# Patient Record
Sex: FEMALE | Race: WHITE | NOT HISPANIC OR LATINO | Employment: PART TIME | URBAN - METROPOLITAN AREA
[De-identification: names, ages, dates, MRNs, and addresses within clinical notes are randomized per-mention and may not be internally consistent; named-entity substitution may affect disease eponyms.]

---

## 2017-01-04 ENCOUNTER — TRANSCRIBE ORDERS (OUTPATIENT)
Dept: LAB | Facility: HOSPITAL | Age: 67
End: 2017-01-04

## 2017-01-04 DIAGNOSIS — I48.20 CHRONIC ATRIAL FIBRILLATION (HCC): Primary | ICD-10-CM

## 2017-04-15 ENCOUNTER — HOSPITAL ENCOUNTER (EMERGENCY)
Facility: HOSPITAL | Age: 67
Discharge: HOME | End: 2017-04-16
Attending: EMERGENCY MEDICINE | Admitting: EMERGENCY MEDICINE
Payer: COMMERCIAL

## 2017-04-15 VITALS
DIASTOLIC BLOOD PRESSURE: 80 MMHG | HEIGHT: 65 IN | TEMPERATURE: 96.5 F | BODY MASS INDEX: 23.32 KG/M2 | SYSTOLIC BLOOD PRESSURE: 130 MMHG | RESPIRATION RATE: 20 BRPM | HEART RATE: 67 BPM | WEIGHT: 140 LBS | OXYGEN SATURATION: 97 %

## 2017-04-15 DIAGNOSIS — T78.40XA ALLERGIC REACTION, INITIAL ENCOUNTER: Primary | ICD-10-CM

## 2017-04-15 DIAGNOSIS — B00.1 COLD SORE: ICD-10-CM

## 2017-04-15 PROCEDURE — 96375 TX/PRO/DX INJ NEW DRUG ADDON: CPT

## 2017-04-15 PROCEDURE — 96374 THER/PROPH/DIAG INJ IV PUSH: CPT

## 2017-04-15 PROCEDURE — 96361 HYDRATE IV INFUSION ADD-ON: CPT

## 2017-04-15 RX ORDER — METHYLPREDNISOLONE SODIUM SUCCINATE 125 MG/2ML
80 INJECTION, POWDER, LYOPHILIZED, FOR SOLUTION INTRAMUSCULAR; INTRAVENOUS ONCE
Status: COMPLETED | OUTPATIENT
Start: 2017-04-15 | End: 2017-04-15

## 2017-04-15 RX ORDER — DIPHENHYDRAMINE HYDROCHLORIDE 50 MG/ML
25 INJECTION INTRAMUSCULAR; INTRAVENOUS ONCE
Status: COMPLETED | OUTPATIENT
Start: 2017-04-15 | End: 2017-04-15

## 2017-04-15 RX ADMIN — SODIUM CHLORIDE 1000 ML: 0.9 INJECTION, SOLUTION INTRAVENOUS at 22:34

## 2017-04-15 RX ADMIN — DIPHENHYDRAMINE HYDROCHLORIDE 25 MG: 50 INJECTION, SOLUTION INTRAMUSCULAR; INTRAVENOUS at 22:54

## 2017-04-15 RX ADMIN — METHYLPREDNISOLONE SODIUM SUCCINATE 80 MG: 125 INJECTION, POWDER, FOR SOLUTION INTRAMUSCULAR; INTRAVENOUS at 22:42

## 2017-04-15 RX ADMIN — FAMOTIDINE 20 MG: 10 INJECTION, SOLUTION INTRAVENOUS at 22:47

## 2017-04-16 PROCEDURE — 99283 EMERGENCY DEPT VISIT LOW MDM: CPT

## 2017-04-16 RX ORDER — PREDNISONE 10 MG/1
20 TABLET ORAL DAILY
Qty: 15 TABLET | Refills: 0 | Status: SHIPPED | OUTPATIENT
Start: 2017-04-16 | End: 2017-04-21

## 2017-04-16 RX ORDER — VALACYCLOVIR HYDROCHLORIDE 1 G/1
1000 TABLET, FILM COATED ORAL 3 TIMES DAILY
Qty: 30 TABLET | Refills: 0 | Status: SHIPPED | OUTPATIENT
Start: 2017-04-16 | End: 2017-04-16

## 2017-04-16 RX ORDER — VALACYCLOVIR HYDROCHLORIDE 1 G/1
2000 TABLET, FILM COATED ORAL 2 TIMES DAILY
Qty: 4 TABLET | Refills: 0 | Status: SHIPPED | OUTPATIENT
Start: 2017-04-16 | End: 2017-07-13

## 2017-05-03 ENCOUNTER — ALLSCRIPTS OFFICE VISIT (OUTPATIENT)
Dept: OTHER | Facility: OTHER | Age: 67
End: 2017-05-03

## 2017-05-04 ENCOUNTER — GENERIC CONVERSION - ENCOUNTER (OUTPATIENT)
Dept: OTHER | Facility: OTHER | Age: 67
End: 2017-05-04

## 2017-05-10 ENCOUNTER — ALLSCRIPTS OFFICE VISIT (OUTPATIENT)
Dept: OTHER | Facility: OTHER | Age: 67
End: 2017-05-10

## 2017-07-13 ENCOUNTER — GENERIC CONVERSION - ENCOUNTER (OUTPATIENT)
Dept: OTHER | Facility: OTHER | Age: 67
End: 2017-07-13

## 2017-07-13 ENCOUNTER — HOSPITAL ENCOUNTER (EMERGENCY)
Facility: HOSPITAL | Age: 67
Discharge: HOME | End: 2017-07-13
Attending: EMERGENCY MEDICINE
Payer: COMMERCIAL

## 2017-07-13 ENCOUNTER — APPOINTMENT (EMERGENCY)
Dept: RADIOLOGY | Facility: HOSPITAL | Age: 67
End: 2017-07-13
Payer: COMMERCIAL

## 2017-07-13 ENCOUNTER — ALLSCRIPTS OFFICE VISIT (OUTPATIENT)
Dept: OTHER | Facility: OTHER | Age: 67
End: 2017-07-13

## 2017-07-13 VITALS
OXYGEN SATURATION: 99 % | HEART RATE: 70 BPM | RESPIRATION RATE: 18 BRPM | SYSTOLIC BLOOD PRESSURE: 148 MMHG | HEIGHT: 65 IN | BODY MASS INDEX: 22.49 KG/M2 | DIASTOLIC BLOOD PRESSURE: 74 MMHG | WEIGHT: 135 LBS | TEMPERATURE: 97.9 F

## 2017-07-13 DIAGNOSIS — Z86.79 HISTORY OF ATRIAL FIBRILLATION: Primary | ICD-10-CM

## 2017-07-13 DIAGNOSIS — Z86.79 HISTORY OF ATRIAL FLUTTER: ICD-10-CM

## 2017-07-13 DIAGNOSIS — R00.2 PALPITATIONS: ICD-10-CM

## 2017-07-13 LAB
ALBUMIN SERPL BCP-MCNC: 3.3 G/DL (ref 3.5–5)
ALP SERPL-CCNC: 74 U/L (ref 46–116)
ALT SERPL W P-5'-P-CCNC: 26 U/L (ref 12–78)
ANION GAP SERPL CALCULATED.3IONS-SCNC: 6 MMOL/L (ref 4–13)
APTT PPP: 26 SECONDS (ref 23–35)
AST SERPL W P-5'-P-CCNC: 16 U/L (ref 5–45)
ATRIAL RATE: 66 BPM
BASOPHILS # BLD AUTO: 0.03 THOUSANDS/ΜL (ref 0–0.1)
BASOPHILS NFR BLD AUTO: 0 % (ref 0–1)
BILIRUB SERPL-MCNC: 0.35 MG/DL (ref 0.2–1)
BUN SERPL-MCNC: 14 MG/DL (ref 5–25)
CALCIUM SERPL-MCNC: 8.7 MG/DL (ref 8.3–10.1)
CHLORIDE SERPL-SCNC: 108 MMOL/L (ref 100–108)
CO2 SERPL-SCNC: 23 MMOL/L (ref 21–32)
CREAT SERPL-MCNC: 0.73 MG/DL (ref 0.6–1.3)
EOSINOPHIL # BLD AUTO: 0.08 THOUSAND/ΜL (ref 0–0.61)
EOSINOPHIL NFR BLD AUTO: 1 % (ref 0–6)
ERYTHROCYTE [DISTWIDTH] IN BLOOD BY AUTOMATED COUNT: 14.9 % (ref 11.6–15.1)
GFR SERPL CREATININE-BSD FRML MDRD: >60 ML/MIN/1.73SQ M
GLUCOSE SERPL-MCNC: 92 MG/DL (ref 65–140)
HCT VFR BLD AUTO: 36.1 % (ref 34.8–46.1)
HGB BLD-MCNC: 12.3 G/DL (ref 11.5–15.4)
INR PPP: 0.97 (ref 0.86–1.16)
LYMPHOCYTES # BLD AUTO: 1.34 THOUSANDS/ΜL (ref 0.6–4.47)
LYMPHOCYTES NFR BLD AUTO: 17 % (ref 14–44)
MAGNESIUM SERPL-MCNC: 1.9 MG/DL (ref 1.6–2.6)
MCH RBC QN AUTO: 29.7 PG (ref 26.8–34.3)
MCHC RBC AUTO-ENTMCNC: 34.1 G/DL (ref 31.4–37.4)
MCV RBC AUTO: 87 FL (ref 82–98)
MONOCYTES # BLD AUTO: 1.2 THOUSAND/ΜL (ref 0.17–1.22)
MONOCYTES NFR BLD AUTO: 15 % (ref 4–12)
NEUTROPHILS # BLD AUTO: 5.41 THOUSANDS/ΜL (ref 1.85–7.62)
NEUTS SEG NFR BLD AUTO: 67 % (ref 43–75)
NRBC BLD AUTO-RTO: 0 /100 WBCS
P AXIS: 69 DEGREES
PHOSPHATE SERPL-MCNC: 2.5 MG/DL (ref 2.3–4.1)
PLATELET # BLD AUTO: 337 THOUSANDS/UL (ref 149–390)
PMV BLD AUTO: 9.3 FL (ref 8.9–12.7)
POTASSIUM SERPL-SCNC: 3.4 MMOL/L (ref 3.5–5.3)
PR INTERVAL: 282 MS
PROT SERPL-MCNC: 6.6 G/DL (ref 6.4–8.2)
PROTHROMBIN TIME: 12.9 SECONDS (ref 12.1–14.4)
QRS AXIS: 71 DEGREES
QRSD INTERVAL: 82 MS
QT INTERVAL: 408 MS
QTC INTERVAL: 427 MS
RBC # BLD AUTO: 4.14 MILLION/UL (ref 3.81–5.12)
SODIUM SERPL-SCNC: 137 MMOL/L (ref 136–145)
SPECIMEN SOURCE: NORMAL
T WAVE AXIS: 51 DEGREES
TROPONIN I BLD-MCNC: 0 NG/ML (ref 0–0.08)
TROPONIN I SERPL-MCNC: <0.02 NG/ML
VENTRICULAR RATE: 66 BPM
WBC # BLD AUTO: 8.07 THOUSAND/UL (ref 4.31–10.16)

## 2017-07-13 PROCEDURE — 99285 EMERGENCY DEPT VISIT HI MDM: CPT

## 2017-07-13 PROCEDURE — 84100 ASSAY OF PHOSPHORUS: CPT | Performed by: EMERGENCY MEDICINE

## 2017-07-13 PROCEDURE — 36415 COLL VENOUS BLD VENIPUNCTURE: CPT

## 2017-07-13 PROCEDURE — 93005 ELECTROCARDIOGRAM TRACING: CPT | Performed by: EMERGENCY MEDICINE

## 2017-07-13 PROCEDURE — 85730 THROMBOPLASTIN TIME PARTIAL: CPT | Performed by: EMERGENCY MEDICINE

## 2017-07-13 PROCEDURE — 85025 COMPLETE CBC W/AUTO DIFF WBC: CPT | Performed by: EMERGENCY MEDICINE

## 2017-07-13 PROCEDURE — 84484 ASSAY OF TROPONIN QUANT: CPT

## 2017-07-13 PROCEDURE — 85610 PROTHROMBIN TIME: CPT | Performed by: EMERGENCY MEDICINE

## 2017-07-13 PROCEDURE — 80053 COMPREHEN METABOLIC PANEL: CPT | Performed by: EMERGENCY MEDICINE

## 2017-07-13 PROCEDURE — 84484 ASSAY OF TROPONIN QUANT: CPT | Performed by: EMERGENCY MEDICINE

## 2017-07-13 PROCEDURE — 83735 ASSAY OF MAGNESIUM: CPT | Performed by: EMERGENCY MEDICINE

## 2017-07-13 PROCEDURE — 36415 COLL VENOUS BLD VENIPUNCTURE: CPT | Performed by: EMERGENCY MEDICINE

## 2017-07-13 PROCEDURE — 71020 HB CHEST X-RAY 2VW FRONTAL&LATL: CPT

## 2017-07-13 RX ORDER — POTASSIUM CHLORIDE 20 MEQ/1
40 TABLET, EXTENDED RELEASE ORAL ONCE
Status: COMPLETED | OUTPATIENT
Start: 2017-07-13 | End: 2017-07-13

## 2017-07-13 RX ORDER — FLUOXETINE HYDROCHLORIDE 40 MG/1
40 CAPSULE ORAL DAILY
COMMUNITY

## 2017-07-13 RX ORDER — POTASSIUM CHLORIDE 20 MEQ/1
20 TABLET, EXTENDED RELEASE ORAL ONCE
Status: DISCONTINUED | OUTPATIENT
Start: 2017-07-13 | End: 2017-07-13

## 2017-07-13 RX ORDER — POTASSIUM CHLORIDE 20 MEQ/1
20 TABLET, EXTENDED RELEASE ORAL DAILY
COMMUNITY
End: 2020-06-23 | Stop reason: SDUPTHER

## 2017-07-13 RX ORDER — PROPAFENONE HYDROCHLORIDE 150 MG/1
150 TABLET, FILM COATED ORAL DAILY PRN
COMMUNITY
End: 2018-07-12

## 2017-07-13 RX ADMIN — POTASSIUM CHLORIDE 40 MEQ: 1500 TABLET, EXTENDED RELEASE ORAL at 17:23

## 2017-08-21 ENCOUNTER — ALLSCRIPTS OFFICE VISIT (OUTPATIENT)
Dept: OTHER | Facility: OTHER | Age: 67
End: 2017-08-21

## 2017-08-21 DIAGNOSIS — I48.91 ATRIAL FIBRILLATION (HCC): ICD-10-CM

## 2017-08-21 DIAGNOSIS — D68.0 VON WILLEBRAND'S DISEASE (HCC): ICD-10-CM

## 2017-08-30 ENCOUNTER — ALLSCRIPTS OFFICE VISIT (OUTPATIENT)
Dept: OTHER | Facility: OTHER | Age: 67
End: 2017-08-30

## 2017-08-30 ENCOUNTER — APPOINTMENT (OUTPATIENT)
Dept: LAB | Facility: HOSPITAL | Age: 67
End: 2017-08-30
Attending: INTERNAL MEDICINE
Payer: COMMERCIAL

## 2017-08-30 ENCOUNTER — TRANSCRIBE ORDERS (OUTPATIENT)
Dept: LAB | Facility: HOSPITAL | Age: 67
End: 2017-08-30

## 2017-08-30 DIAGNOSIS — D68.0 VON WILLEBRAND'S DISEASE (HCC): ICD-10-CM

## 2017-08-30 LAB
APTT PPP: 27 SECONDS (ref 23–35)
BASOPHILS # BLD AUTO: 0.06 THOUSANDS/ΜL (ref 0–0.1)
BASOPHILS NFR BLD AUTO: 1 % (ref 0–1)
EOSINOPHIL # BLD AUTO: 0.21 THOUSAND/ΜL (ref 0–0.61)
EOSINOPHIL NFR BLD AUTO: 3 % (ref 0–6)
ERYTHROCYTE [DISTWIDTH] IN BLOOD BY AUTOMATED COUNT: 15.4 % (ref 11.6–15.1)
HCT VFR BLD AUTO: 33.9 % (ref 34.8–46.1)
HGB BLD-MCNC: 10.6 G/DL (ref 11.5–15.4)
INR PPP: 0.92 (ref 0.86–1.16)
LYMPHOCYTES # BLD AUTO: 1.45 THOUSANDS/ΜL (ref 0.6–4.47)
LYMPHOCYTES NFR BLD AUTO: 20 % (ref 14–44)
MCH RBC QN AUTO: 26 PG (ref 26.8–34.3)
MCHC RBC AUTO-ENTMCNC: 31.3 G/DL (ref 31.4–37.4)
MCV RBC AUTO: 83 FL (ref 82–98)
MONOCYTES # BLD AUTO: 0.8 THOUSAND/ΜL (ref 0.17–1.22)
MONOCYTES NFR BLD AUTO: 11 % (ref 4–12)
NEUTROPHILS # BLD AUTO: 4.74 THOUSANDS/ΜL (ref 1.85–7.62)
NEUTS SEG NFR BLD AUTO: 65 % (ref 43–75)
NRBC BLD AUTO-RTO: 0 /100 WBCS
PLATELET # BLD AUTO: 435 THOUSANDS/UL (ref 149–390)
PMV BLD AUTO: 9.4 FL (ref 8.9–12.7)
PROTHROMBIN TIME: 12.4 SECONDS (ref 12.1–14.4)
RBC # BLD AUTO: 4.07 MILLION/UL (ref 3.81–5.12)
WBC # BLD AUTO: 7.27 THOUSAND/UL (ref 4.31–10.16)

## 2017-08-30 PROCEDURE — 85025 COMPLETE CBC W/AUTO DIFF WBC: CPT

## 2017-08-30 PROCEDURE — 85730 THROMBOPLASTIN TIME PARTIAL: CPT

## 2017-08-30 PROCEDURE — 85245 CLOT FACTOR VIII VW RISTOCTN: CPT

## 2017-08-30 PROCEDURE — 85246 CLOT FACTOR VIII VW ANTIGEN: CPT

## 2017-08-30 PROCEDURE — 85247 CLOT FACTOR VIII MULTIMETRIC: CPT

## 2017-08-30 PROCEDURE — 85240 CLOT FACTOR VIII AHG 1 STAGE: CPT

## 2017-08-30 PROCEDURE — 85610 PROTHROMBIN TIME: CPT

## 2017-08-30 PROCEDURE — 36415 COLL VENOUS BLD VENIPUNCTURE: CPT

## 2017-09-01 LAB
FACT XIIIA PPP-ACNC: 184 % (ref 57–163)
VWF AG ACT/NOR PPP IA: 203 % (ref 50–200)
VWF:RCO ACT/NOR PPP PL AGG: 48 % (ref 50–200)

## 2017-09-07 LAB — VWF MULTIMERS PPP IB: NORMAL

## 2017-09-14 ENCOUNTER — HOSPITAL ENCOUNTER (OUTPATIENT)
Dept: INFUSION CENTER | Facility: CLINIC | Age: 67
Discharge: HOME | End: 2017-09-14
Payer: COMMERCIAL

## 2017-09-14 VITALS
RESPIRATION RATE: 18 BRPM | BODY MASS INDEX: 23.11 KG/M2 | HEART RATE: 64 BPM | SYSTOLIC BLOOD PRESSURE: 142 MMHG | DIASTOLIC BLOOD PRESSURE: 84 MMHG | WEIGHT: 138.89 LBS | TEMPERATURE: 97.2 F

## 2017-09-14 PROCEDURE — 85246 CLOT FACTOR VIII VW ANTIGEN: CPT | Performed by: INTERNAL MEDICINE

## 2017-09-14 PROCEDURE — 96365 THER/PROPH/DIAG IV INF INIT: CPT

## 2017-09-14 PROCEDURE — 85245 CLOT FACTOR VIII VW RISTOCTN: CPT | Performed by: INTERNAL MEDICINE

## 2017-09-14 PROCEDURE — 85240 CLOT FACTOR VIII AHG 1 STAGE: CPT | Performed by: INTERNAL MEDICINE

## 2017-09-14 RX ORDER — SODIUM CHLORIDE 9 MG/ML
20 INJECTION, SOLUTION INTRAVENOUS CONTINUOUS
Status: DISCONTINUED | OUTPATIENT
Start: 2017-09-14 | End: 2017-09-17 | Stop reason: HOSPADM

## 2017-09-14 RX ORDER — FLECAINIDE ACETATE 100 MG/1
100 TABLET ORAL 2 TIMES DAILY
COMMUNITY
End: 2018-03-01 | Stop reason: SDUPTHER

## 2017-09-14 RX ADMIN — DESMOPRESSIN ACETATE 20 MCG: 4 SOLUTION INTRAVENOUS at 09:50

## 2017-09-14 RX ADMIN — SODIUM CHLORIDE 20 ML/HR: 0.9 INJECTION, SOLUTION INTRAVENOUS at 09:40

## 2017-09-14 NOTE — PROGRESS NOTES
Pt arrived to infusion center for DDAVP, no complaints noted  Von Willebrand Risocetin cofactor and Von Willebrand activity drawn prior to start of DDAVP  Pt tolerated treatment without side effects  Repeat labs drawn post one hour after DDAVP as ordered  Pt discharged home from Infusion center with

## 2017-09-15 LAB
FACT XIIIA PPP-ACNC: 215 % (ref 57–163)
FACT XIIIA PPP-ACNC: 291 % (ref 57–163)
VWF:RCO ACT/NOR PPP PL AGG: 44 % (ref 50–200)

## 2017-09-16 LAB — VWF:RCO ACT/NOR PPP PL AGG: 46 % (ref 50–200)

## 2017-09-20 LAB — FACT VIII AG ACT/NOR PPP IA: 202 %

## 2017-09-23 LAB — FACT VIII AG ACT/NOR PPP IA: 418 %

## 2017-09-28 ENCOUNTER — GENERIC CONVERSION - ENCOUNTER (OUTPATIENT)
Dept: OTHER | Facility: OTHER | Age: 67
End: 2017-09-28

## 2017-10-25 NOTE — PROGRESS NOTES
Assessment  1  Von Willebrand disease (286 4) (D68 0)   2  Atrial fibrillation, new onset (427 31) (I48 91)    Plan  Von Willebrand disease    · (1) APTT; Status:Active; Requested for:72Zzb4986;    Perform:[facility]; Due:91Ugs6735; Ordered; For:Von Willebrand disease; Ordered By:[employer]);   · (1) CBC/PLT/DIFF; Status:Active; Requested for:52Cjj5973;    Perform:[facility]; Due:31Hiv2524; Ordered; For:Von Willebrand disease; Ordered By:[employer]);   · (1) PT WITH INR; Status:Active; Requested for:74Mgy6609;    Perform:[facility]; Due:94Hjr2150; Ordered; For:Von Willebrand disease; Ordered By:[employer]);   · (1) RISTO/VWF FACTOR; Status:Active; Requested for:57Fai7148;    Perform:[facility]; Due:85Ihn4913; Ordered; For:Von Willebrand disease; Ordered By:[employer]);   · (1) VONWILLEBRAND FACTOR MULTIMER PROFILE; Status:Active; Requested  for:89Fii3064;    Perform:[facility]; Due:20Dny6750; Ordered; For:Von Willebrand disease; Ordered By:[employer]); · Follow-up visit in 1 month Evaluation and Treatment  Follow-up  Status: Complete  Done:  67CPD5457 10:15AM   Ordered; For: Ettie Mourning disease; Ordered By: Elsie Soares) Performed:  Due: 14ZZL9366; Last Updated By: Ronnie Kelly; 8/30/2017 10:08:37 AM    Discussion/Summary  Discussion Summary:   Type II a von Willebrand disease, inherited, previous blood work showed Ettie Mourning activity in the range of 27%, Normal factor VIII level, Previous von Willebrand multimeric showed a normal pattern and distribution of the bands which is not consistent with von Willebrand disease type IIAfactor of 69% also not consistent with V WD type II apatient has intermittent atrial fibrillation and the need for anticoagulationI will repeat von Willebrand profile in our lab including ristocetin cofactor, von Willebrand activity, von Willebrand multimer's the patient might have 1 brand type I diseaseI will arrange for DDAVP 20 unit IV over 15 minutes and repeat ristocetin cofactor activity as well as 1 brand activity 30-60 minutes after finishing the infusion to assess the response to DDAVPUsually the patient has VOW type II a the response to DDAVP is not uniformNo aspirin or anticoagulation until I see the patient back in 1 month     Chief Complaint  Chief Complaint: Chief Complaint:   The patient presents to the office today with Atrial fibrillation, For possible anticoagulation or aspirin she is known to have von Willebrand disease type IIA  History of Present Illness  HPI: 74year old  female who was admitted to the hospital in August 2012 for small bowel obstruction, she was diagnosed previously with von Willebrand disease type CCLXXX after she reported heavy menstrual cycles and easy bruisability, evaluated at [facility] and diagnosed with type to a and she was told to receive Humate-P prior to surgical procedures, she had a history of hysterectomy, put it back to me, hemorrhoidectomy without any complication of bleeding  to have colonoscopy with Humate-P without any evidence of [id number] the patient was found to have intermittent atrial fibrillation followed up by cardiology, the patient is here for discussion of possible anticoagulation or aspirin in the picture of von Willebrand disease   Free Text HPI:          Review of SystemsComplete-Female:   Constitutional: no fever,-- not feeling poorly-- and-- not feeling tired  Eyes: No complaints of eye pain, no red eyes, no eyesight problems, no discharge, no dry eyes, no itching of eyes  ENT: no complaints of earache, no loss of hearing, no nose bleeds, no nasal discharge, no sore throat, no hoarseness  Cardiovascular: No complaints of slow heart rate, no fast heart rate, no chest pain, no palpitations, no leg claudication, no lower extremity edema     Respiratory: No complaints of shortness of breath, no wheezing, no cough, no SOB on exertion, no orthopnea, no PND  Gastrointestinal: No complaints of abdominal pain, no constipation, no nausea or vomiting, no diarrhea, no bloody stools  Genitourinary: No complaints of dysuria, no incontinence, no pelvic pain, no dysmenorrhea, no vaginal discharge or bleeding  Musculoskeletal: No complaints of arthralgias, no myalgias, no joint swelling or stiffness, no limb pain or swelling  Integumentary: No complaints of skin rash or lesions, no itching, no skin wounds, no breast pain or lump  Neurological: No complaints of headache, no confusion, no convulsions, no numbness, no dizziness or fainting, no tingling, no limb weakness, no difficulty walking  Psychiatric: Not suicidal, no sleep disturbance, no anxiety or depression, no change in personality, no emotional problems  Endocrine: No complaints of proptosis, no hot flashes, no muscle weakness, no deepening of the voice, no feelings of weakness  Hematologic/Lymphatic: a tendency for easy bruising, but-- no swollen glands-- and-- no swollen glands in the neck  Active Problems  1  Anxiety (300 00) (F41 9)   2  Asthma (493 90) (J45 909)   3  Atrial fibrillation, new onset (427 31) (I48 91)   4  Atrial flutter (427 32) (I48 92)   5  Bronchitis (490) (J40)   6  History of strep sore throat (V12 09) (Z87 09)   7  Von Willebrand disease (286 4) (D68 0)    Surgical History  1  History of Hemorrhoidectomy   2  History of Hysterectomy   3  History of Tonsillectomy    Family History  Family History    1  Family history of Arthritis (716 90) (M19 90)   2  Family history of Diabetes (250 00) (E11 9)   3  Family history of Heart attack (410 90) (I21 3)   4  Family history of Heart disease (429 9) (I51 9)   5  Family history of HTN (hypertension) (401 9) (I10)   6  Family history of Hyperlipidemia (272 4) (E78 5)   7   Family history of Thyroid cancer (193) (C73)    Social History   · Alcohol use (V49 89) (Z78 9)   · Former smoker (S19 45) (U17 011)   · Full-time employment   ·    · Never A Smoker   · No drug use    Current Meds   1  Ativan 0 5 MG Oral Tablet; TAKE 1 TABLET Bedtime; Therapy: (Recorded:03May2017) to Recorded   2  DilTIAZem HCl ER Coated Beads 120 MG Oral Capsule Extended Release 24 Hour;   take 1 capsule daily; Therapy: 90EZO0673 to (Evaluate:28Apr2018)  Requested for: 36LIV3382; Last   Rx:03May2017 Ordered   3  Estradiol 1 MG Oral Tablet; Take 1 tablet daily Recorded   4  Flecainide Acetate 100 MG Oral Tablet; Take 1/2 tablet twice a day for 4 days, then   increase to 1 tablet twice daily; Therapy: 21Aug2017 to (Evaluate:17Feb2018)  Requested for: 21Aug2017; Last   Rx:21Aug2017 Ordered   5  Potassium Chloride ER 20 MEQ Oral Tablet Extended Release; Take 1 tablet daily; Therapy: (Recorded:10May2017) to Recorded   6  Probiotic CAPS; Therapy: (Recorded:10May2017) to Recorded   7  Proventil  (90 Base) MCG/ACT Inhalation Aerosol Solution; Therapy: (Recorded:09May2013) to Recorded   8  PROzac 20 MG Oral Capsule; take 1 capsule daily; Therapy: (Recorded:03May2017) to Recorded   9  Qvar AERS; Therapy: (Recorded:03May2017) to Recorded   10  Vitamin D3 TABS; Therapy: (Recorded:21Oct2016) to Recorded    Allergies  1  Bactrim TABS   2  Wellbutrin TABS    Vitals  Vital Signs    Recorded: 30Aug2017 09:58AM   Temperature 97 7 F   Heart Rate 70   Respiration 16   Systolic 058   Diastolic 80   Height 5 ft 5 in   Weight 137 lb 4 0 oz   BMI Calculated 22 84   BSA Calculated 1 69   O2 Saturation 99     Physical Exam    Constitutional   General appearance: No acute distress, well appearing and well nourished  Eyes   Conjunctiva and lids: No swelling, erythema or discharge  Pupils and irises: Equal, round and reactive to light  Ears, Nose, Mouth, and Throat   External inspection of ears and nose: Normal     Oropharynx: Normal with no erythema, edema, exudate or lesions  Pulmonary   Auscultation of lungs: Clear to auscultation  Cardiovascular   Auscultation of heart: Normal rate and rhythm, normal S1 and S2, without murmurs  Examination of extremities for edema and/or varicosities: Normal     Carotid pulses: Normal     Abdomen   Abdomen: Non-tender, no masses  Liver and spleen: No hepatomegaly or splenomegaly  Lymphatic   Palpation of lymph nodes in neck: No lymphadenopathy  Musculoskeletal   Gait and station: Normal     Digits and nails: Normal without clubbing or cyanosis  Inspection/palpation of joints, bones, and muscles: Normal     Skin   Skin and subcutaneous tissue: Normal without rashes or lesions  Neurologic   Cranial nerves: Cranial nerves 2-12 intact  Reflexes: 2+ and symmetric  Sensation: No sensory loss  Psychiatric   Orientation to person, place, and time: Normal     Mood and affect: Normal         ECOG 0       Future Appointments    Date/Time Provider Specialty Site   09/28/2017 10:15 AM FRANCESCA Lovett  Hematology [facility]   11/02/2017 02:20 PM Ruby Boas, MD Cardiology [facility]     Signatures   Electronically signed by :  FRANCESCA Marinelli ; Aug 30 2017 11:50AM EST                       (Author)

## 2017-11-02 ENCOUNTER — ALLSCRIPTS OFFICE VISIT (OUTPATIENT)
Dept: OTHER | Facility: OTHER | Age: 67
End: 2017-11-02

## 2017-11-02 DIAGNOSIS — I48.91 ATRIAL FIBRILLATION (HCC): ICD-10-CM

## 2017-11-03 NOTE — PROGRESS NOTES
Assessment  Assessed    1  Atrial fibrillation, new onset (427 31) (I48 91)   2  Atrial flutter (427 32) (I48 92)    Plan  Atrial fibrillation, new onset    · (1) POTASSIUM; Status:Active; Requested TOE:04BVT1220;    Perform:[facility]; [phone]; Ordered; For:Atrial fibrillation, new onset; Ordered By:Aura Montoya;   · STRESS TEST ONLY, EXERCISE; Status:Hold For - Scheduling; Requested  BDY:84VKZ2216;    Perform:[facility]; PCE:02MBJ0578; Ordered; For:Atrial fibrillation, new onset; Ordered By:Aura Montoya;   · Follow-up visit in 6 months Evaluation and Treatment  Follow-up  Status: Hold For -  Scheduling  Requested for: 90RHT1249   Ordered; For: Atrial fibrillation, new onset; Ordered By: Trever Gonsalez Performed:  Due: 68IZD8133    Discussion/Summary  Cardiology Discussion Summary Free Text Note Form [facility]:   1  Paroxysmal atrial fibrillation/aflutter  30 day TTM 11/16 showed no recurrence of afib, but she had more issues with afib/flutter over summer 2017  She may be a candidate for Watchman device (ALBA closure device)in the future if Nicky Cox is a contraindication to long term anticoagulation  She did see Dr Morey Shone who did not recommend anticoagulation and aspirin only twice a week due to her bleeding from hemorrhoids and anemia with Hgb 10 6  CHADS VASC is 2 given age and female gender, CHADS score is 0  She did not tolerate pill in the pocket Propafenone due to side effects, started Flecainide daily in 8/17 to help prevent recurrences, which she is tolerating and which has been helping to reduce afib/flutter events  She is also on Diltiazem 120 mg daily  Will get stress test to ensure no significant inducible ischemia and recheck potassium  Chief Complaint  Chief Complaint Free Text Note Form: 3 mth f/u      History of Present Illness  Cardiology HPI Free Text Note Form [facility]: 74year old woman with a history of [address] disease who is here for follow up of paroxysmal atrial fibrillation  was admitted to [place] [address] with bronchitis in 10/16  At that time she was noted to have paroxysmal afib, which she was not symptomatically aware of  She was started on Diltiazem for rate control  Beta blocker was avoided due to her bronchospasm with bronchitis/asthma  She was discharged on ASA 80 after discussions with Hematology due to her increased bleeding risk with von Willebrand's, and the plan was to consider event monitor after she recovered from bronchitis to see if she continued to have episodes of afib, as Hematology felt she should be taken off of aspirin if able in the future  10/16 showed normal LV size and function, EF 60%, no RWMA, grade II diastolic dysfunction, normal RV size and function  Atrial septum bows from left to right consistent with increased left atrial pressure  Trace MR  Trace TR    day TTM in 11-12/16 showed no afib, only sinus rhythm and sinus tachycardia with HR   was having bruising with the aspirin, it was stopped in 12/16 January 2017, she was diagnosed with colitis in the sigmoid colon after she had colonoscopy with Dr Alexsandra Mullericks  was diagnosed with strep throat and scarlet fever 5/4/17, at that time she was noted to be in afib with RVR,  bpm  She reports she had sudden onset palpitations, she felt her pulse, and it was irregular (no chest pain or shortness of breath, she felt a little foggy), she went to Urgent Care, was given Metoprolol 2 5 mg IV, and HR improved to 77 bpm  Her potassium was low  She saw Dr Sheryle Poche the following day, and was started on KCL 20 meq bid  saw her urgently 7/13/17, when she woke up at 2:30 AM with indigestion and felt that her heartbeat was fast and irregular  She took her Cardizem at 2:30, and at 3:30 she took the Propafenone (4 pills), as well as a potassium and aspirin  She was still feeling she was in afib, but HR decreased to 60s   She went to Urgent Care, and had an EKG which showed aflutter, she was going to get IV metoprolol but wasn't given because her BP was too low at 108/72  EKG in office showed rate controlled aflutter, I sent her to ED, but by the time she was seen and had EKG she had converted back to SR  She believes the whole episode was about 12 hours long  7/17 - 8/17, she had three episodes of palpitations, she took Propafenone for two of the episodes, these two episodes lasted about an hour  She doesn't like taking the Propafenone, as it makes her have sided effects - causes dizziness, fatigue, and taste changes  switched her from Propafenone PRN to daily Flecainide in 8/17  She reports she only had one episode of palpitations between 8/17 - 11/17, feels that Flecainide has been helping  She did see Dr Dre Singh, who did not recommend oral anticoagulation, and aspirin only twice a week, on daily aspirin she was noting bleeding with hemorrhoids  Review of Systems  Cardiology Female ROS:     Cardiac: rhythm problems-- and-- palpitations present , but-- no chest pain,-- no fainting/blackouts,-- no heart murmur present,-- no signs of swelling,-- no syncope/fainting,-- no AM fatigue-- and-- no witnessed apnea episodes  Skin: No complaints of nonhealing sores or skin rash  Genitourinary: post menopausal, but-- no recurrent urinary tract infections,-- no frequent urination at night,-- no difficult urination,-- no blood in urine,-- no kidney stones,-- no loss of bladder control,-- no kidney problems,-- no birth control or hormone replacement therapy-- and-- not pregnant   Psychological: depression-- and-- anxiety, but-- no panic attacks,-- no difficulty concentrating-- and-- no palpitations present  General: trouble sleeping-- and-- lack of energy/fatigue, but-- no appetite changes,-- no changes in weight,-- no fever,-- no night sweats-- and-- no frequent infections  Respiratory: no shortness of breath,-- no cough/sputum,-- no wheezing,-- no phlegm-- and-- no hemoptysis     HEENT: no serious eye problems,-- no hearing problems,-- no nose problems,-- no throat problems-- and-- no snoring   Gastrointestinal: diarrhea-- and-- abdonimal pain, but-- no liver problems,-- no nausea,-- no vomiting,-- no heartburn,-- no bloody stools,-- no constipation-- and-- no rectal bleeding   Hematologic: excessive bruising, but-- no bleeding disorders,-- no anemia-- and-- no blood clots-- hemorrhoids bleed a little  Neurological: headaches, but-- no numbness,-- no tingling,-- no weakness,-- no seizures,-- no dizziness,-- no diplopia-- and-- no daytime sleepiness   Musculoskeletal: No complaints of arthritis, back pain, or painfull swelling  ROS Reviewed:   ROS reviewed  Active Problems  Problems    1  Anxiety (300 00) (F41 9)   2  Asthma (493 90) (J45 909)   3  Atrial fibrillation, new onset (427 31) (I48 91)   4  Atrial flutter (427 32) (I48 92)   5  Bronchitis (490) (J40)   6  History of strep sore throat (V12 09) (Z87 09)   7  Von Willebrand disease (286 4) (D68 0)    Past Medical History  Active Problems And Past Medical History Reviewed: The active problems and past medical history were reviewed and updated today  Surgical History  Problems    1  History of Hemorrhoidectomy   2  History of Hysterectomy   3  History of Tonsillectomy  Surgical History Reviewed: The surgical history was reviewed and updated today  Family History  Family History    1  Family history of Arthritis (716 90) (M19 90)   2  Family history of Diabetes (250 00) (E11 9)   3  Family history of Heart attack (410 90) (I21 9)   4  Family history of Heart disease (429 9) (I51 9)   5  Family history of HTN (hypertension) (401 9) (I10)   6  Family history of Hyperlipidemia (272 4) (E78 5)   7  Family history of Thyroid cancer (80) (C73)  Family History Reviewed: The family history was reviewed and updated today         Social History  Problems    · Alcohol use (V49 89) (Z78 9)   · Former smoker (J48 17) (L58 023)   · Full-time employment   ·    · Never A Smoker   · No drug use  Social History Reviewed: The social history was reviewed and updated today  Current Meds   1  Aspirin 81 MG TABS; Take 1 tablet 2 x wkly; Therapy: (Recorded:02Nov2017) to Recorded   2  Ativan 0 5 MG Oral Tablet; TAKE 1 TABLET Bedtime; Therapy: (Recorded:03May2017) to Recorded   3  DilTIAZem HCl ER Coated Beads 120 MG Oral Capsule Extended Release 24 Hour;   take 1 capsule daily; Therapy: 18TFC2867 to (Evaluate:28Apr2018)  Requested for: 75ZCS3880; Last   Rx:03May2017 Ordered   4  Estradiol 1 MG Oral Tablet; Take 1 tablet daily Recorded   5  Flecainide Acetate 100 MG Oral Tablet; Take 1 tablet twice daily; Therapy: 42Nhp3983 to  Requested for: 52HBD1920 Recorded   6  Potassium Chloride ER 20 MEQ Oral Tablet Extended Release; Take 1 tablet daily; Therapy: (Recorded:10May2017) to Recorded   7  Probiotic CAPS; Therapy: (Recorded:10May2017) to Recorded   8  Proventil  (90 Base) MCG/ACT Inhalation Aerosol Solution; Therapy: (Recorded:09May2013) to Recorded   9  PROzac 40 MG Oral Capsule; take 1 capsule daily; Therapy: (450 45 295) to Recorded   10  Qvar AERS; Therapy: (Recorded:03May2017) to Recorded   11  Vitamin D3 TABS; Therapy: (Recorded:21Oct2016) to Recorded  Medication List Reviewed: The medication list was reviewed and updated today  Allergies  Medication    1  Bactrim TABS   2  Wellbutrin TABS    Vitals  Vital Signs    Recorded: 26RWL7538 02:38PM   Heart Rate 68   Systolic 389, LUE, Sitting   Diastolic 70, LUE, Sitting   Height 5 ft 5 in   Weight 139 lb 8 oz   BMI Calculated 23 21   BSA Calculated 1 7     Physical Exam    Constitutional   General appearance: No acute distress, well appearing and well nourished  Eyes   Conjunctiva and Sclera examination: Conjunctiva pink, sclera anicteric      Ears, Nose, Mouth, and Throat - Oropharynx: Clear, nares are clear, mucous membranes are moist    Neck   Neck and thyroid: Normal, supple, trachea midline, no thyromegaly  Pulmonary   Respiratory effort: No increased work of breathing or signs of respiratory distress  Auscultation of lungs: Clear to auscultation, no rales, no rhonchi, no wheezing, good air movement  Cardiovascular   Palpation of heart: Normal PMI, no thrills  Auscultation of heart: Normal rate and rhythm, normal S1 and S2, no murmurs  Carotid pulses: Normal, 2+ bilaterally  Examination of extremities for edema and/or varicosities: Normal     Chest - Chest: Normal    Abdomen   Abdomen: Non-tender and no distention  Musculoskeletal Gait and station: Normal gait  Skin - Skin and subcutaneous tissue: Normal without rashes or lesions  Skin is warm and well perfused, normal turgor  Neurologic - Speech: Normal     Psychiatric - Orientation to person, place, and time: Normal -- Mood and affect: Normal       Signatures   Electronically signed by :  Stephanie Harris MD; Nov 2 2017  2:55PM EST                       (Author)

## 2017-11-08 ENCOUNTER — HOSPITAL ENCOUNTER (OUTPATIENT)
Dept: NON INVASIVE DIAGNOSTICS | Facility: CLINIC | Age: 67
Discharge: HOME | End: 2017-11-08
Payer: COMMERCIAL

## 2017-11-08 ENCOUNTER — LAB (OUTPATIENT)
Dept: LAB | Facility: CLINIC | Age: 67
End: 2017-11-08
Payer: COMMERCIAL

## 2017-11-08 ENCOUNTER — TRANSCRIBE ORDERS (OUTPATIENT)
Dept: LAB | Facility: CLINIC | Age: 67
End: 2017-11-08

## 2017-11-08 DIAGNOSIS — I48.91 ATRIAL FIBRILLATION (HCC): ICD-10-CM

## 2017-11-08 DIAGNOSIS — I48.91 ATRIAL FIBRILLATION, UNSPECIFIED TYPE (HCC): Primary | ICD-10-CM

## 2017-11-08 DIAGNOSIS — I48.91 ATRIAL FIBRILLATION, UNSPECIFIED TYPE (HCC): ICD-10-CM

## 2017-11-08 LAB — POTASSIUM SERPL-SCNC: 3.6 MMOL/L (ref 3.5–5.3)

## 2017-11-08 PROCEDURE — 36415 COLL VENOUS BLD VENIPUNCTURE: CPT

## 2017-11-08 PROCEDURE — 84132 ASSAY OF SERUM POTASSIUM: CPT

## 2017-11-08 PROCEDURE — 93017 CV STRESS TEST TRACING ONLY: CPT

## 2017-11-09 LAB
CHEST PAIN STATEMENT: NORMAL
MAX DIASTOLIC BP: 80 MMHG
MAX HEART RATE: 126 BPM
MAX PREDICTED HEART RATE: 155 BPM
MAX. SYSTOLIC BP: 178 MMHG
PROTOCOL NAME: NORMAL
TARGET HR FORMULA: NORMAL
TEST INDICATION: NORMAL
TIME IN EXERCISE PHASE: 369 S

## 2018-01-11 NOTE — PROGRESS NOTES
Assessment  Assessed    1  Atrial fibrillation, new onset (427 31) (I48 91)   2  Atrial flutter (427 32) (I48 92)    Plan  Atrial fibrillation, new onset    · Propafenone HCl - 150 MG Oral Tablet   Rx By: Galo Kramer; Dispense: 30 Days ; #:12 Tablet; Refill: 5; For: Atrial fibrillation, new onset; KERRI = N; [facility] [facility] [place] [place] [id number]   · (1) POTASSIUM; Status:Active; Requested for:[id number];    Perform:[facility]; CCO:[id number]; Ordered; For:Atrial fibrillation, new onset; Ordered By:Aura Montoya;   · STRESS TEST ONLY, EXERCISE; Status:Hold For - Scheduling; Requested  for:57Vmz5878;    Perform:[facility]; WMJ:[id number]; Ordered; For:Atrial fibrillation, new onset; Ordered By:Aura Montoya;   · Follow-up visit in 3 months Evaluation and Treatment  Follow-up  Status: Hold For -  Scheduling  Requested for: 02Iwd1326   Ordered; For: Atrial fibrillation, new onset; Ordered By: Galo Kramer Performed:  Due: 25FIU9062  Atrial fibrillation, new onset, Atrial flutter    · Flecainide Acetate 100 MG Oral Tablet; Take 1/2 tablet twice a day for 4 days, then  increase to 1 tablet twice daily   Rx By: Galo Kramer; Dispense: 30 Days ; #:60 Tablet; Refill: 5; For: Atrial fibrillation, new onset, Atrial flutter; KERRI = N; Sent To: [facility] [id number]   · EKG/ECG- POC; Status:Complete;   Done: 72Prz9511   Perform: In Office; Due:78Hbo5749; Last Updated By:Julius Salinas; 8/21/2017 11:55:50 AM;Ordered; For:Atrial fibrillation, new onset, Atrial flutter; Ordered By:Aura Montoya;  Unlinked    · Aspirin 81 MG TABS   Dispense: 0 Days ; #: Sufficient TABS; Refill: 0; KERRI = N; Record; Last Updated By: Galo Kramer; 7/13/2017 2:24:21 PM    Discussion/Summary  Cardiology Discussion Summary Free Text Note Form [facility]:   1  Paroxysmal atrial fibrillation/aflutter  30 day TTM 11/16 showed no recurrence of afib  She had stopped aspirin in 12/16 due to von Willebrand's and easy bruising   She may be a candidate for Watchman device (ALBA closure device)in the future if Sharee Mosquera is a contraindication to long term anticoagulation  She will be seeing Dr Martin Lackey next week for a formal opinion regarding this  CHADS VASC is 2 given age and female gender, CHADS score is 0  She is not tolerating pill in the pocket Propafenone due to side effects, will try Flecainide daily as antiarrhythmic to see if she tolerates this better and if it reduces her afib recurrences  Will get stress test to ensure no significant inducible ischemia  Chief Complaint  Chief Complaint Free Text Note Form: Juan A Lewis is here today for a followup  She states that she had palpitations this morning, so she took a cardizem  She also states that she seems to get palpitations either at night or in the morning  JW      History of Present Illness  Cardiology HPI Free Text Note Form  Michael: 74year old woman with a history of [place] List disease who is here for follow up of paroxysmal atrial fibrillation  She was admitted to [place] [address] with bronchitis in 10/16  At that time she was noted to have paroxysmal afib, which she was not symptomatically aware of  She was started on Diltiazem for rate control  Beta blocker was avoided due to her bronchospasm with bronchitis/asthma  She was discharged on ASA 80 after discussions with Hematology due to her increased bleeding risk with von Willebrand's, and the plan was to consider event monitor after she recovered from bronchitis to see if she continued to have episodes of afib, as Hematology felt she should be taken off of aspirin if able in the future  Echo 10/16 showed normal LV size and function, EF 60%, no RWMA, grade II diastolic dysfunction, normal RV size and function  Atrial septum bows from left to right consistent with increased left atrial pressure  Trace MR  Trace TR      30 day TTM in 11-12/16 showed no afib, only sinus rhythm and sinus tachycardia with HR        She was having bruising with the aspirin, it was stopped in 12/16  In January 2017, she was diagnosed with colitis in the sigmoid colon after she had colonoscopy with Dr Annabelle Lopez  She was diagnosed with strep throat and scarlet fever 5/4/17, at that time she was noted to be in afib with RVR,  bpm  She reports she had sudden onset palpitations, she felt her pulse, and it was irregular (no chest pain or shortness of breath, she felt a little foggy), she went to Urgent Care, was given Metoprolol 2 5 mg IV, and HR improved to 77 bpm  Her potassium was low  She saw Dr Micaela Tripathi the following day, and was started on KCL 20 meq bid  I saw her urgently 7/13/17, when she woke up at 2:30 AM with indigestion and felt that her heartbeat was fast and irregular  She took her Cardizem at 2:30, and at 3:30 she took the Propafenone (4 pills), as well as a potassium and aspirin  She was still feeling she was in afib, but HR decreased to 60s  She went to Urgent Care, and had an EKG which showed aflutter, she was going to get IV metoprolol but wasn't given because her BP was too low at 108/72  EKG in office showed rate controlled aflutter, I sent her to ED, but by the time she was seen and had EKG she had converted back to SR  She believes the whole episode was about 12 hours long  Since 7/13/17, she has had three episodes of palpitations, she took Propafenone for two of the episodes, these two episodes lasted about an hour  She doesn't like taking the Propafenone, as it makes her have sided effects - causes dizziness, fatigue, and taste changes  She had another episode of palpitations this morning, she took her Diltiazem and potassium without propafenone, and episode still lasted about an hour  Her potassium has been low, she believes it is due to diarrhea from colitis, she was started on Imodium which has helped somewhat        Review of Systems  Cardiology Female ROS:     Cardiac: rhythm problems and palpitations present , but no chest pain, no fainting/blackouts, no heart murmur present, no signs of swelling, no syncope/fainting, no AM fatigue and no witnessed apnea episodes  Skin: No complaints of nonhealing sores or skin rash  Genitourinary: post menopausal, but no recurrent urinary tract infections, no frequent urination at night, no difficult urination, no blood in urine, no kidney stones, no loss of bladder control, no kidney problems, no birth control or hormone replacement therapy and not pregnant   Psychological: depression and anxiety, but no panic attacks, no difficulty concentrating and no palpitations present  General: trouble sleeping and lack of energy/fatigue, but no appetite changes, no changes in weight, no fever, no night sweats and no frequent infections  Respiratory: no shortness of breath, no cough/sputum, no wheezing, no phlegm and no hemoptysis  HEENT: no serious eye problems, no hearing problems, no nose problems, no throat problems and no snoring   Gastrointestinal: diarrhea and abdonimal pain, but no liver problems, no nausea, no vomiting, no heartburn, no bloody stools, no constipation and no rectal bleeding   Hematologic: excessive bruising, but no bleeding disorders, no anemia and no blood clots hemorrhoids bleed a little  Neurological: headaches, but no numbness, no tingling, no weakness, no seizures, no dizziness, no diplopia and no daytime sleepiness   Musculoskeletal: No complaints of arthritis, back pain, or painfull swelling  ROS Reviewed:   ROS reviewed  Active Problems  Problems    1  Anxiety (300 00) (F41 9)   2  Asthma (493 90) (J45 909)   3  Atrial fibrillation, new onset (427 31) (I48 91)   4  Atrial flutter (427 32) (I48 92)   5  Bronchitis (490) (J40)   6  History of strep sore throat (V12 09) (Z87 09)   7  Von Willebrand disease (286 4) (D68 0)    Past Medical History  Active Problems And Past Medical History Reviewed:    The active problems and past medical history were reviewed and updated today  Surgical History  Problems    1  History of Hemorrhoidectomy   2  History of Hysterectomy   3  History of Tonsillectomy  Surgical History Reviewed: The surgical history was reviewed and updated today  Family History  Family History    1  Family history of Arthritis (716 90) (M19 90)   2  Family history of Diabetes (250 00) (E11 9)   3  Family history of Heart attack (410 90) (I21 3)   4  Family history of Heart disease (429 9) (I51 9)   5  Family history of HTN (hypertension) (401 9) (I10)   6  Family history of Hyperlipidemia (272 4) (E78 5)   7  Family history of Thyroid cancer (80) (C73)  Family History Reviewed: The family history was reviewed and updated today  Social History  Problems    · Alcohol use (V49 89) (Z78 9)   · Former smoker (N79 47) (K05 629)   · Full-time employment   ·    · Never A Smoker   · No drug use  Social History Reviewed: The social history was reviewed and updated today  Current Meds   1  Aspirin 81 MG TABS; Therapy: (Recorded:67Vxk6841) to Recorded   2  Ativan 0 5 MG Oral Tablet; TAKE 1 TABLET Bedtime; Therapy: (Recorded:03May2017) to Recorded   3  DilTIAZem HCl ER Coated Beads 120 MG Oral Capsule Extended Release 24 Hour;   take 1 capsule daily; Therapy: 93YXO5594 to (Evaluate:28Apr2018)  Requested for: 44YLT5504; Last   Rx:03May2017 Ordered   4  Estradiol 1 MG Oral Tablet; Take 1 tablet daily Recorded   5  Potassium Chloride ER 20 MEQ Oral Tablet Extended Release; Take 1 tablet daily; Therapy: (Recorded:10May2017) to Recorded   6  Probiotic CAPS; Therapy: (Recorded:10May2017) to Recorded   7  Propafenone HCl - 150 MG Oral Tablet; TAKE 4 TABLET Once PRN Atrial fibrillation; Therapy: 11LFU2224 to (Evaluate:06Nov2017)  Requested for: 51TEJ5847; Last   Rx:10May2017 Ordered   8  Proventil  (90 Base) MCG/ACT Inhalation Aerosol Solution; Therapy: (Recorded:09May2013) to Recorded   9   PROzac 20 MG Oral Capsule; take 1 capsule daily; Therapy: (Recorded:03May2017) to Recorded   10  Qvar AERS; Therapy: (Recorded:03May2017) to Recorded   11  Vitamin D3 TABS; Therapy: (Recorded:21Oct2016) to Recorded  Medication List Reviewed: The medication list was reviewed and updated today  Allergies  Medication    1  Bactrim TABS   2  Wellbutrin TABS    Vitals  Vital Signs    Recorded: 21Aug2017 11:53AM   Heart Rate 70, Apical   Systolic 883, LUE, Sitting   Diastolic 62, LUE, Sitting   Height 5 ft 5 in   Weight 135 lb    BMI Calculated 22 47   BSA Calculated 1 67   O2 Saturation 98, RA     Physical Exam    Constitutional   General appearance: No acute distress, well appearing and well nourished  Eyes   Conjunctiva and Sclera examination: Conjunctiva pink, sclera anicteric  Ears, Nose, Mouth, and Throat - Oropharynx: Clear, nares are clear, mucous membranes are moist    Neck   Neck and thyroid: Normal, supple, trachea midline, no thyromegaly  Pulmonary   Respiratory effort: No increased work of breathing or signs of respiratory distress  Auscultation of lungs: Clear to auscultation, no rales, no rhonchi, no wheezing, good air movement  Cardiovascular   Palpation of heart: Normal PMI, no thrills  Auscultation of heart: Normal rate and rhythm, normal S1 and S2, no murmurs  Carotid pulses: Normal, 2+ bilaterally  Examination of extremities for edema and/or varicosities: Normal     Chest - Chest: Normal    Abdomen   Abdomen: Non-tender and no distention  Musculoskeletal Gait and station: Normal gait  Skin - Skin and subcutaneous tissue: Normal without rashes or lesions  Skin is warm and well perfused, normal turgor  Neurologic - Speech: Normal     Psychiatric - Orientation to person, place, and time: Normal  Mood and affect: Normal       Results/Data  ECG Report:   Rhythm and rate:  normal sinus rhythm  MA Interval: first degree heart block     Axis: the QRS axis is normal       Future Appointments    Date/Time Provider Specialty Site   08/30/2017 09:15 AM FRANCESCA Martinez  Hematology Oncology [facility] MEDICAL ONCOLOGY     Signatures   Electronically signed by :  Jillian Lewis MD; Aug 21 2017 12:21PM EST                       (Author)

## 2018-01-12 VITALS
HEIGHT: 65 IN | HEART RATE: 76 BPM | DIASTOLIC BLOOD PRESSURE: 70 MMHG | BODY MASS INDEX: 22.39 KG/M2 | WEIGHT: 134.38 LBS | SYSTOLIC BLOOD PRESSURE: 138 MMHG

## 2018-01-12 VITALS
WEIGHT: 136.38 LBS | DIASTOLIC BLOOD PRESSURE: 48 MMHG | HEART RATE: 63 BPM | BODY MASS INDEX: 22.72 KG/M2 | SYSTOLIC BLOOD PRESSURE: 122 MMHG | HEIGHT: 65 IN

## 2018-01-13 VITALS
WEIGHT: 135 LBS | HEART RATE: 70 BPM | DIASTOLIC BLOOD PRESSURE: 62 MMHG | OXYGEN SATURATION: 98 % | BODY MASS INDEX: 22.49 KG/M2 | HEIGHT: 65 IN | SYSTOLIC BLOOD PRESSURE: 102 MMHG

## 2018-01-13 VITALS
DIASTOLIC BLOOD PRESSURE: 70 MMHG | SYSTOLIC BLOOD PRESSURE: 130 MMHG | BODY MASS INDEX: 22.56 KG/M2 | HEIGHT: 65 IN | HEART RATE: 78 BPM | WEIGHT: 135.38 LBS

## 2018-01-14 VITALS
OXYGEN SATURATION: 99 % | DIASTOLIC BLOOD PRESSURE: 80 MMHG | BODY MASS INDEX: 22.87 KG/M2 | HEART RATE: 70 BPM | SYSTOLIC BLOOD PRESSURE: 122 MMHG | WEIGHT: 137.25 LBS | RESPIRATION RATE: 16 BRPM | HEIGHT: 65 IN | TEMPERATURE: 97.7 F

## 2018-01-14 VITALS
DIASTOLIC BLOOD PRESSURE: 70 MMHG | WEIGHT: 139.5 LBS | HEART RATE: 68 BPM | BODY MASS INDEX: 23.24 KG/M2 | HEIGHT: 65 IN | SYSTOLIC BLOOD PRESSURE: 122 MMHG

## 2018-01-22 VITALS
RESPIRATION RATE: 16 BRPM | DIASTOLIC BLOOD PRESSURE: 80 MMHG | HEIGHT: 65 IN | OXYGEN SATURATION: 98 % | TEMPERATURE: 97.2 F | WEIGHT: 141 LBS | BODY MASS INDEX: 23.49 KG/M2 | HEART RATE: 72 BPM | SYSTOLIC BLOOD PRESSURE: 140 MMHG

## 2018-02-02 ENCOUNTER — TRANSCRIBE ORDERS (OUTPATIENT)
Dept: LAB | Facility: HOSPITAL | Age: 68
End: 2018-02-02

## 2018-02-02 ENCOUNTER — APPOINTMENT (OUTPATIENT)
Dept: LAB | Facility: HOSPITAL | Age: 68
End: 2018-02-02
Payer: COMMERCIAL

## 2018-02-02 DIAGNOSIS — D64.9 ANEMIA, UNSPECIFIED TYPE: Primary | ICD-10-CM

## 2018-02-02 DIAGNOSIS — D64.9 ANEMIA, UNSPECIFIED TYPE: ICD-10-CM

## 2018-02-02 LAB
ERYTHROCYTE [DISTWIDTH] IN BLOOD BY AUTOMATED COUNT: 22.9 % (ref 11.6–15.1)
FERRITIN SERPL-MCNC: 9 NG/ML (ref 8–388)
HCT VFR BLD AUTO: 34.7 % (ref 34.8–46.1)
HGB BLD-MCNC: 11 G/DL (ref 11.5–15.4)
IRON SERPL-MCNC: 26 UG/DL (ref 50–170)
MCH RBC QN AUTO: 25.8 PG (ref 26.8–34.3)
MCHC RBC AUTO-ENTMCNC: 31.7 G/DL (ref 31.4–37.4)
MCV RBC AUTO: 81 FL (ref 82–98)
PLATELET # BLD AUTO: 443 THOUSANDS/UL (ref 149–390)
PMV BLD AUTO: 9.4 FL (ref 8.9–12.7)
RBC # BLD AUTO: 4.27 MILLION/UL (ref 3.81–5.12)
VIT B12 SERPL-MCNC: 748 PG/ML (ref 100–900)
WBC # BLD AUTO: 7.69 THOUSAND/UL (ref 4.31–10.16)

## 2018-02-02 PROCEDURE — 85027 COMPLETE CBC AUTOMATED: CPT

## 2018-02-02 PROCEDURE — 82728 ASSAY OF FERRITIN: CPT

## 2018-02-02 PROCEDURE — 36415 COLL VENOUS BLD VENIPUNCTURE: CPT

## 2018-02-02 PROCEDURE — 83540 ASSAY OF IRON: CPT

## 2018-02-02 PROCEDURE — 82607 VITAMIN B-12: CPT

## 2018-02-02 PROCEDURE — 83918 ORGANIC ACIDS TOTAL QUANT: CPT

## 2018-02-07 LAB — METHYLMALONATE SERPL-SCNC: 107 NMOL/L (ref 0–378)

## 2018-03-01 ENCOUNTER — TELEPHONE (OUTPATIENT)
Dept: CARDIOLOGY CLINIC | Facility: MEDICAL CENTER | Age: 68
End: 2018-03-01

## 2018-03-01 DIAGNOSIS — I48.0 PAROXYSMAL ATRIAL FIBRILLATION (HCC): Primary | ICD-10-CM

## 2018-03-01 RX ORDER — FLECAINIDE ACETATE 100 MG/1
100 TABLET ORAL 2 TIMES DAILY
Qty: 30 TABLET | Refills: 3 | Status: SHIPPED | OUTPATIENT
Start: 2018-03-01 | End: 2018-05-08 | Stop reason: SDUPTHER

## 2018-03-12 ENCOUNTER — TRANSCRIBE ORDERS (OUTPATIENT)
Dept: ADMINISTRATIVE | Facility: HOSPITAL | Age: 68
End: 2018-03-12

## 2018-03-12 DIAGNOSIS — D50.9 IRON DEFICIENCY ANEMIA, UNSPECIFIED IRON DEFICIENCY ANEMIA TYPE: ICD-10-CM

## 2018-03-12 DIAGNOSIS — K81.9 CHOLECYSTITIS: Primary | ICD-10-CM

## 2018-03-12 DIAGNOSIS — K81.9 CHOLECYSTITIS WITHOUT CALCULUS: ICD-10-CM

## 2018-03-12 DIAGNOSIS — I48.20 CHRONIC ATRIAL FIBRILLATION (HCC): ICD-10-CM

## 2018-03-16 ENCOUNTER — HOSPITAL ENCOUNTER (OUTPATIENT)
Dept: RADIOLOGY | Facility: HOSPITAL | Age: 68
Discharge: HOME | End: 2018-03-16
Payer: COMMERCIAL

## 2018-03-16 ENCOUNTER — APPOINTMENT (OUTPATIENT)
Dept: LAB | Facility: HOSPITAL | Age: 68
End: 2018-03-16
Payer: COMMERCIAL

## 2018-03-16 DIAGNOSIS — I48.20 CHRONIC ATRIAL FIBRILLATION (HCC): ICD-10-CM

## 2018-03-16 DIAGNOSIS — K81.9 CHOLECYSTITIS: ICD-10-CM

## 2018-03-16 DIAGNOSIS — K81.9 CHOLECYSTITIS WITHOUT CALCULUS: ICD-10-CM

## 2018-03-16 DIAGNOSIS — D50.9 IRON DEFICIENCY ANEMIA, UNSPECIFIED IRON DEFICIENCY ANEMIA TYPE: ICD-10-CM

## 2018-03-16 LAB
ALBUMIN SERPL BCP-MCNC: 3.2 G/DL (ref 3.5–5)
ALP SERPL-CCNC: 103 U/L (ref 46–116)
ALT SERPL W P-5'-P-CCNC: 30 U/L (ref 12–78)
ANION GAP SERPL CALCULATED.3IONS-SCNC: 6 MMOL/L (ref 4–13)
AST SERPL W P-5'-P-CCNC: 18 U/L (ref 5–45)
BILIRUB SERPL-MCNC: 0.26 MG/DL (ref 0.2–1)
BUN SERPL-MCNC: 13 MG/DL (ref 5–25)
CALCIUM SERPL-MCNC: 8.8 MG/DL (ref 8.3–10.1)
CHLORIDE SERPL-SCNC: 106 MMOL/L (ref 100–108)
CO2 SERPL-SCNC: 26 MMOL/L (ref 21–32)
CREAT SERPL-MCNC: 0.68 MG/DL (ref 0.6–1.3)
ERYTHROCYTE [DISTWIDTH] IN BLOOD BY AUTOMATED COUNT: 20.2 % (ref 11.6–15.1)
FERRITIN SERPL-MCNC: 65 NG/ML (ref 8–388)
GFR SERPL CREATININE-BSD FRML MDRD: 92 ML/MIN/1.73SQ M
GLUCOSE P FAST SERPL-MCNC: 87 MG/DL (ref 65–99)
HCT VFR BLD AUTO: 39.1 % (ref 34.8–46.1)
HGB BLD-MCNC: 12.8 G/DL (ref 11.5–15.4)
IRON SERPL-MCNC: 34 UG/DL (ref 50–170)
LIPASE SERPL-CCNC: 98 U/L (ref 73–393)
MCH RBC QN AUTO: 28.4 PG (ref 26.8–34.3)
MCHC RBC AUTO-ENTMCNC: 32.7 G/DL (ref 31.4–37.4)
MCV RBC AUTO: 87 FL (ref 82–98)
PLATELET # BLD AUTO: 412 THOUSANDS/UL (ref 149–390)
PMV BLD AUTO: 9.6 FL (ref 8.9–12.7)
POTASSIUM SERPL-SCNC: 4.3 MMOL/L (ref 3.5–5.3)
PROT SERPL-MCNC: 7.1 G/DL (ref 6.4–8.2)
RBC # BLD AUTO: 4.51 MILLION/UL (ref 3.81–5.12)
SODIUM SERPL-SCNC: 138 MMOL/L (ref 136–145)
WBC # BLD AUTO: 9.32 THOUSAND/UL (ref 4.31–10.16)

## 2018-03-16 PROCEDURE — 80053 COMPREHEN METABOLIC PANEL: CPT

## 2018-03-16 PROCEDURE — 85027 COMPLETE CBC AUTOMATED: CPT

## 2018-03-16 PROCEDURE — 76705 ECHO EXAM OF ABDOMEN: CPT

## 2018-03-16 PROCEDURE — 82728 ASSAY OF FERRITIN: CPT

## 2018-03-16 PROCEDURE — 36415 COLL VENOUS BLD VENIPUNCTURE: CPT

## 2018-03-16 PROCEDURE — 83540 ASSAY OF IRON: CPT

## 2018-03-16 PROCEDURE — 83690 ASSAY OF LIPASE: CPT

## 2018-04-23 DIAGNOSIS — I48.0 PAROXYSMAL ATRIAL FIBRILLATION (HCC): Primary | ICD-10-CM

## 2018-04-23 RX ORDER — DILTIAZEM HYDROCHLORIDE 120 MG/1
CAPSULE, EXTENDED RELEASE ORAL
Qty: 90 CAPSULE | Refills: 3 | Status: SHIPPED | OUTPATIENT
Start: 2018-04-23 | End: 2019-02-21 | Stop reason: HOSPADM

## 2018-05-08 DIAGNOSIS — I48.0 PAROXYSMAL ATRIAL FIBRILLATION (HCC): ICD-10-CM

## 2018-05-08 RX ORDER — FLECAINIDE ACETATE 100 MG/1
TABLET ORAL
Qty: 60 TABLET | Refills: 5 | Status: SHIPPED | OUTPATIENT
Start: 2018-05-08 | End: 2019-01-10 | Stop reason: SDUPTHER

## 2018-05-21 ENCOUNTER — TRANSCRIBE ORDERS (OUTPATIENT)
Dept: ADMINISTRATIVE | Facility: HOSPITAL | Age: 68
End: 2018-05-21

## 2018-05-21 DIAGNOSIS — D50.0 IRON DEFICIENCY ANEMIA DUE TO CHRONIC BLOOD LOSS: Primary | ICD-10-CM

## 2018-05-30 ENCOUNTER — HOSPITAL ENCOUNTER (OUTPATIENT)
Dept: RADIOLOGY | Facility: HOSPITAL | Age: 68
Discharge: HOME | End: 2018-05-30
Payer: COMMERCIAL

## 2018-05-30 DIAGNOSIS — D50.0 IRON DEFICIENCY ANEMIA DUE TO CHRONIC BLOOD LOSS: ICD-10-CM

## 2018-05-30 PROCEDURE — 74240 X-RAY XM UPR GI TRC 1CNTRST: CPT

## 2018-07-12 ENCOUNTER — HOSPITAL ENCOUNTER (OUTPATIENT)
Facility: HOSPITAL | Age: 68
Setting detail: OBSERVATION
Discharge: HOME | End: 2018-07-13
Attending: EMERGENCY MEDICINE | Admitting: INTERNAL MEDICINE
Payer: COMMERCIAL

## 2018-07-12 ENCOUNTER — APPOINTMENT (EMERGENCY)
Dept: RADIOLOGY | Facility: HOSPITAL | Age: 68
End: 2018-07-12
Payer: COMMERCIAL

## 2018-07-12 ENCOUNTER — OFFICE VISIT (OUTPATIENT)
Dept: URGENT CARE | Age: 68
End: 2018-07-12
Payer: COMMERCIAL

## 2018-07-12 VITALS
RESPIRATION RATE: 16 BRPM | TEMPERATURE: 97 F | SYSTOLIC BLOOD PRESSURE: 136 MMHG | HEIGHT: 65 IN | BODY MASS INDEX: 21.99 KG/M2 | WEIGHT: 132 LBS | OXYGEN SATURATION: 98 % | DIASTOLIC BLOOD PRESSURE: 80 MMHG | HEART RATE: 76 BPM

## 2018-07-12 DIAGNOSIS — R41.0 CONFUSION: ICD-10-CM

## 2018-07-12 DIAGNOSIS — R21 RASH: ICD-10-CM

## 2018-07-12 DIAGNOSIS — I48.4 ATYPICAL ATRIAL FLUTTER (HCC): ICD-10-CM

## 2018-07-12 DIAGNOSIS — R07.9 ACUTE CHEST PAIN: Primary | ICD-10-CM

## 2018-07-12 DIAGNOSIS — R10.13 EPIGASTRIC PAIN: ICD-10-CM

## 2018-07-12 DIAGNOSIS — R07.9 CHEST PAIN, UNSPECIFIED TYPE: Primary | ICD-10-CM

## 2018-07-12 PROBLEM — K44.9 HIATAL HERNIA: Status: ACTIVE | Noted: 2018-07-12

## 2018-07-12 LAB
ANION GAP SERPL CALCULATED.3IONS-SCNC: 10 MMOL/L (ref 4–13)
APTT PPP: 30 SECONDS (ref 24–36)
ATRIAL RATE: 113 BPM
ATRIAL RATE: 65 BPM
BASOPHILS # BLD AUTO: 0.05 THOUSANDS/ΜL (ref 0–0.1)
BASOPHILS NFR BLD AUTO: 1 % (ref 0–1)
BUN SERPL-MCNC: 9 MG/DL (ref 5–25)
CALCIUM SERPL-MCNC: 9.6 MG/DL (ref 8.3–10.1)
CHLORIDE SERPL-SCNC: 102 MMOL/L (ref 100–108)
CO2 SERPL-SCNC: 24 MMOL/L (ref 21–32)
CREAT SERPL-MCNC: 0.76 MG/DL (ref 0.6–1.3)
EOSINOPHIL # BLD AUTO: 0.1 THOUSAND/ΜL (ref 0–0.61)
EOSINOPHIL NFR BLD AUTO: 1 % (ref 0–6)
ERYTHROCYTE [DISTWIDTH] IN BLOOD BY AUTOMATED COUNT: 13.6 % (ref 11.6–15.1)
GFR SERPL CREATININE-BSD FRML MDRD: 82 ML/MIN/1.73SQ M
GLUCOSE SERPL-MCNC: 93 MG/DL (ref 65–140)
HCT VFR BLD AUTO: 42.1 % (ref 34.8–46.1)
HGB BLD-MCNC: 14 G/DL (ref 11.5–15.4)
INR PPP: 0.92 (ref 0.86–1.17)
LYMPHOCYTES # BLD AUTO: 1 THOUSANDS/ΜL (ref 0.6–4.47)
LYMPHOCYTES NFR BLD AUTO: 10 % (ref 14–44)
MCH RBC QN AUTO: 32.3 PG (ref 26.8–34.3)
MCHC RBC AUTO-ENTMCNC: 33.3 G/DL (ref 31.4–37.4)
MCV RBC AUTO: 97 FL (ref 82–98)
MONOCYTES # BLD AUTO: 1 THOUSAND/ΜL (ref 0.17–1.22)
MONOCYTES NFR BLD AUTO: 10 % (ref 4–12)
NEUTROPHILS # BLD AUTO: 8.35 THOUSANDS/ΜL (ref 1.85–7.62)
NEUTS SEG NFR BLD AUTO: 80 % (ref 43–75)
P AXIS: 60 DEGREES
P AXIS: 79 DEGREES
PLATELET # BLD AUTO: 256 THOUSANDS/UL (ref 149–390)
PMV BLD AUTO: 10.2 FL (ref 8.9–12.7)
POTASSIUM SERPL-SCNC: 4.1 MMOL/L (ref 3.5–5.3)
PR INTERVAL: 228 MS
PROTHROMBIN TIME: 12.1 SECONDS (ref 11.8–14.2)
QRS AXIS: 64 DEGREES
QRS AXIS: 67 DEGREES
QRSD INTERVAL: 86 MS
QRSD INTERVAL: 94 MS
QT INTERVAL: 370 MS
QT INTERVAL: 450 MS
QTC INTERVAL: 440 MS
QTC INTERVAL: 468 MS
RBC # BLD AUTO: 4.34 MILLION/UL (ref 3.81–5.12)
SODIUM SERPL-SCNC: 136 MMOL/L (ref 136–145)
T WAVE AXIS: 46 DEGREES
T WAVE AXIS: 56 DEGREES
TROPONIN I SERPL-MCNC: <0.02 NG/ML
VENTRICULAR RATE: 65 BPM
VENTRICULAR RATE: 85 BPM
WBC # BLD AUTO: 10.5 THOUSAND/UL (ref 4.31–10.16)

## 2018-07-12 PROCEDURE — 85025 COMPLETE CBC W/AUTO DIFF WBC: CPT | Performed by: EMERGENCY MEDICINE

## 2018-07-12 PROCEDURE — 93005 ELECTROCARDIOGRAM TRACING: CPT | Performed by: PHYSICIAN ASSISTANT

## 2018-07-12 PROCEDURE — 85610 PROTHROMBIN TIME: CPT | Performed by: EMERGENCY MEDICINE

## 2018-07-12 PROCEDURE — 84484 ASSAY OF TROPONIN QUANT: CPT | Performed by: EMERGENCY MEDICINE

## 2018-07-12 PROCEDURE — 99285 EMERGENCY DEPT VISIT HI MDM: CPT

## 2018-07-12 PROCEDURE — 84484 ASSAY OF TROPONIN QUANT: CPT | Performed by: INTERNAL MEDICINE

## 2018-07-12 PROCEDURE — 85730 THROMBOPLASTIN TIME PARTIAL: CPT | Performed by: EMERGENCY MEDICINE

## 2018-07-12 PROCEDURE — 93005 ELECTROCARDIOGRAM TRACING: CPT

## 2018-07-12 PROCEDURE — 71046 X-RAY EXAM CHEST 2 VIEWS: CPT

## 2018-07-12 PROCEDURE — 36415 COLL VENOUS BLD VENIPUNCTURE: CPT | Performed by: EMERGENCY MEDICINE

## 2018-07-12 PROCEDURE — G0463 HOSPITAL OUTPT CLINIC VISIT: HCPCS | Performed by: FAMILY MEDICINE

## 2018-07-12 PROCEDURE — 99214 OFFICE O/P EST MOD 30 MIN: CPT | Performed by: FAMILY MEDICINE

## 2018-07-12 PROCEDURE — 93010 ELECTROCARDIOGRAM REPORT: CPT | Performed by: INTERNAL MEDICINE

## 2018-07-12 PROCEDURE — 80048 BASIC METABOLIC PNL TOTAL CA: CPT | Performed by: EMERGENCY MEDICINE

## 2018-07-12 PROCEDURE — 99219 PR INITIAL OBSERVATION CARE/DAY 50 MINUTES: CPT | Performed by: INTERNAL MEDICINE

## 2018-07-12 RX ORDER — 0.9 % SODIUM CHLORIDE 0.9 %
3 VIAL (ML) INJECTION AS NEEDED
Status: DISCONTINUED | OUTPATIENT
Start: 2018-07-12 | End: 2018-07-13 | Stop reason: HOSPADM

## 2018-07-12 RX ORDER — MELATONIN
1000 DAILY
Status: DISCONTINUED | OUTPATIENT
Start: 2018-07-13 | End: 2018-07-12

## 2018-07-12 RX ORDER — DILTIAZEM HYDROCHLORIDE 120 MG/1
120 CAPSULE, COATED, EXTENDED RELEASE ORAL DAILY
Status: DISCONTINUED | OUTPATIENT
Start: 2018-07-12 | End: 2018-07-13 | Stop reason: HOSPADM

## 2018-07-12 RX ORDER — LORAZEPAM 0.5 MG/1
0.5 TABLET ORAL EVERY 6 HOURS PRN
Status: DISCONTINUED | OUTPATIENT
Start: 2018-07-12 | End: 2018-07-13 | Stop reason: HOSPADM

## 2018-07-12 RX ORDER — FLUOXETINE HYDROCHLORIDE 20 MG/1
40 CAPSULE ORAL DAILY
Status: DISCONTINUED | OUTPATIENT
Start: 2018-07-12 | End: 2018-07-13 | Stop reason: HOSPADM

## 2018-07-12 RX ORDER — DILTIAZEM HYDROCHLORIDE 120 MG/1
120 CAPSULE, COATED, EXTENDED RELEASE ORAL DAILY
Status: DISCONTINUED | OUTPATIENT
Start: 2018-07-13 | End: 2018-07-12

## 2018-07-12 RX ORDER — FLECAINIDE ACETATE 50 MG/1
100 TABLET ORAL 2 TIMES DAILY
Status: DISCONTINUED | OUTPATIENT
Start: 2018-07-12 | End: 2018-07-13 | Stop reason: HOSPADM

## 2018-07-12 RX ORDER — FLUOXETINE HYDROCHLORIDE 20 MG/1
40 CAPSULE ORAL DAILY
Status: DISCONTINUED | OUTPATIENT
Start: 2018-07-13 | End: 2018-07-12

## 2018-07-12 RX ORDER — ALBUTEROL SULFATE 90 UG/1
2 AEROSOL, METERED RESPIRATORY (INHALATION) EVERY 6 HOURS PRN
Status: DISCONTINUED | OUTPATIENT
Start: 2018-07-12 | End: 2018-07-13 | Stop reason: HOSPADM

## 2018-07-12 RX ORDER — ASPIRIN 81 MG/1
324 TABLET, CHEWABLE ORAL ONCE
Status: COMPLETED | OUTPATIENT
Start: 2018-07-12 | End: 2018-07-12

## 2018-07-12 RX ORDER — MELATONIN
1000 DAILY
Status: DISCONTINUED | OUTPATIENT
Start: 2018-07-12 | End: 2018-07-13 | Stop reason: HOSPADM

## 2018-07-12 RX ORDER — POTASSIUM CHLORIDE 20 MEQ/1
20 TABLET, EXTENDED RELEASE ORAL DAILY
Status: DISCONTINUED | OUTPATIENT
Start: 2018-07-13 | End: 2018-07-13 | Stop reason: HOSPADM

## 2018-07-12 RX ADMIN — FLECAINIDE ACETATE 100 MG: 50 TABLET ORAL at 18:23

## 2018-07-12 RX ADMIN — ASPIRIN 81 MG 324 MG: 81 TABLET ORAL at 15:56

## 2018-07-12 RX ADMIN — DILTIAZEM HYDROCHLORIDE 120 MG: 120 CAPSULE, COATED, EXTENDED RELEASE ORAL at 18:36

## 2018-07-12 RX ADMIN — LORAZEPAM 0.5 MG: 0.5 TABLET ORAL at 21:47

## 2018-07-12 RX ADMIN — VITAMIN D, TAB 1000IU (100/BT) 1000 UNITS: 25 TAB at 18:36

## 2018-07-12 RX ADMIN — FLUOXETINE 40 MG: 20 CAPSULE ORAL at 18:36

## 2018-07-12 RX ADMIN — ALBUTEROL SULFATE 2 PUFF: 90 AEROSOL, METERED RESPIRATORY (INHALATION) at 18:35

## 2018-07-12 NOTE — ED PROVIDER NOTES
History  Chief Complaint   Patient presents with    Chest Pain     Pt states that she thinks she might be having a problem with her hiatal hernia  Pt states that she woke up this morning with chest pain  Pt also has a rash  History provided by:  Patient  Chest Pain   Pain location:  L chest  Pain quality: aching and burning    Pain radiates to:  Does not radiate  Pain severity:  Moderate  Onset quality:  Gradual  Duration:  2 days (Two days ago had severe pain, mild pain yesterday worsening and today but not as bad Tuesday)  Timing:  Intermittent  Progression:  Waxing and waning  Chronicity:  New  Context: at rest    Context: no movement, not raising an arm, no stress and no trauma    Relieved by:  None tried  Worsened by:  Nothing tried  Ineffective treatments:  None tried  Associated symptoms: palpitations    Associated symptoms: no abdominal pain, no cough, no diaphoresis, no dizziness, no fever, no headache, no nausea, no numbness, no shortness of breath and not vomiting    Risk factors comment:  History of atrial fibrillation atrial flutter      Prior to Admission Medications   Prescriptions Last Dose Informant Patient Reported? Taking?    CARTIA  MG 24 hr capsule  Self No Yes   Sig: TAKE 1 CAPSULE BY MOUTH DAILY   Cholecalciferol (VITAMIN D3) 1000 UNITS CAPS  Self Yes Yes   Sig: Take by mouth daily   FLUoxetine (PROzac) 20 MG tablet  Self Yes Yes   Sig: Take 40 mg by mouth daily     LORazepam (ATIVAN) 0 5 mg tablet  Self Yes Yes   Sig: Take 0 5 mg by mouth every 6 (six) hours as needed for anxiety   albuterol (PROVENTIL HFA,VENTOLIN HFA) 90 mcg/act inhaler  Self Yes Yes   Sig: Inhale 2 puffs every 6 (six) hours as needed for wheezing   beclomethasone (QVAR) 40 MCG/ACT inhaler  Self Yes Yes   Sig: Inhale 2 puffs 2 (two) times a day   estradiol (ESTRACE) 1 mg tablet  Self Yes Yes   Sig: Take 1 mg by mouth daily   flecainide (TAMBOCOR) 100 mg tablet  Self No Yes   Sig: TAKE 1 TABLET TWICE DAILY potassium chloride (K-DUR,KLOR-CON) 20 mEq tablet  Self Yes Yes   Sig: Take 20 mEq by mouth daily        Facility-Administered Medications: None       Past Medical History:   Diagnosis Date    Anxiety     Asthma     Atrial fibrillation (HCC)     Bowel obstruction (HCC)     Von Willebrand disease ([facility] [address] )        Past Surgical History:   Procedure Laterality Date    BUNIONECTOMY      HEMORROIDECTOMY      HYSTERECTOMY      POLYPECTOMY         Family History   Problem Relation Age of Onset    Heart attack Father     Heart attack Sister      I have reviewed and agree with the history as documented  Social History   Substance Use Topics    Smoking status: Former Smoker     Types: Cigarettes, Pipe, Cigars     Quit date: 2011    Smokeless tobacco: Never Used      Comment: 1 pack/day for 5 years, and about 1/2 pack for 30 years    Alcohol use 8 4 oz/week     14 Glasses of wine per week        Review of Systems   Constitutional: Negative for activity change, chills, diaphoresis and fever  HENT: Negative for congestion, sinus pressure and sore throat  Eyes: Negative for pain and visual disturbance  Respiratory: Negative for cough, chest tightness, shortness of breath, wheezing and stridor  Cardiovascular: Positive for chest pain and palpitations  Gastrointestinal: Negative for abdominal distention, abdominal pain, constipation, diarrhea, nausea and vomiting  Genitourinary: Negative for dysuria and frequency  Musculoskeletal: Negative for neck pain and neck stiffness  Skin: Negative for rash  Neurological: Negative for dizziness, speech difficulty, light-headedness, numbness and headaches  Physical Exam  Physical Exam   Constitutional: She is oriented to person, place, and time  She appears well-developed  No distress  HENT:   Head: Normocephalic and atraumatic  Eyes: Pupils are equal, round, and reactive to light  Neck: Normal range of motion  Neck supple   No tracheal deviation present  Cardiovascular: Normal heart sounds and intact distal pulses  An irregularly irregular rhythm present  Tachycardia present  No murmur heard  Pulmonary/Chest: Effort normal and breath sounds normal  No stridor  No respiratory distress  Abdominal: Soft  She exhibits no distension  There is no tenderness  There is no rebound and no guarding  Musculoskeletal: Normal range of motion  Neurological: She is alert and oriented to person, place, and time  Skin: Skin is warm and dry  She is not diaphoretic  No erythema  No pallor  Psychiatric: She has a normal mood and affect  Vitals reviewed        Vital Signs  ED Triage Vitals   Temperature Pulse Respirations Blood Pressure SpO2   07/12/18 1316 07/12/18 1315 07/12/18 1315 07/12/18 1315 07/12/18 1315   98 1 °F (36 7 °C) (!) 45 16 166/76 100 %      Temp Source Heart Rate Source Patient Position - Orthostatic VS BP Location FiO2 (%)   07/12/18 1316 07/12/18 1315 07/12/18 1315 07/12/18 1315 --   Oral Monitor Sitting Left arm       Pain Score       07/12/18 1621       2           Vitals:    07/12/18 1345 07/12/18 1400 07/12/18 1519 07/12/18 1657   BP: 140/91 156/76  146/67   Pulse: 94 101 (!) 111 78   Patient Position - Orthostatic VS:  Sitting  Sitting       Visual Acuity      ED Medications  Medications   sodium chloride (PF) 0 9 % injection 3 mL (not administered)   aspirin chewable tablet 324 mg (324 mg Oral Given 7/12/18 1556)       Diagnostic Studies  Results Reviewed     Procedure Component Value Units Date/Time    Troponin I [[id number]]  (Normal) Collected:  07/12/18 1431    Lab Status:  Final result Specimen:  Blood from Arm, Right Updated:  07/12/18 1455     Troponin I <0 02 ng/mL     Basic metabolic panel [[id number]] Collected:  07/12/18 1431    Lab Status:  Final result Specimen:  Blood from Arm, Right Updated:  07/12/18 1446     Sodium 136 mmol/L      Potassium 4 1 mmol/L      Chloride 102 mmol/L      CO2 24 mmol/L      Anion Gap 10 mmol/L BUN 9 mg/dL      Creatinine 0 76 mg/dL      Glucose 93 mg/dL      Calcium 9 6 mg/dL      eGFR 82 ml/min/1 73sq m     Narrative:         National Kidney Disease Education Program recommendations are as follows:  GFR calculation is accurate only with a steady state creatinine  Chronic Kidney disease less than 60 ml/min/1 73 sq  meters  Kidney failure less than 15 ml/min/1 73 sq  meters      Protime-INR [[id number]]  (Normal) Collected:  07/12/18 1403    Lab Status:  Final result Specimen:  Blood from Arm, Right Updated:  07/12/18 1431     Protime 12 1 seconds      INR 0 92    APTT [[id number]]  (Normal) Collected:  07/12/18 1403    Lab Status:  Final result Specimen:  Blood from Arm, Right Updated:  07/12/18 1431     PTT 30 seconds     CBC and differential [[id number]]  (Abnormal) Collected:  07/12/18 1403    Lab Status:  Final result Specimen:  Blood from Arm, Right Updated:  07/12/18 1410     WBC 10 50 (H) Thousand/uL      RBC 4 34 Million/uL      Hemoglobin 14 0 g/dL      Hematocrit 42 1 %      MCV 97 fL      MCH 32 3 pg      MCHC 33 3 g/dL      RDW 13 6 %      MPV 10 2 fL      Platelets 112 Thousands/uL      Neutrophils Relative 80 (H) %      Lymphocytes Relative 10 (L) %      Monocytes Relative 10 %      Eosinophils Relative 1 %      Basophils Relative 1 %      Neutrophils Absolute 8 35 (H) Thousands/µL      Lymphocytes Absolute 1 00 Thousands/µL      Monocytes Absolute 1 00 Thousand/µL      Eosinophils Absolute 0 10 Thousand/µL      Basophils Absolute 0 05 Thousands/µL                  X-ray chest 2 views   ED Interpretation by Kellen Healy DO (07/12 9179)   No acute pathology      Final Result by Angeles Way MD (07/12 8506)      No acute consolidation or congestion seen            Workstation performed: [id number]                    Procedures  ECG 12 Lead Documentation  Date/Time: 7/12/2018 2:09 PM  Performed by: Jeniffer Ibanez by: Timothy Byrne     ECG reviewed by me, the ED Provider: yes    Patient location:  ED  Interpretation:     Interpretation: abnormal    Rate:     ECG rate:  87    ECG rate assessment: normal    Rhythm:     Rhythm: atrial flutter      Rhythm comment:  Variable block atrial flutter  Ectopy:     Ectopy: none    QRS:     QRS axis:  Normal    QRS intervals:  Normal  Conduction:     Conduction: normal    ST segments:     ST segments:  Non-specific  T waves:     T waves: non-specific             Phone Contacts  ED Phone Contact    ED Course  ED Course as of Jul 12 1708   Thu Jul 12, 2018   1505  Blood work unremarkable elevated heart score will admit the hospital for further evaluation          HEART Risk Score      Most Recent Value   History  1 Filed at: 07/12/2018 1506   ECG  1 Filed at: 07/12/2018 1506   Age  2 Filed at: 07/12/2018 1506   Risk Factors  1 Filed at: 07/12/2018 1506   Troponin  0 Filed at: 07/12/2018 1506   Heart Score Risk Calculator   History  1 Filed at: 07/12/2018 1506   ECG  1 Filed at: 07/12/2018 1506   Age  2 Filed at: 07/12/2018 1506   Risk Factors  1 Filed at: 07/12/2018 1506   Troponin  0 Filed at: 07/12/2018 1506   HEART Score  5 Filed at: 07/12/2018 1506   HEART Score  5 Filed at: 07/12/2018 1506                            MDM  Number of Diagnoses or Management Options  Acute chest pain: new and requires workup  Atypical atrial flutter ([address] ): new and requires workup     Amount and/or Complexity of Data Reviewed  Clinical lab tests: ordered and reviewed  Tests in the radiology section of CPT®: ordered and reviewed  Decide to obtain previous medical records or to obtain history from someone other than the patient: yes  Obtain history from someone other than the patient: yes  Review and summarize past medical records: yes  Discuss the patient with other providers: yes  Independent visualization of images, tracings, or specimens: yes      CritCare Time    Disposition  Final diagnoses:   Acute chest pain   Atypical atrial flutter ([address] )     Time reflects when diagnosis was documented in both MDM as applicable and the Disposition within this note     Time User Action Codes Description Comment    7/12/2018  3:24 PM Maryland Schwab Add [R07 9] Acute chest pain     7/12/2018  3:24 PM Maryland Schwab Add [I48 4] Atypical atrial flutter [facility])       ED Disposition     ED Disposition Condition Comment    Admit  Case was discussed with Dr Carlos Hernandez and the patient's admission status was agreed to be Admission Status: observation status to the service of Dr Carlos Hernandez  Follow-up Information    None         Current Discharge Medication List      CONTINUE these medications which have NOT CHANGED    Details   albuterol (PROVENTIL HFA,VENTOLIN HFA) 90 mcg/act inhaler Inhale 2 puffs every 6 (six) hours as needed for wheezing      beclomethasone (QVAR) 40 MCG/ACT inhaler Inhale 2 puffs 2 (two) times a day      CARTIA  MG 24 hr capsule TAKE 1 CAPSULE BY MOUTH DAILY  Qty: 90 capsule, Refills: 3    Associated Diagnoses: Paroxysmal atrial fibrillation (HCC)      Cholecalciferol (VITAMIN D3) 1000 UNITS CAPS Take by mouth daily      estradiol (ESTRACE) 1 mg tablet Take 1 mg by mouth daily      flecainide (TAMBOCOR) 100 mg tablet TAKE 1 TABLET TWICE DAILY  Qty: 60 tablet, Refills: 5    Associated Diagnoses: Paroxysmal atrial fibrillation (HCC)      FLUoxetine (PROzac) 20 MG tablet Take 40 mg by mouth daily        LORazepam (ATIVAN) 0 5 mg tablet Take 0 5 mg by mouth every 6 (six) hours as needed for anxiety      potassium chloride (K-DUR,KLOR-CON) 20 mEq tablet Take 20 mEq by mouth daily             No discharge procedures on file      ED Provider  Electronically Signed by           Anupam Downing DO  07/12/18 7599

## 2018-07-12 NOTE — PLAN OF CARE
Problem: PAIN - ADULT  Goal: Verbalizes/displays adequate comfort level or baseline comfort level  Interventions:  - Encourage patient to monitor pain and request assistance  - Assess pain using appropriate pain scale  - Administer analgesics based on type and severity of pain and evaluate response  - Implement non-pharmacological measures as appropriate and evaluate response  - Consider cultural and social influences on pain and pain management  - Notify physician/advanced practitioner if interventions unsuccessful or patient reports new pain  Outcome: Progressing      Problem: SAFETY ADULT  Goal: Patient will remain free of falls  INTERVENTIONS:  - Assess patient frequently for physical needs  -  Identify cognitive and physical deficits and behaviors that affect risk of falls    -  [place] fall precautions as indicated by assessment   - Educate patient/family on patient safety including physical limitations  - Instruct patient to call for assistance with activity based on assessment  - Modify environment to reduce risk of injury  - Consider OT/PT consult to assist with strengthening/mobility  Outcome: Progressing

## 2018-07-12 NOTE — Clinical Note
Case was discussed with Dr Shantal Curtis and the patient's admission status was agreed to be Admission Status: observation status to the service of Dr Shantal Curtis

## 2018-07-12 NOTE — ED NOTES
Patient transported to [facility] , [address] [facility]  07/12/18 [id number]

## 2018-07-12 NOTE — ASSESSMENT & PLAN NOTE
AFib with RVR now converted to sinus  Patient wants to go home  Will monitor on tele overnight    Encourage fluid intake  Check cardiac enzymes  If remains stable, may DC tomorrow  Known to Dr Herber Espinoza  Not on [facility] due to von Willebrand's disease

## 2018-07-12 NOTE — ED NOTES
Pt stating "I want to go home, I think I'm out of a-flutter and I feel good now " Pt is sitting in stretcher with no signs of distress at the moment  RN and ER provider made aware       Radha York  07/12/18 1558

## 2018-07-12 NOTE — H&P
H&P- Valerie Rodriguez 1950, 79 y o  female MRN: [id number]    Unit/Bed#: -01 Encounter: [id number]    Primary Care Provider: Francis Diggs MD   Date and time admitted to hospital: 7/12/2018  1:28 PM        * Paroxysmal atrial fibrillation [facility])   Assessment & Plan    AFib with RVR now converted to sinus  Patient wants to go home  Will monitor on tele overnight  Encourage fluid intake  Check cardiac enzymes  If remains stable, may DC tomorrow  Known to Dr Chely Parsons  Not on [facility] due to von Willebrand's disease        Hiatal hernia   Assessment & Plan    PPI          Von Willebrand disease ([address] )   Assessment & Plan    Known to Dr Rosie Green            VTE Prophylaxis:  Encourage ambulation  Code Status:  Full code  POLST: POLST form is not discussed and not completed at this time  Discussion with family:    Anticipated Length of Stay:  Patient will be admitted on an Observation basis with an anticipated length of stay of  < 2 midnights  Justification for Hospital Stay: Above    Total Time for Visit, including Counseling / Coordination of Care: 20 minutes  Greater than 50% of this total time spent on direct patient counseling and coordination of care  Chief Complaint:   Chest pain    History of Present Illness:    Valerie Rodriguez is a 79 y o  female with AFib, hiatal hernia and von Willebrand's disease who presents with intermittent chest pain and palpitation  Her chest pain started several months ago  She had extensive outpatient workup including a negative stress test and upper GI series indicates possible small hiatal hernia  She had a similar pain this morning associated with lightheadedness  "  My head was foggy"  She went to urgent care and was found in rapid AFib  She has already been converted to sinus at this point spontaneously  Denies chest pain current  Review of Systems:    Review of Systems   Constitutional: Negative for chills and diaphoresis     Respiratory: Negative for chest tightness, shortness of breath and wheezing  Cardiovascular: Positive for chest pain  All other systems reviewed and are negative  Past Medical and Surgical History:     Past Medical History:   Diagnosis Date    Anxiety     Asthma     [facility]     [facility]     Von Willebrand disease ([facility] )        Past Surgical History:   Procedure Laterality Date    BUNIONECTOMY      HEMORROIDECTOMY      HYSTERECTOMY      POLYPECTOMY         Meds/Allergies:    Prior to Admission medications    Medication Sig Start Date End Date Taking?  Authorizing Provider   albuterol (PROVENTIL HFA,VENTOLIN HFA) 90 mcg/act inhaler Inhale 2 puffs every 6 (six) hours as needed for wheezing   Yes Historical Provider, MD   beclomethasone (QVAR) 40 MCG/ACT inhaler Inhale 2 puffs 2 (two) times a day   Yes Historical Provider, MD   CARTIA  MG 24 hr capsule TAKE 1 CAPSULE BY MOUTH DAILY 4/23/18  Yes Thuy Iraheta MD   Cholecalciferol (VITAMIN D3) 1000 UNITS CAPS Take by mouth daily   Yes Historical Provider, MD   estradiol (ESTRACE) 1 mg tablet Take 1 mg by mouth daily   Yes Historical Provider, MD   flecainide (TAMBOCOR) 100 mg tablet TAKE 1 TABLET TWICE DAILY 5/8/18  Yes Thuy Irahtea MD   FLUoxetine (PROzac) 20 MG tablet Take 40 mg by mouth daily     Yes Historical Provider, MD   LORazepam (ATIVAN) 0 5 mg tablet Take 0 5 mg by mouth every 6 (six) hours as needed for anxiety   Yes Historical Provider, MD   potassium chloride (K-DUR,KLOR-CON) 20 mEq tablet Take 20 mEq by mouth daily     Yes Historical Provider, MD   aspirin 81 MG tablet Take by mouth daily  7/12/18  Historical Provider, MD   famotidine (PEPCID AC) 10 MG chewable tablet Chew 10 mg daily    7/12/18  Historical Provider, MD   Probiotic Product (PROBIOTIC PO) Take by mouth daily  7/12/18  Historical Provider, MD   propafenone (RYTHMOL) 150 mg tablet Take 150 mg by mouth daily as needed Take 4 tablets by mouth 1 time as needed for a fib 7/12/18  Historical Provider, MD         Allergies: Allergies   Allergen Reactions    Bactrim [Sulfamethoxazole-Trimethoprim]     Wellbutrin [Bupropion]        Social History:     Marital Status: /  Occupation:Nurse  Patient Pre-hospital Living Situation: Home    History   Alcohol Use    8 4 oz/week    14 Glasses of wine per week     History   Smoking Status    Former Smoker    Types: Cigarettes, Pipe, Cigars    Quit date: 2011   Smokeless Tobacco    Never Used     Comment: 1 pack/day for 5 years, and about 1/2 pack for 30 years     History   Drug Use No       Family History:    Family History   Problem Relation Age of Onset    Heart attack Father     Heart attack Sister        Physical Exam:     Vitals:   Blood Pressure: 146/67 (07/12/18 1657)  Pulse: 78 (07/12/18 1657)  Temperature: 99 1 °F (37 3 °C) (07/12/18 1657)  Temp Source: Oral (07/12/18 1657)  Respirations: 18 (07/12/18 1657)  Height: 5' 5" (165 1 cm) (07/12/18 1657)  Weight - Scale: 59 9 kg (132 lb) (07/12/18 1657)  SpO2: 99 % (07/12/18 1657)    Physical Exam   Constitutional: She is oriented to person, place, and time  No distress  Eyes: Conjunctivae and EOM are normal  Pupils are equal, round, and reactive to light  Neck: Normal range of motion  Neck supple  Cardiovascular: Normal rate and regular rhythm  Pulmonary/Chest: Effort normal and breath sounds normal  No respiratory distress  Abdominal: Soft  Bowel sounds are normal  She exhibits no distension  Musculoskeletal: She exhibits no edema  Neurological: She is alert and oriented to person, place, and time  Skin: Skin is warm and dry  She is not diaphoretic  Psychiatric: She has a normal mood and affect  Additional Data:     Lab Results: I have personally reviewed pertinent reports          Results from last 7 days  Lab Units 07/12/18  1403   WBC Thousand/uL 10 50*   HEMOGLOBIN g/dL 14 0   HEMATOCRIT % 42 1   PLATELETS Thousands/uL 256   NEUTROS PCT % 80*   LYMPHS PCT % 10*   MONOS PCT % 10   EOS PCT % 1       Results from last 7 days  Lab Units 07/12/18  1431   SODIUM mmol/L 136   POTASSIUM mmol/L 4 1   CHLORIDE mmol/L 102   CO2 mmol/L 24   BUN mg/dL 9   CREATININE mg/dL 0 76   CALCIUM mg/dL 9 6   GLUCOSE RANDOM mg/dL 93       Results from last 7 days  Lab Units 07/12/18  1403   INR  0 92               Imaging:     X-ray chest 2 views   ED Interpretation by Bassem Ramsay DO (07/12 1452)   No acute pathology      Final Result by Dann Guevara MD (07/12 1528)      No acute consolidation or congestion seen            Workstation performed: [id number]               ** Please Note: This note has been constructed using a voice recognition system   **

## 2018-07-12 NOTE — PROGRESS NOTES
[address] [facility]        NAME: Caren Maya is a 79 y o  female  : 1950    MRN: [id number]  DATE: 2018  TIME: 12:45 PM    Assessment and Plan   Chest pain, unspecified type [R07 9]  1  Chest pain, unspecified type  POCT ECG    Transfer to other facility         Patient Instructions   I had a long discussion with the patient regarding her symptoms and by inability to rule out a cardiac diagnoses  EKG performed in office  The patient's  is with her and will drive her to [facility] refused transportation via ambulance  Chief Complaint   No chief complaint on file  History of Present Illness   The patient is a 80-year-old female who presents with multiple complaints including chest pain for several days  She states that she has had an extensive workup for her chronic abdominal pain and epigastric pain  She was diagnosed with a small hiatal hernia in which she was prescribed Nexium  Several days ago she developed increased epigastric pain radiating to her right jaw and arm  This pain is precipitated primarily by eating and lying flat, however, she does admit to the pain waking her from her sleep the other night  Negative shortness of breath  Negative increased chest pain with exertion  Positive nausea without vomiting  Positive headache  She also states that 2 days ago she felt increased confusion and numbness and tingling of her face  Positive generalized abdominal pain  Positive diarrhea as well as constipation as she has a history of IBS  Positive rash on her torso  She states that she does occasionally have rashes that come and go  She denies new drug or food ingestion  Negative new detergents or soaps  The rash is itchy  The patient also has a history anxiety in which she takes Ativan for  She states that she has had increased anxiety recently  She also has a past medical history of anemia, von Willebrand's disease   She denies melena  HPI    Review of Systems   Review of Systems   Constitutional: Positive for activity change, appetite change and fatigue  Negative for chills, diaphoresis, fever and unexpected weight change  HENT: Negative for congestion, tinnitus, trouble swallowing and voice change  Eyes: Positive for redness  Respiratory: Positive for chest tightness  Negative for cough and shortness of breath  Gastrointestinal: Positive for abdominal pain, anal bleeding (Hemorrhoids), constipation, diarrhea and nausea  Negative for abdominal distention, blood in stool, rectal pain and vomiting  Genitourinary: Negative for difficulty urinating, flank pain and hematuria  Musculoskeletal: Positive for arthralgias, back pain, myalgias and neck pain  Skin: Positive for rash  Neurological: Positive for dizziness, weakness (Generalized), numbness and headaches  Negative for tremors and syncope  Psychiatric/Behavioral: Positive for agitation, confusion and sleep disturbance  The patient is nervous/anxious and is hyperactive  All other systems reviewed and are negative          Current Medications       Current Outpatient Prescriptions:     FLUoxetine (PROzac) 20 MG tablet, Take 40 mg by mouth daily  , Disp: , Rfl:     albuterol (PROVENTIL HFA,VENTOLIN HFA) 90 mcg/act inhaler, Inhale 2 puffs every 6 (six) hours as needed for wheezing, Disp: , Rfl:     beclomethasone (QVAR) 40 MCG/ACT inhaler, Inhale 2 puffs 2 (two) times a day, Disp: , Rfl:     CARTIA  MG 24 hr capsule, TAKE 1 CAPSULE BY MOUTH DAILY, Disp: 90 capsule, Rfl: 3    Cholecalciferol (VITAMIN D3) 1000 UNITS CAPS, Take by mouth daily, Disp: , Rfl:     estradiol (ESTRACE) 1 mg tablet, Take 1 mg by mouth daily, Disp: , Rfl:     flecainide (TAMBOCOR) 100 mg tablet, TAKE 1 TABLET TWICE DAILY, Disp: 60 tablet, Rfl: 5    LORazepam (ATIVAN) 0 5 mg tablet, Take 0 5 mg by mouth every 6 (six) hours as needed for anxiety, Disp: , Rfl:     potassium chloride (K-DUR,KLOR-CON) 20 mEq tablet, Take 20 mEq by mouth 2 (two) times a day, Disp: , Rfl:     Current Allergies     Allergies as of 07/12/2018 - Reviewed 07/12/2018   Allergen Reaction Noted    Bactrim [sulfamethoxazole-trimethoprim]  10/01/2016    Wellbutrin [bupropion]  10/01/2016            The following portions of the patient's history were reviewed and updated as appropriate: allergies, current medications, past family history, past medical history, past social history, past surgical history and problem list      Past Medical History:   Diagnosis Date    Anxiety     Asthma     [facility]     [facility]     [facility]brand [facility] ([facility] )        Past Surgical History:   Procedure Laterality Date    BUNIONECTOMY      HEMORROIDECTOMY      HYSTERECTOMY      POLYPECTOMY         Family History   Problem Relation Age of Onset    Heart attack Father     Heart attack Sister          Medications have been verified  Objective   /80   Pulse 76   Temp (!) 97 °F (36 1 °C)   Resp 16   Ht 5' 5" (1 651 m)   Wt 59 9 kg (132 lb)   SpO2 98%   BMI 21 97 kg/m²        Physical Exam     Physical Exam   Constitutional: She is oriented to person, place, and time  She appears well-developed and well-nourished  She appears distressed (Appears anxious)  HENT:   Head: Normocephalic and atraumatic  Eyes: Conjunctivae and EOM are normal  Pupils are equal, round, and reactive to light  Right eye exhibits no discharge  Left eye exhibits no discharge  No scleral icterus  Neck: Normal range of motion  Neck supple  No JVD present  No tracheal deviation present  Cardiovascular: Normal rate, regular rhythm, normal heart sounds and intact distal pulses  Exam reveals no gallop and no friction rub  No murmur heard  Pulmonary/Chest: Effort normal and breath sounds normal  No stridor  No respiratory distress  She has no wheezes  She has no rales  She exhibits no tenderness     Abdominal: Soft  Bowel sounds are normal  She exhibits no distension and no mass  There is no tenderness  There is no rebound and no guarding  Musculoskeletal: Normal range of motion  She exhibits no edema, tenderness or deformity  Neurological: She is alert and oriented to person, place, and time  She has normal strength  Coordination and gait normal    Skin: Skin is warm and dry  Rash noted  No lesion noted  Rash is urticarial  She is not diaphoretic  Psychiatric: Her mood appears anxious  Her speech is rapid and/or pressured  She is agitated and hyperactive  Nursing note and vitals reviewed

## 2018-07-12 NOTE — PATIENT INSTRUCTIONS
I had a long discussion with the patient regarding her symptoms and by inability to rule out a cardiac diagnoses  EKG performed in office  The patient's  is with her and will drive her to [facility] refused transportation via ambulance

## 2018-07-13 VITALS
HEIGHT: 65 IN | OXYGEN SATURATION: 98 % | RESPIRATION RATE: 18 BRPM | SYSTOLIC BLOOD PRESSURE: 128 MMHG | WEIGHT: 132 LBS | DIASTOLIC BLOOD PRESSURE: 60 MMHG | TEMPERATURE: 97.8 F | BODY MASS INDEX: 21.99 KG/M2 | HEART RATE: 71 BPM

## 2018-07-13 LAB
MAGNESIUM SERPL-MCNC: 1.9 MG/DL (ref 1.6–2.6)
TSH SERPL DL<=0.05 MIU/L-ACNC: 2.25 UIU/ML (ref 0.36–3.74)

## 2018-07-13 PROCEDURE — 83735 ASSAY OF MAGNESIUM: CPT | Performed by: INTERNAL MEDICINE

## 2018-07-13 PROCEDURE — 93010 ELECTROCARDIOGRAM REPORT: CPT | Performed by: INTERNAL MEDICINE

## 2018-07-13 PROCEDURE — 84443 ASSAY THYROID STIM HORMONE: CPT | Performed by: INTERNAL MEDICINE

## 2018-07-13 PROCEDURE — 99217 PR OBSERVATION CARE DISCHARGE MANAGEMENT: CPT | Performed by: INTERNAL MEDICINE

## 2018-07-13 RX ADMIN — FLECAINIDE ACETATE 100 MG: 50 TABLET ORAL at 08:10

## 2018-07-13 RX ADMIN — DILTIAZEM HYDROCHLORIDE 120 MG: 120 CAPSULE, COATED, EXTENDED RELEASE ORAL at 08:10

## 2018-07-13 RX ADMIN — POTASSIUM CHLORIDE 20 MEQ: 1500 TABLET, EXTENDED RELEASE ORAL at 08:10

## 2018-07-13 RX ADMIN — FLUOXETINE 40 MG: 20 CAPSULE ORAL at 08:10

## 2018-07-13 RX ADMIN — VITAMIN D, TAB 1000IU (100/BT) 1000 UNITS: 25 TAB at 08:10

## 2018-07-13 NOTE — PLAN OF CARE
CARDIOVASCULAR - ADULT     Maintains optimal cardiac output and hemodynamic stability Adequate for Discharge     Absence of cardiac dysrhythmias or at baseline rhythm Adequate for Discharge        DISCHARGE PLANNING - CARE MANAGEMENT     Discharge to post-acute care or home with appropriate resources Adequate for Discharge        PAIN - ADULT     Verbalizes/displays adequate comfort level or baseline comfort level Adequate for Discharge        Potential for Falls     Patient will remain free of falls Adequate for Discharge        SAFETY ADULT     Patient will remain free of falls Adequate for Discharge

## 2018-07-13 NOTE — PLAN OF CARE
CARDIOVASCULAR - ADULT     Maintains optimal cardiac output and hemodynamic stability Progressing     Absence of cardiac dysrhythmias or at baseline rhythm Progressing        PAIN - ADULT     Verbalizes/displays adequate comfort level or baseline comfort level Progressing        Potential for Falls     Patient will remain free of falls Progressing        SAFETY ADULT     Patient will remain free of falls Progressing

## 2018-07-13 NOTE — DISCHARGE SUMMARY
Discharge Summary - [facility]    Patient Information: Deon Keene 79 y o  female MRN: [id number]  Unit/Bed#: -01 Encounter: [id number]    Discharging Physician / Practitioner: Niki Hill MD  PCP: Gene Lakhani MD  Admission Date: 7/12/2018  Discharge Date: 07/13/18    Disposition:     Home    Reason for Admission:  Chest pain    Discharge Diagnoses:     Principal Problem:    Paroxysmal atrial fibrillation [facility])  Active Problems:    Von Willebrand disease ([address] )    Hiatal hernia  Resolved Problems:    * No resolved hospital problems  *      CHospital Course:     Deon Keene is a 79 y o  female patient with past medical history of AFib, hiatal hernia and von Willebrand's disease who originally presented to the hospital on 7/12/2018 due to intermittent chest pain and palpitation  Her chest pain started several months ago  She had extensive outpatient workup including a negative stress test and upper GI series indicating possible small hiatal hernia  Patient had a similar pain again with some lightheadedness with her head feeling foggy  She went to urgent care and was found in rapid AFib  Patient has already been converted to sinus rhythm spontaneously when seen  Her cardiac enzymes were negative  Patient has already been maintained on Cardizem and Flucainide  She does reports possible dehydration and inadequate fluid intake  She is recommended to make appointment see Dr Igor Pedroza her cardiologist for follow-up  Discharge Day Visit / Exam:     Subjective:  Cp has improved   No palpitation this morning  Vitals: Blood Pressure: 128/60 (07/13/18 0715)  Pulse: 71 (07/13/18 0715)  Temperature: 97 8 °F (36 6 °C) (07/13/18 0715)  Temp Source: Oral (07/13/18 0715)  Respirations: 18 (07/13/18 0715)  Height: 5' 5" (165 1 cm) (07/12/18 1657)  Weight - Scale: 59 9 kg (132 lb) (07/12/18 1657)  SpO2: 98 % (07/13/18 0715)  Exam:   Physical Exam   Constitutional: She is oriented to person, place, and time  No distress  Eyes: Pupils are equal, round, and reactive to light  Neck: Normal range of motion  No JVD present  Cardiovascular: Normal rate and regular rhythm  Pulmonary/Chest: Effort normal  No respiratory distress  Abdominal: Soft  Bowel sounds are normal    Neurological: She is alert and oriented to person, place, and time  Skin: Skin is warm  She is not diaphoretic  Psychiatric: She has a normal mood and affect  Discharge instructions/Information to patient and family:   See after visit summary for information provided to patient and family  Provisions for Follow-Up Care:  See after visit summary for information related to follow-up care and any pertinent home health orders  Planned Readmission: No     Discharge Statement:  I spent 20 minutes discharging the patient  This time was spent on the day of discharge  I had direct contact with the patient on the day of discharge  Greater than 50% of the total time was spent examining patient, answering all patient questions, arranging and discussing plan of care with patient as well as directly providing post-discharge instructions  Additional time then spent on discharge activities  Discharge Medications:  See after visit summary for reconciled discharge medications provided to patient and family        ** Please Note: This note has been constructed using a voice recognition system **

## 2018-07-13 NOTE — CASE MANAGEMENT
Initial Clinical Review    Admission: Date/Time/Statement:  07/12/2018 @ 15:25   Observation    Orders Placed This Encounter   Procedures    Place in Observation (expected length of stay for this patient is less than two midnights)     Standing Status:   Standing     Number of Occurrences:   1     Order Specific Question:   Admitting Physician     Answer:   Raul Lynch     Order Specific Question:   Level of Care     Answer:   Med Surg [16]    Place in Observation (expected length of stay for this patient is less than two midnights)     Standing Status:   Standing     Number of Occurrences:   1     Order Specific Question:   Admitting Physician     Answer:   Johana Real [[id number]]     Order Specific Question:   Level of Care     Answer:   Med Surg [16]         ED: Date/Time/Mode of Arrival:   ED Arrival Information     Expected Arrival Acuity Means of Arrival Escorted By Service Admission Type    7/12/2018 12:52 7/12/2018 13:04 Urgent Walk-In Self General Medicine Urgent    Arrival Complaint    chest pain          Chief Complaint:   Chief Complaint   Patient presents with    Chest Pain     Pt states that she thinks she might be having a problem with her hiatal hernia  Pt states that she woke up this morning with chest pain  Pt also has a rash  History of Illness:  79 y o  female with AFib, hiatal hernia and von Willebrand's disease who presents with intermittent chest pain and palpitation  Her chest pain started several months ago  She had extensive outpatient workup including a negative stress test and upper GI series indicates possible small hiatal hernia  She had a similar pain this morning associated with lightheadedness  "  My head was foggy"  She went to urgent care and was found in rapid AFib  She has already been converted to sinus at this point spontaneously       ED Vital Signs:   ED Triage Vitals   Temperature Pulse Respirations Blood Pressure SpO2   07/12/18 1316 07/12/18 1315 07/12/18 1315 07/12/18 1315 07/12/18 1315   98 1 °F (36 7 °C) (!) 45 16 166/76 100 %      Temp Source Heart Rate Source Patient Position - Orthostatic VS BP Location FiO2 (%)   07/12/18 1316 07/12/18 1315 07/12/18 1315 07/12/18 1315 --   Oral Monitor Sitting Left arm       Pain Score       07/12/18 1621       2        Wt Readings from Last 1 Encounters:   07/12/18 59 9 kg (132 lb)       Vital Signs (abnormal):   Date and Time Temp Pulse SpO2 Resp BP   07/12/18 1519 --  111 99 % -- --   07/12/18 1400 -- 101 98 % 16 156/76   07/12/18 1345 -- 94 99 % 16 140/91   07/12/18 1315 --  45 100 % 16 166/76         Abnormal Labs/Diagnostic Test Results: WBC 10 50, Neutro PCT 80    EKG: Sinus rhythm with frequent Premature atrial complexes     ED Treatment:   Medication Administration - No Administrations Displayed (No Start Event Found)     None          Past Medical/Surgical History: Active Ambulatory Problems     Diagnosis Date Noted    Paroxysmal atrial fibrillation ([facility] ) 10/01/2016    Asthma attack 10/01/2016    Bronchitis 10/01/2016    Von Willebrand disease ([facility] ) 10/02/2016     Resolved Ambulatory Problems     Diagnosis Date Noted    Lactic acid acidosis 10/02/2016    [facility]ic [facility] response syndrome[facility] 10/02/2016     Past Medical History:   Diagnosis Date    Anxiety     Asthma     Atrial fibrillation (HCC)     Bowel obstruction (HCC)     [facility]brand [facility]        Admitting Diagnosis: Chest pain [R07 9]  Acute chest pain [R07 9]  Atypical atrial flutter ([facility] ) [I48 4]    Age/Sex: 79 y o  female    Assessment/Plan:            * Paroxysmal atrial fibrillation ([facility] )   Assessment & Plan     AFib with RVR now converted to sinus  Patient wants to go home  Will monitor on tele overnight    Encourage fluid intake  Check cardiac enzymes  If remains stable, may DC tomorrow  Known to Dr Brayden Romano  Not on [facility] due to von Willebrand's disease          Hiatal hernia   Assessment & Plan     PPI           Von Willebrand disease ([facility] )   Assessment & Plan     Known to Dr Mary Alice Ayala                VTE Prophylaxis:  Encourage ambulation  Code Status:  Full code  POLST: POLST form is not discussed and not completed at this time  Discussion with family:     Anticipated Length of Stay:  Patient will be admitted on an Observation basis with an anticipated length of stay of  < 2 midnights     Justification for Hospital Stay: Above      Admission Orders:  Observation/Tele  Continuous Cardiac Monitoring  Serial Cardiac Enzymes q3h x 3  Bilateral Sequential Compression Device    Scheduled Meds:   Current Facility-Administered Medications:  cholecalciferol 1,000 Units Oral Daily   diltiazem 120 mg Oral Daily   flecainide 100 mg Oral BID   FLUoxetine 40 mg Oral Daily   potassium chloride 20 mEq Oral Daily     Ativan 0 5 mg po x 1 thus far

## 2018-07-13 NOTE — PROGRESS NOTES
Pt called nurse in because she was feeling heart palpitations for a brief moment  RN assessed pt and checked heart monitor  It showed pt going in and out between atrial flutter and sinus rhythm  Pt was no longer symptomatic  Will continue to monitor

## 2018-07-13 NOTE — NURSING NOTE
Discharge instructions reviewed with pt and pt stated understanding   Pt reports she already made follow up appt with her cardiologist

## 2018-07-14 LAB
ATRIAL RATE: 340 BPM
ATRIAL RATE: 375 BPM
P AXIS: 246 DEGREES
QRS AXIS: 64 DEGREES
QRS AXIS: 75 DEGREES
QRSD INTERVAL: 76 MS
QRSD INTERVAL: 86 MS
QT INTERVAL: 316 MS
QT INTERVAL: 316 MS
QTC INTERVAL: 380 MS
QTC INTERVAL: 382 MS
T WAVE AXIS: 5 DEGREES
T WAVE AXIS: 63 DEGREES
VENTRICULAR RATE: 87 BPM
VENTRICULAR RATE: 88 BPM

## 2018-07-14 PROCEDURE — 93010 ELECTROCARDIOGRAM REPORT: CPT | Performed by: INTERNAL MEDICINE

## 2018-08-05 ENCOUNTER — OFFICE VISIT (OUTPATIENT)
Dept: URGENT CARE | Facility: MEDICAL CENTER | Age: 68
End: 2018-08-05
Payer: COMMERCIAL

## 2018-08-05 VITALS
HEART RATE: 68 BPM | RESPIRATION RATE: 18 BRPM | SYSTOLIC BLOOD PRESSURE: 112 MMHG | WEIGHT: 133.6 LBS | HEIGHT: 65 IN | DIASTOLIC BLOOD PRESSURE: 62 MMHG | TEMPERATURE: 98.3 F | OXYGEN SATURATION: 97 % | BODY MASS INDEX: 22.26 KG/M2

## 2018-08-05 DIAGNOSIS — S05.01XA ABRASION OF RIGHT CORNEA, INITIAL ENCOUNTER: Primary | ICD-10-CM

## 2018-08-05 PROCEDURE — 99213 OFFICE O/P EST LOW 20 MIN: CPT | Performed by: PHYSICIAN ASSISTANT

## 2018-08-05 PROCEDURE — 90715 TDAP VACCINE 7 YRS/> IM: CPT

## 2018-08-05 PROCEDURE — G0463 HOSPITAL OUTPT CLINIC VISIT: HCPCS | Performed by: PHYSICIAN ASSISTANT

## 2018-08-05 RX ORDER — ERYTHROMYCIN 5 MG/G
0.5 OINTMENT OPHTHALMIC EVERY 6 HOURS SCHEDULED
Qty: 3.5 G | Refills: 0 | Status: SHIPPED | OUTPATIENT
Start: 2018-08-05 | End: 2018-08-12

## 2018-08-05 RX ORDER — PROPARACAINE HYDROCHLORIDE 5 MG/ML
1 SOLUTION/ DROPS OPHTHALMIC ONCE
Status: COMPLETED | OUTPATIENT
Start: 2018-08-05 | End: 2018-08-05

## 2018-08-05 RX ADMIN — PROPARACAINE HYDROCHLORIDE 1 DROP: 5 SOLUTION/ DROPS OPHTHALMIC at 11:16

## 2018-08-05 NOTE — PROGRESS NOTES
[facility]        NAME: Reyes Miser is a 79 y o  female  : 1950    MRN: [id number]  DATE: 2018  TIME: 11:12 AM    Assessment and Plan   Abrasion of right cornea, initial encounter [S05 01XA]  1  Abrasion of right cornea, initial encounter  TDAP Vaccine greater than or equal to 6yo    fluorescein sodium sterile (FLUOR-I-STRIPS A T ) ophthalmic strip    proparacaine (ALCAINE) 0 5 % ophthalmic solution 1 drop    erythromycin (ILOTYCIN) ophthalmic ointment         Patient Instructions     Use antibiotic ointment 4 times a day x 7 days  Follow up with opthalmology  TDAP updated  Follow up with PCP in 3-5 days  Proceed to  ER if symptoms worsen  Chief Complaint     Chief Complaint   Patient presents with    Eye Pain     she believes she scratched her right eye last night when she bumped her face on the back of her car  History of Present Illness       Eye Pain    The right eye is affected  This is a new problem  The current episode started yesterday  The problem occurs constantly  The problem has been unchanged  The injury mechanism was a direct trauma (Pt hit face on plastic trunk piece from epps back)  The pain is at a severity of 6/10  The pain is moderate  There is no known exposure to pink eye  She does not wear contacts  Associated symptoms include blurred vision, an eye discharge (clear tearing) and photophobia  Pertinent negatives include no double vision, fever, foreign body sensation, nausea, recent URI or vomiting  She has tried nothing for the symptoms  Review of Systems   Review of Systems   Constitutional: Negative for chills, fatigue and fever  HENT: Negative for congestion, ear pain, hearing loss, postnasal drip, sinus pain, sinus pressure and sore throat  Eyes: Positive for blurred vision, photophobia, pain and discharge (clear tearing)  Negative for double vision  Respiratory: Negative for chest tightness and shortness of breath  Cardiovascular: Negative for chest pain  Gastrointestinal: Negative for abdominal pain, constipation, nausea and vomiting  Genitourinary: Negative for difficulty urinating  Musculoskeletal: Negative for arthralgias and myalgias  Skin: Negative for rash  Neurological: Negative for dizziness and headaches  Psychiatric/Behavioral: Negative for behavioral problems           Current Medications       Current Outpatient Prescriptions:     albuterol (PROVENTIL HFA,VENTOLIN HFA) 90 mcg/act inhaler, Inhale 2 puffs every 6 (six) hours as needed for wheezing, Disp: , Rfl:     beclomethasone (QVAR) 40 MCG/ACT inhaler, Inhale 2 puffs 2 (two) times a day, Disp: , Rfl:     CARTIA  MG 24 hr capsule, TAKE 1 CAPSULE BY MOUTH DAILY, Disp: 90 capsule, Rfl: 3    Cholecalciferol (VITAMIN D3) 1000 UNITS CAPS, Take by mouth daily, Disp: , Rfl:     estradiol (ESTRACE) 1 mg tablet, Take 1 mg by mouth daily, Disp: , Rfl:     flecainide (TAMBOCOR) 100 mg tablet, TAKE 1 TABLET TWICE DAILY, Disp: 60 tablet, Rfl: 5    FLUoxetine (PROzac) 20 MG tablet, Take 40 mg by mouth daily  , Disp: , Rfl:     LORazepam (ATIVAN) 0 5 mg tablet, Take 0 5 mg by mouth every 6 (six) hours as needed for anxiety, Disp: , Rfl:     potassium chloride (K-DUR,KLOR-CON) 20 mEq tablet, Take 20 mEq by mouth daily  , Disp: , Rfl:     erythromycin (ILOTYCIN) ophthalmic ointment, Administer 0 5 inches to the right eye every 6 (six) hours for 7 days, Disp: 3 5 g, Rfl: 0    fluorescein sodium sterile (FLUOR-I-STRIPS A T ) ophthalmic strip, Administer 1 strip to the right eye once for 1 dose, Disp: 100 each, Rfl: 0    Current Facility-Administered Medications:     proparacaine (ALCAINE) 0 5 % ophthalmic solution 1 drop, 1 drop, Left Eye, Once, Jaxson Vang PA-C    Current Allergies     Allergies as of 08/05/2018 - Reviewed 08/05/2018   Allergen Reaction Noted    Bactrim [sulfamethoxazole-trimethoprim]  10/01/2016    Wellbutrin [bupropion] 10/01/2016            The following portions of the patient's history were reviewed and updated as appropriate: allergies, current medications, past family history, past medical history, past social history, past surgical history and problem list      Past Medical History:   Diagnosis Date    Anxiety     Asthma     [facility]     Bowel obstruction [facility]     Von Willebrand disease ([facility] )        Past Surgical History:   Procedure Laterality Date    BUNIONECTOMY      HEMORROIDECTOMY      HYSTERECTOMY      POLYPECTOMY         Family History   Problem Relation Age of Onset    Heart attack Father     Heart attack Sister          Medications have been verified  Objective   /62   Pulse 68   Temp 98 3 °F (36 8 °C) (Temporal)   Resp 18   Ht 5' 5" (1 651 m)   Wt 60 6 kg (133 lb 9 6 oz)   SpO2 97%   BMI 22 23 kg/m²        Physical Exam     Physical Exam   Constitutional: She is oriented to person, place, and time  She appears well-developed and well-nourished  HENT:   Right Ear: Tympanic membrane and external ear normal    Left Ear: Tympanic membrane and external ear normal    Eyes: EOM are normal  Pupils are equal, round, and reactive to light  Lids are everted and swept, no foreign bodies found  Right eye exhibits chemosis and discharge  Right eye exhibits no exudate and no hordeolum  No foreign body present in the right eye  Right conjunctiva is injected  Fluorescein stain showed corneal abrasion centrally 12 o'clock position  Neck: Normal range of motion  No edema present  Cardiovascular: Normal rate, regular rhythm, S1 normal, S2 normal and normal heart sounds  No murmur heard  Pulmonary/Chest: Effort normal and breath sounds normal  No respiratory distress  She has no wheezes  She has no rales  She exhibits no tenderness  Lymphadenopathy:     She has no cervical adenopathy  Neurological: She is alert and oriented to person, place, and time     Skin: Skin is warm, dry and intact  No rash noted  Psychiatric: She has a normal mood and affect  Her speech is normal and behavior is normal    Nursing note and vitals reviewed

## 2018-08-05 NOTE — PATIENT INSTRUCTIONS
Corneal Abrasion   WHAT YOU NEED TO KNOW:   A corneal abrasion is a scratch on the cornea of your eye  The cornea is the clear layer that covers the front of your eye  A small scratch may heal in 1 to 2 days  Deeper or larger scratches may take longer to heal         DISCHARGE INSTRUCTIONS:   Contact your healthcare provider if:   · Your eye pain or vision gets worse  · You have yellow or green drainage from your eye  · You have questions or concerns about your condition or care  Medicines:   · Medicines  may be given in the form of eyedrops or ointment to help prevent an eye infection  You may also be given eye drops to decrease pain  Ask how to take this medicine safely  · Take your medicine as directed  Contact your healthcare provider if you think your medicine is not helping or if you have side effects  Tell him or her if you are allergic to any medicine  Keep a list of the medicines, vitamins, and herbs you take  Include the amounts, and when and why you take them  Bring the list or the pill bottles to follow-up visits  Carry your medicine list with you in case of an emergency  Follow up with your healthcare provider as directed:  Write down your questions so you remember to ask them during your visits  Self-care:   · Do not touch or rub your eye  · Ask your healthcare provider when you can start your normal activities  · Ask your healthcare provider when you can wear your contact lenses  · Wear sunglasses in bright light until your eyes feel better  Help prevent corneal abrasions:   · Remove your contact lenses if your eyes feel dry or irritated  · Wash your hands if you need to touch your eyes or your face  · Trim your child's fingernails so he cannot scratch his eye  · Wear protective eyewear when you work with chemicals, wood, dust, or metal      · Wear protective eyewear when you play sports  · Do not wear your contacts for longer than you should       · Do not wear colored lenses or lenses with shapes on them  These lenses may cause eye damage and vision loss  · Do not wear glitter makeup  Glitter can easily get into your eyes and under contact lenses  · Do not sleep with your contacts in your eyes  © 2017 [address] [employer] Information is for End User's use only and may not be sold, redistributed or otherwise used for commercial purposes  All illustrations and images included in CareNotes® are the copyrighted property of [employer]  or [employer]  The above information is an  only  It is not intended as medical advice for individual conditions or treatments  Talk to your doctor, nurse or pharmacist before following any medical regimen to see if it is safe and effective for you

## 2018-09-13 NOTE — PROGRESS NOTES
Cardiology Outpatient Follow up Note    Soumya Galloway 79 y o  female MRN: [id number]    09/14/18          Assessment/Plan:    1  Paroxysmal atrial fibrillation/aflutter  30 day TTM 11/16 showed no recurrence of afib, but she had more issues with afib/flutter over summer 2017  She may be a candidate for Watchman device (ALBA closure device) due to her Lindalee Guarneri, which hematologist feels is a contraindication to long term anticoagulation, but I do not believe she will consent to be on oral anticoagulation even for 45 days  She did see Dr Kesha Gordon who did not recommend anticoagulation and aspirin only twice a week due to her bleeding from hemorrhoids and history of anemia  CHADS VASC is 2 given age and female gender  She did not tolerate pill in the pocket Propafenone due to side effects, started Flecainide daily in 8/17 to help prevent recurrences, which she is tolerating and which has been helping to reduce afib/flutter events, although she had episode of aflutter 7/18 which converted spontaneously to SR  She is also on Diltiazem 120 mg daily  Stress EKG negative for inducible ischemia 11/17 at slightly submaximal HR (81% MPHR)  I offered to send her to EP for evaluation for possible afib/flutter ablation, she would like to defer this for now  We had extensive discussion today regarding the fact that long term she is highly likely to continue to have recurrences  1  Paroxysmal atrial fibrillation (HCC)  POCT ECG   2  Von Willebrand disease ([address] )     3  Typical atrial flutter (HCC)         HPI: 79 y o  woman with a history of Von Willebrand disease who is here for follow up of paroxysmal atrial fibrillation  She was admitted to [place] [address] with bronchitis in 10/16  At that time she was noted to have paroxysmal afib, which she was not symptomatically aware of  She was started on Diltiazem for rate control  Beta blocker was avoided due to her bronchospasm with bronchitis/asthma   She was discharged on ASA 81 after discussions with Hematology due to her increased bleeding risk with von Willebrand's, and the plan was to consider event monitor after she recovered from bronchitis to see if she continued to have episodes of afib, as Hematology felt she should be taken off of aspirin if able in the future  Echo 10/16 showed normal LV size and function, EF 60%, no RWMA, grade II diastolic dysfunction, normal RV size and function  Atrial septum bows from left to right consistent with increased left atrial pressure  Trace MR  Trace TR      30 day TTM in 11-12/16 showed no afib, only sinus rhythm and sinus tachycardia with HR   She was having bruising with the aspirin, it was stopped in 12/16  In January 2017, she was diagnosed with colitis in the sigmoid colon after she had colonoscopy with Dr Amrik Guillen  She was diagnosed with strep throat and scarlet fever 5/4/17, at that time she was noted to be in afib with RVR,  bpm  She reports she had sudden onset palpitations, she felt her pulse, and it was irregular (no chest pain or shortness of breath, she felt a little foggy), she went to Urgent Care, was given Metoprolol 2 5 mg IV, and HR improved to 77 bpm  Her potassium was low  She saw Dr Carlos Dahl the following day, and was started on KCL 20 meq bid  I saw her urgently 7/13/17, when she woke up at 2:30 AM with indigestion and felt that her heartbeat was fast and irregular  She took her Cardizem at 2:30, and at 3:30 she took the Propafenone (4 pills), as well as a potassium and aspirin  She was still feeling she was in afib, but HR decreased to 60s  She went to Urgent Care, and had an EKG which showed aflutter, she was going to get IV metoprolol but wasn't given because her BP was too low at 108/72  EKG in office showed rate controlled aflutter, I sent her to ED, but by the time she was seen and had EKG she had converted back to SR  She believes the whole episode was about 12 hours long       In 7/17 - 8/17, she had three episodes of palpitations, she took Propafenone for two of the episodes, these two episodes lasted about an hour  She didn't like taking the Propafenone, as it made her have side effects - dizziness, fatigue, and taste changes  I switched her from Propafenone PRN to daily Flecainide in 8/17  Stress EKG 11/17 was negative for inducible ischemia after she exercised for 6 minutes and 9 seconds of the Mauricio protocol, although she achieved only 81% of MPHR  She reports she had colds/bronchitis in Winter 2017-18 without any recurrences, so she felt she did pretty well  In May 2018 she had some musculoskeletal pain/soreness of right shoulder/chest, worse when she lay down  She had GI testing which indicated small hiatal hernia  She has not had EGD  She was admitted in 7/18 for another episode of chest discomfort and palpitations, at urgent care she was in afib, but converted spontaneously back to SR prior to evaluation in [place] [address] ED  Troponins were negative  TSH was normal      She did see Dr Radha Yang, who did not recommend oral anticoagulation, and aspirin only twice a week, on daily aspirin she was noting bleeding with hemorrhoids  She reports she stopped taking aspirin in Fall 2018          Patient Active Problem List   Diagnosis    Paroxysmal atrial fibrillation (HCC)    Asthma attack    Bronchitis    Von Willebrand disease ([address] )    Hiatal hernia    Typical atrial flutter (HCC)       Allergies   Allergen Reactions    Bactrim [Sulfamethoxazole-Trimethoprim]     Wellbutrin [Bupropion]          Current Outpatient Prescriptions:     albuterol (PROVENTIL HFA,VENTOLIN HFA) 90 mcg/act inhaler, Inhale 2 puffs every 6 (six) hours as needed for wheezing, Disp: , Rfl:     beclomethasone (QVAR) 40 MCG/ACT inhaler, Inhale 2 puffs 2 (two) times a day, Disp: , Rfl:     CARTIA  MG 24 hr capsule, TAKE 1 CAPSULE BY MOUTH DAILY, Disp: 90 capsule, Rfl: 3    Cholecalciferol (VITAMIN D3) 1000 UNITS CAPS, Take by mouth daily, Disp: , Rfl:     estradiol (ESTRACE) 1 mg tablet, Take 1 mg by mouth daily, Disp: , Rfl:     flecainide (TAMBOCOR) 100 mg tablet, TAKE 1 TABLET TWICE DAILY, Disp: 60 tablet, Rfl: 5    FLUoxetine (PROzac) 20 MG tablet, Take 40 mg by mouth daily  , Disp: , Rfl:     LORazepam (ATIVAN) 0 5 mg tablet, Take 0 5 mg by mouth every 6 (six) hours as needed for anxiety, Disp: , Rfl:     potassium chloride (K-DUR,KLOR-CON) 20 mEq tablet, Take 20 mEq by mouth daily  , Disp: , Rfl:     Past Medical History:   Diagnosis Date    Anxiety     Asthma     Atrial fibrillation (HCC)     Bowel obstruction (HCC)     Von Willebrand disease ([facility] )        Family History   Problem Relation Age of Onset    Heart attack Father     Heart attack Sister        Past Surgical History:   Procedure Laterality Date    BUNIONECTOMY      HEMORROIDECTOMY      HYSTERECTOMY      POLYPECTOMY         Social History     Social History    Marital status: /Civil Union     Spouse name: N/A    Number of children: N/A    Years of education: N/A     Occupational History    Not on file  Social History Main Topics    Smoking status: Former Smoker     Types: Cigarettes, Pipe, Cigars     Quit date: 2011    Smokeless tobacco: Never Used      Comment: 1 pack/day for 5 years, and about 1/2 pack for 30 years    Alcohol use 8 4 oz/week     14 Glasses of wine per week    Drug use: No    Sexual activity: Not on file     Other Topics Concern    Not on file     Social History Narrative    No narrative on file       Review of Systems   Constitution: Negative for chills, decreased appetite, diaphoresis, fever, weakness, malaise/fatigue, night sweats, weight gain and weight loss  HENT: Negative for ear pain, hearing loss, hoarse voice, nosebleeds, sore throat and tinnitus  Eyes: Negative for blurred vision and pain  Cardiovascular: Negative    Negative for chest pain, claudication, cyanosis, dyspnea on exertion, irregular heartbeat, leg swelling, near-syncope, orthopnea, palpitations, paroxysmal nocturnal dyspnea and syncope  Respiratory: Negative for cough, hemoptysis, shortness of breath, sleep disturbances due to breathing, snoring, sputum production and wheezing  Hematologic/Lymphatic: Negative for adenopathy and bleeding problem  Does not bruise/bleed easily  Skin: Positive for rash  Negative for color change, dry skin, flushing, itching and poor wound healing  Musculoskeletal: Negative for arthritis, back pain, falls, joint pain, muscle cramps, muscle weakness, myalgias and neck pain  Gastrointestinal: Positive for diarrhea and heartburn  Negative for abdominal pain, constipation, dysphagia, hematemesis, hematochezia, melena, nausea and vomiting  Genitourinary: Negative for dysuria, frequency, hematuria, hesitancy, non-menstrual bleeding and urgency  Neurological: Negative for excessive daytime sleepiness, dizziness, focal weakness, headaches, light-headedness, loss of balance, numbness, paresthesias, tremors and vertigo  Psychiatric/Behavioral: Negative for altered mental status, depression and memory loss  The patient does not have insomnia and is not nervous/anxious  Allergic/Immunologic: Negative for environmental allergies and persistent infections  Vitals: /66 (BP Location: Right arm, Patient Position: Sitting, Cuff Size: Standard)   Pulse 61   Ht 5' 5" (1 651 m)   Wt 60 3 kg (133 lb)   BMI 22 13 kg/m²       Physical Exam:     GEN: Alert and oriented x 3, in no acute distress  Well appearing and well nourished  HEENT: Sclera anicteric, conjunctivae pink, mucous membranes moist  Oropharynx clear  NECK: Supple, no carotid bruits, no significant JVD  Trachea midline, no thyromegaly  HEART: Regular rhythm, normal S1 and S2, no murmurs, clicks, gallops or rubs  PMI nondisplaced, no thrills     LUNGS: Clear to auscultation bilaterally; no wheezes, rales, or rhonchi  No increased work of breathing or signs of respiratory distress  ABDOMEN: Soft, nontender, nondistended, normoactive bowel sounds  EXTREMITIES: Skin warm and well perfused, no clubbing, cyanosis, or edema  NEURO: No focal findings  Normal gait  Normal speech  Mood and affect normal    SKIN: Normal without suspicious lesions on exposed skin        Lab Results:       No results found for: HGBA1C  No results found for: CHOL  No results found for: HDL  No results found for: LDLCALC  No results found for: TRIG  No components found for: CHOLHDL

## 2018-09-14 ENCOUNTER — OFFICE VISIT (OUTPATIENT)
Dept: CARDIOLOGY CLINIC | Facility: CLINIC | Age: 68
End: 2018-09-14
Payer: COMMERCIAL

## 2018-09-14 VITALS
HEIGHT: 65 IN | SYSTOLIC BLOOD PRESSURE: 118 MMHG | BODY MASS INDEX: 22.16 KG/M2 | WEIGHT: 133 LBS | HEART RATE: 61 BPM | DIASTOLIC BLOOD PRESSURE: 66 MMHG

## 2018-09-14 DIAGNOSIS — I48.3 TYPICAL ATRIAL FLUTTER (HCC): ICD-10-CM

## 2018-09-14 DIAGNOSIS — D68.0 VON WILLEBRAND DISEASE (HCC): ICD-10-CM

## 2018-09-14 DIAGNOSIS — I48.0 PAROXYSMAL ATRIAL FIBRILLATION (HCC): Primary | ICD-10-CM

## 2018-09-14 PROCEDURE — 99215 OFFICE O/P EST HI 40 MIN: CPT | Performed by: INTERNAL MEDICINE

## 2018-09-14 PROCEDURE — 93000 ELECTROCARDIOGRAM COMPLETE: CPT | Performed by: INTERNAL MEDICINE

## 2018-10-19 RX ORDER — BECLOMETHASONE DIPROPIONATE HFA 80 UG/1
AEROSOL, METERED RESPIRATORY (INHALATION)
Refills: 3 | COMMUNITY
Start: 2018-09-14 | End: 2019-03-22

## 2018-10-24 ENCOUNTER — OFFICE VISIT (OUTPATIENT)
Dept: CARDIOLOGY CLINIC | Facility: CLINIC | Age: 68
End: 2018-10-24
Payer: COMMERCIAL

## 2018-10-24 VITALS
DIASTOLIC BLOOD PRESSURE: 80 MMHG | BODY MASS INDEX: 22.49 KG/M2 | HEART RATE: 70 BPM | HEIGHT: 65 IN | SYSTOLIC BLOOD PRESSURE: 144 MMHG | WEIGHT: 135 LBS

## 2018-10-24 DIAGNOSIS — D68.0 VON WILLEBRAND DISEASE (HCC): Primary | ICD-10-CM

## 2018-10-24 DIAGNOSIS — I48.0 PAROXYSMAL ATRIAL FIBRILLATION (HCC): ICD-10-CM

## 2018-10-24 DIAGNOSIS — I48.3 TYPICAL ATRIAL FLUTTER (HCC): ICD-10-CM

## 2018-10-24 PROCEDURE — 99215 OFFICE O/P EST HI 40 MIN: CPT | Performed by: INTERNAL MEDICINE

## 2018-10-24 PROCEDURE — 93000 ELECTROCARDIOGRAM COMPLETE: CPT | Performed by: INTERNAL MEDICINE

## 2018-10-24 RX ORDER — LOPERAMIDE HYDROCHLORIDE 2 MG/1
2 TABLET ORAL AS NEEDED
COMMUNITY

## 2018-10-24 RX ORDER — FERROUS SULFATE 325(65) MG
325 TABLET ORAL AS NEEDED
COMMUNITY

## 2018-10-24 NOTE — LETTER
October 24, 2018     Elie Funes MD  [address] [place] [address]    Patient: Arias Garrett   YOB: 1950   Date of Visit: 10/24/2018       Dear Dr Torrie Mcgraw: Thank you for referring Gisela Meyer to me for evaluation  Below are my notes for this consultation  If you have questions, please do not hesitate to call me  I look forward to following your patient along with you  Sincerely,        Cecil Vega MD        CC: MD Cecil Evans MD  10/24/2018 10:56 AM  Sign at close encounter  [address] [facility]    Outpatient New Consult    Today's Date: 10/24/18        Patient name: Arias Garrett  YOB: 1950  Sex: female         Chief Complaint: Referral from Dr Amy Winston for [facility]  [facility]      ASSESSMENT:  Problem List Items Addressed This Visit     Paroxysmal atrial fibrillation [facility])    Relevant Orders    POCT ECG    Typical atrial flutter [facility])    Relevant Orders    POCT ECG        78 yo female  1) Paroxysmal aflutter- Oct 2016 and July 2018 episodes lead to hospitalizations both were TYpical fltuter w RVR  She converts on her own  No afib recorded on EKG's I can see  She has had less frequent palpitations and symptoms on Flecainide 100mg bid and Cartia 120mg  Jailene Boggs Last episode triggered by pain from hiatal hernia  2) [id number] =2 (female, age) and on nothing because of Von Willebrands disease, easy brusing on even aspirin  3) Von Willebrands: SHe had had hysterectomy in past with appropriate factors transfused preop and no issue  H/o heavy menses in past and easy bruising on aspirin  PLAN:  1  She is not a Candidate for Watchman because [id number] must be 3 or greater and also cannot tolerate 45 days of anticoagualtion required as minimum   Plus since low burden of aflutter I think she is fairly low risk for stroke and perhaps procedure is greater risk than the flutter itself    2  Recurrent symptomatic flutter is likely to continue to recur  Recommend aflutter ablation without any anticoagulation since right sided  Low risk procedure just using venous access should be well tolerated w Marco Shafer  She can stay on flecainide long term which works better to prevent afib than flutter  THis should give her best chance to avoid stroke risks and blood thinners  3  Recommend she see Dr Declan Mariee from Hematology to discuss the ablation and if we can get recommendations for any prophylaxis treatment        Orders Placed This Encounter   Procedures    POCT ECG     There are no discontinued medications    HPI/Subjective:   78 yo female  1) Paroxysmal aflutter- Oct 2016 and July 2018 episodes lead to hospitalizations both were TYpical fltuter w RVR  She converts on her own  No afib recorded on EKG's I can see  She has had less frequent palpitations and symptoms on Flecainide 100mg bid and Cartia 120mg  Darron Mustafa Last episode triggered by pain from hiatal hernia  2) [id number] =2 (female, age) and on nothing because of Von Willebrands disease, easy brusing on even aspirin  3) Von Willebrands: SHe had had hysterectomy in past with appropriate factors transfused preop and no issue  H/o heavy menses in past and easy bruising on aspirin  She feels good  Occassional palpitations improved on flecainide  Last know flutter in July  Normal EF w/o valve disease on TTE>       Please note HPI is listed by problem with with update following it, it is copied again in the assessment above and reflects medical decision making as well  Complete 12 point ROS reviewed and otherwise non pertinent or negative except as per HPI pertinent positives in Cardiovascular and Respiratory emphasized   Please see paper chart for outpatient clinic patients where the patient completed the 12 point ROS survey  Past Medical History:   Diagnosis Date    Anxiety     Asthma     Atrial fibrillation (HCC)     Bowel obstruction (HCC)     Von Willebrand disease ([address] )        Allergies   Allergen Reactions    Bactrim [Sulfamethoxazole-Trimethoprim]     Wellbutrin [Bupropion]      I reviewed the Home Medication list and Allergies in the chart  Scheduled Meds:  Current Outpatient Prescriptions   Medication Sig Dispense Refill    albuterol (PROVENTIL HFA,VENTOLIN HFA) 90 mcg/act inhaler Inhale 2 puffs every 6 (six) hours as needed for wheezing      beclomethasone (QVAR) 40 MCG/ACT inhaler Inhale 2 puffs 2 (two) times a day      CARTIA  MG 24 hr capsule TAKE 1 CAPSULE BY MOUTH DAILY 90 capsule 3    Cholecalciferol (VITAMIN D3) 1000 UNITS CAPS Take by mouth daily      estradiol (ESTRACE) 1 mg tablet Take 1 mg by mouth daily      ferrous sulfate 325 (65 Fe) mg tablet Take 325 mg by mouth daily with breakfast      flecainide (TAMBOCOR) 100 mg tablet TAKE 1 TABLET TWICE DAILY 60 tablet 5    FLUoxetine (PROzac) 20 MG tablet Take 40 mg by mouth daily        loperamide (IMODIUM A-D) 2 MG tablet Take 2 mg by mouth as needed for diarrhea      LORazepam (ATIVAN) 0 5 mg tablet Take 0 5 mg by mouth every 6 (six) hours as needed for anxiety      potassium chloride (K-DUR,KLOR-CON) 20 mEq tablet Take 20 mEq by mouth daily        QVAR REDIHALER 80 MCG/ACT inhaler TK  2 INHALES PO BID  3     No current facility-administered medications for this visit  PRN Meds:         Family History   Problem Relation Age of Onset    Heart attack Father     Heart attack Sister        Social History     Social History    Marital status: /Civil Union     Spouse name: N/A    Number of children: N/A    Years of education: N/A     Occupational History    Not on file       Social History Main Topics    Smoking status: Former Smoker     Types: Cigarettes, Pipe, Cigars     Quit date: 2011    Smokeless tobacco: Never Used      Comment: 1 pack/day for 5 years, and about 1/2 pack for 30 years    Alcohol use 8 4 oz/week     14 Glasses of wine per week    Drug use: No    Sexual activity: Not on file     Other Topics Concern    Not on file     Social History Narrative    No narrative on file         OBJECTIVE:    /80 (BP Location: Right arm, Patient Position: Sitting, Cuff Size: Standard)   Pulse 70   Ht 5' 5" (1 651 m)   Wt 61 2 kg (135 lb)   BMI 22 47 kg/m²    Vitals:    10/24/18 0914   Weight: 61 2 kg (135 lb)     GEN: No acute distress, Alert and oriented, well appearing  HEENT:Head, neck, ears, oral pharynx: Mucus membranes moist, oral pharynx clear, nares clear  External ears normal  EYES: Pupils equal, sclera anicteric, midline, normal conjuctiva  NECK: No JVD, supple, no obvious masses or thryomegaly or goiter  CARDIOVASCULAR:  RRR, No murmur, rub, gallops S1,S2  LUNGS: Clear To auscultation bilaterally, normal effort, no rales, rhonchi, crackles  ABDOMEN:  nondistended,  without obvious organomegaly or ascites  EXTREMITIES/VASCULAR:  No edema  Radial pulses intact, pedal pulses difficult to palpate, warm an well perfused  PSYCH: Normal Affect, no overt suicidal ideation, linear speech pattern without evidence of psychosis  NEURO: Grossly intact, moving all extremiteis equal, face symmetric, alert and responsive, no obvious focal defecits  GAIT:  Ambulates normally without difficulty  HEME: No bleeding, bruising, petechia, purpura  SKIN: No significant rashes, warm, no diaphoresis or pallor       Lab Results:       LABS:      Chemistry        Component Value Date/Time     07/12/2018 1431    K 4 1 07/12/2018 1431     07/12/2018 1431    CO2 24 07/12/2018 1431    BUN 9 07/12/2018 1431    CREATININE 0 76 07/12/2018 1431        Component Value Date/Time    CALCIUM 9 6 07/12/2018 1431    ALKPHOS 103 03/16/2018 0934    AST 18 03/16/2018 0934    ALT 30 03/16/2018 0934 No results found for: CHOL  No results found for: HDL  No results found for: LDLCALC  No results found for: TRIG  No components found for: CHOLHDL    IMAGING: No results found  Cardiac testing:   Results for orders placed during the hospital encounter of 10/01/16   Echo complete with contrast if indicated    Narrative [address]  [facility], [address] [facility]  [phone]    Transthoracic Echocardiogram  2D, M-mode, Doppler, and Color Doppler    Study date:  02-Oct-2016    Patient: Maynor Ohara  MR number: [id number]  Account number: [de-identified]  : 1950  Age: 61 years  Gender: Female  Status: Inpatient  Location: Bedside  Height: 65 in  Weight: 134 lb  BP: 126/ 61 mmHg    Indications: Cardiomyopathy    Diagnoses: I42 9 - Cardiomyopathy, unspecified    Sonographer:  HENNA Brito  Primary Physician:  Tiffany Mallory MD  Referring Physician:  Tiffany Mallory MD  Group:  [id number]  Interpreting Physician:  Laura Amin MD    SUMMARY    LEFT VENTRICLE:  Systolic function was normal  Ejection fraction was estimated to be 60 %  There were no regional wall motion abnormalities  Features were consistent with a pseudonormal left ventricular filling pattern,  with concomitant abnormal relaxation and increased filling pressure (grade 2  diastolic dysfunction)  RIGHT VENTRICLE:  The size was normal   Systolic function was normal     ATRIAL SEPTUM:  The septum bows from left to right, consistent with increased left atrial  pressure  MITRAL VALVE:  There was trace regurgitation  TRICUSPID VALVE:  There was trace regurgitation  HISTORY: PRIOR HISTORY: Afib, Asthma    PROCEDURE: The procedure was performed at the bedside  This was a routine  study  The transthoracic approach was used  The study included complete 2D  imaging, M-mode, complete spectral Doppler, and color Doppler   The heart rate  was 80 bpm, at the start of the study  Images were obtained from the  parasternal, apical, subcostal, and suprasternal notch acoustic windows  Image  quality was adequate  LEFT VENTRICLE: Size was normal  Systolic function was normal  Ejection  fraction was estimated to be 60 %  There were no regional wall motion  abnormalities  Wall thickness was normal  DOPPLER: Features were consistent  with a pseudonormal left ventricular filling pattern, with concomitant abnormal  relaxation and increased filling pressure (grade 2 diastolic dysfunction)  RIGHT VENTRICLE: The size was normal  Systolic function was normal  Wall  thickness was normal     LEFT ATRIUM: Size was normal     ATRIAL SEPTUM: The septum bows from left to right, consistent with increased  left atrial pressure  RIGHT ATRIUM: Size was normal     MITRAL VALVE: Valve structure was normal  There was normal leaflet separation  DOPPLER: The transmitral velocity was within the normal range  There was no  evidence for stenosis  There was trace regurgitation  AORTIC VALVE: The valve was trileaflet  Leaflets exhibited normal thickness and  normal cuspal separation  DOPPLER: Transaortic velocity was within the normal  range  There was no evidence for stenosis  There was no significant  regurgitation  TRICUSPID VALVE: The valve structure was normal  There was normal leaflet  separation  DOPPLER: The transtricuspid velocity was within the normal range  There was no evidence for stenosis  There was trace regurgitation  PULMONIC VALVE: Leaflets exhibited normal thickness, no calcification, and  normal cuspal separation  DOPPLER: The transpulmonic velocity was within the  normal range  There was no significant regurgitation  PERICARDIUM: There was no pericardial effusion  The pericardium was normal in  appearance  AORTA: The root exhibited normal size  SYSTEMIC VEINS: IVC: The inferior vena cava was normal in size   Respirophasic  changes were normal     MEASUREMENT TABLES    OTHER ECHO MEASUREMENTS  (Reference normals)  Estimated CVP   5 mmHg   (--)    SYSTEM MEASUREMENT TABLES    2D  %FS: 30 3 %  Ao Diam: 2 47 cm  EDV(Teich): 101 1 ml  EF(Teich): 57 66 %  ESV(Teich): 42 8 ml  IVSd: 0 86 cm  LA Area: 15 54 cm2  LA Diam: 3 7 cm  LVEDV MOD A4C: 56 71 ml  LVEF MOD A4C: 68 55 %  LVESV MOD A4C: 17 84 ml  LVIDd: 4 68 cm  LVIDs: 3 26 cm  LVLd A4C: 7 15 cm  LVLs A4C: 5 89 cm  LVPWd: 0 82 cm  RA Area: 11 56 cm2  RVIDd: 3 02 cm  SV MOD A4C: 38 87 ml  SV(Teich): 58 3 ml    MM  TAPSE: 2 25 cm    PW  E': 0 05 m/s  E/E': 19 09  MV A Emeka: 0 84 m/s  MV Dec Traverse: 4 75 m/s2  MV DecT: 207 97 ms  MV E Emeka: 0 99 m/s  MV E/A Ratio: 1 18  MV PHT: 60 31 ms  MVA By PHT: 3 65 cm2    [employer] Accredited Echocardiography Laboratory    Prepared and electronically signed by    Mirna Pelletier MD  Signed 02-Oct-2016 15:47:16       No results found for this or any previous visit  No results found for this or any previous visit  No results found for this or any previous visit          I reviewed and interpreted the following LABS/EKG/TELE/IMAGING and below is summary of my interpretation (if data available):        Current EKG and Rhythm Strip: NSR first degree av block    Past EKGs and RHYTHM strip: July 2018 and Oct 2016 typical flutter w rvr no afib

## 2018-10-24 NOTE — PROGRESS NOTES
HEART AND VASCULAR  CARDIAC Ul  [address] [facility]    Outpatient New Consult    Today's Date: 10/24/18        Patient name: Alcon Young  YOB: 1950  Sex: female         Chief Complaint: Referral from Dr Tania Bustos for [address]  Marcella Valentine Disease      ASSESSMENT:  Problem List Items Addressed This Visit     Paroxysmal atrial fibrillation [facility])    Relevant Orders    POCT ECG    Typical atrial flutter [facility])    Relevant Orders    POCT ECG        80 yo female  1) Paroxysmal aflutter- Oct 2016 and July 2018 episodes lead to hospitalizations both were TYpical fltuter w RVR  She converts on her own  No afib recorded on EKG's I can see  She has had less frequent palpitations and symptoms on Flecainide 100mg bid and Cartia 120mg  Pastora Soulier Last episode triggered by pain from hiatal hernia  2) [id number] =2 (female, age) and on nothing because of Von Willebrands disease, easy brusing on even aspirin  3) Von Willebrands: SHe had had hysterectomy in past with appropriate factors transfused preop and no issue  H/o heavy menses in past and easy bruising on aspirin  PLAN:  1  She is not a Candidate for Watchman because [id number] must be 3 or greater and also cannot tolerate 45 days of anticoagualtion required as minimum  Plus since low burden of aflutter I think she is fairly low risk for stroke and perhaps procedure is greater risk than the flutter itself    2  Recurrent symptomatic flutter is likely to continue to recur  Recommend aflutter ablation without any anticoagulation since right sided  Low risk procedure just using venous access should be well tolerated w Marcella Valentine  She can stay on flecainide long term which works better to prevent afib than flutter  THis should give her best chance to avoid stroke risks and blood thinners    3  Recommend she see Dr Juli Tobias from Hematology to discuss the ablation and if we can get recommendations for any prophylaxis treatment        Orders Placed This Encounter   Procedures    POCT ECG     There are no discontinued medications    HPI/Subjective:   78 yo female  1) Paroxysmal aflutter- Oct 2016 and July 2018 episodes lead to hospitalizations both were TYpical fltuter w RVR  She converts on her own  No afib recorded on EKG's I can see  She has had less frequent palpitations and symptoms on Flecainide 100mg bid and Cartia 120mg  Marsha Lucy Last episode triggered by pain from hiatal hernia  2) [id number] =2 (female, age) and on nothing because of Von Willebrands disease, easy brusing on even aspirin  3) Von Willebrands: SHe had had hysterectomy in past with appropriate factors transfused preop and no issue  H/o heavy menses in past and easy bruising on aspirin  She feels good  Occassional palpitations improved on flecainide  Last know flutter in July  Normal EF w/o valve disease on TTE>       Please note HPI is listed by problem with with update following it, it is copied again in the assessment above and reflects medical decision making as well  Complete 12 point ROS reviewed and otherwise non pertinent or negative except as per HPI pertinent positives in Cardiovascular and Respiratory emphasized  Please see paper chart for outpatient clinic patients where the patient completed the 12 point ROS survey  Past Medical History:   Diagnosis Date    Anxiety     Asthma     Atrial fibrillation (HCC)     Bowel obstruction (HCC)     Von Willebrand disease ([facility] [address] )        Allergies   Allergen Reactions    Bactrim [Sulfamethoxazole-Trimethoprim]     Wellbutrin [Bupropion]      I reviewed the Home Medication list and Allergies in the chart     Scheduled Meds:  Current Outpatient Prescriptions   Medication Sig Dispense Refill    albuterol (PROVENTIL HFA,VENTOLIN HFA) 90 mcg/act inhaler Inhale 2 puffs every 6 (six) hours as needed for wheezing      beclomethasone (QVAR) 40 MCG/ACT inhaler Inhale 2 puffs 2 (two) times a day      CARTIA  MG 24 hr capsule TAKE 1 CAPSULE BY MOUTH DAILY 90 capsule 3    Cholecalciferol (VITAMIN D3) 1000 UNITS CAPS Take by mouth daily      estradiol (ESTRACE) 1 mg tablet Take 1 mg by mouth daily      ferrous sulfate 325 (65 Fe) mg tablet Take 325 mg by mouth daily with breakfast      flecainide (TAMBOCOR) 100 mg tablet TAKE 1 TABLET TWICE DAILY 60 tablet 5    FLUoxetine (PROzac) 20 MG tablet Take 40 mg by mouth daily        loperamide (IMODIUM A-D) 2 MG tablet Take 2 mg by mouth as needed for diarrhea      LORazepam (ATIVAN) 0 5 mg tablet Take 0 5 mg by mouth every 6 (six) hours as needed for anxiety      potassium chloride (K-DUR,KLOR-CON) 20 mEq tablet Take 20 mEq by mouth daily        QVAR REDIHALER 80 MCG/ACT inhaler TK  2 INHALES PO BID  3     No current facility-administered medications for this visit  PRN Meds:         Family History   Problem Relation Age of Onset    Heart attack Father     Heart attack Sister        Social History     Social History    Marital status: /Civil Union     Spouse name: N/A    Number of children: N/A    Years of education: N/A     Occupational History    Not on file       Social History Main Topics    Smoking status: Former Smoker     Types: Cigarettes, Pipe, Cigars     Quit date: 2011    Smokeless tobacco: Never Used      Comment: 1 pack/day for 5 years, and about 1/2 pack for 30 years    Alcohol use 8 4 oz/week     14 Glasses of wine per week    Drug use: No    Sexual activity: Not on file     Other Topics Concern    Not on file     Social History Narrative    No narrative on file         OBJECTIVE:    /80 (BP Location: Right arm, Patient Position: Sitting, Cuff Size: Standard)   Pulse 70   Ht 5' 5" (1 651 m)   Wt 61 2 kg (135 lb)   BMI 22 47 kg/m²   Vitals:    10/24/18 0914   Weight: 61 2 kg (135 lb)     GEN: No acute distress, Alert and oriented, well appearing  HEENT:Head, neck, ears, oral pharynx: Mucus membranes moist, oral pharynx clear, nares clear  External ears normal  EYES: Pupils equal, sclera anicteric, midline, normal conjuctiva  NECK: No JVD, supple, no obvious masses or thryomegaly or goiter  CARDIOVASCULAR:  RRR, No murmur, rub, gallops S1,S2  LUNGS: Clear To auscultation bilaterally, normal effort, no rales, rhonchi, crackles  ABDOMEN:  nondistended,  without obvious organomegaly or ascites  EXTREMITIES/VASCULAR:  No edema  Radial pulses intact, pedal pulses difficult to palpate, warm an well perfused  PSYCH: Normal Affect, no overt suicidal ideation, linear speech pattern without evidence of psychosis  NEURO: Grossly intact, moving all extremiteis equal, face symmetric, alert and responsive, no obvious focal defecits  GAIT:  Ambulates normally without difficulty  HEME: No bleeding, bruising, petechia, purpura  SKIN: No significant rashes, warm, no diaphoresis or pallor  Lab Results:       LABS:      Chemistry        Component Value Date/Time     07/12/2018 1431    K 4 1 07/12/2018 1431     07/12/2018 1431    CO2 24 07/12/2018 1431    BUN 9 07/12/2018 1431    CREATININE 0 76 07/12/2018 1431        Component Value Date/Time    CALCIUM 9 6 07/12/2018 1431    ALKPHOS 103 03/16/2018 0934    AST 18 03/16/2018 0934    ALT 30 03/16/2018 0934            No results found for: CHOL  No results found for: HDL  No results found for: LDLCALC  No results found for: TRIG  No components found for: CHOLHDL    IMAGING: No results found       Cardiac testing:   Results for orders placed during the hospital encounter of 10/01/16   Echo complete with contrast if indicated    Narrative [facility] [address]  [address]  [phone]    Transthoracic Echocardiogram  2D, M-mode, Doppler, and Color Doppler    Study date:  02-Oct-2016    Patient: Maynor Ohara  MR number: [id number]  Account number: [de-identified]  : 1950  Age: 61 years  Gender: Female  Status: Inpatient  Location: Bedside  Height: 65 in  Weight: 134 lb  BP: 126/ 61 mmHg    Indications: Cardiomyopathy    Diagnoses: I42 9 - Cardiomyopathy, unspecified    Sonographer:  HENNA Ferris  Primary Physician:  Reba Bullock MD  Referring Physician:  Reba Bullock MD  Group:  [facility]  Interpreting Physician:  Rose Smith MD    SUMMARY    LEFT VENTRICLE:  Systolic function was normal  Ejection fraction was estimated to be 60 %  There were no regional wall motion abnormalities  Features were consistent with a pseudonormal left ventricular filling pattern,  with concomitant abnormal relaxation and increased filling pressure (grade 2  diastolic dysfunction)  RIGHT VENTRICLE:  The size was normal   Systolic function was normal     ATRIAL SEPTUM:  The septum bows from left to right, consistent with increased left atrial  pressure  MITRAL VALVE:  There was trace regurgitation  TRICUSPID VALVE:  There was trace regurgitation  HISTORY: PRIOR HISTORY: Afib, Asthma    PROCEDURE: The procedure was performed at the bedside  This was a routine  study  The transthoracic approach was used  The study included complete 2D  imaging, M-mode, complete spectral Doppler, and color Doppler  The heart rate  was 80 bpm, at the start of the study  Images were obtained from the  parasternal, apical, subcostal, and suprasternal notch acoustic windows  Image  quality was adequate  LEFT VENTRICLE: Size was normal  Systolic function was normal  Ejection  fraction was estimated to be 60 %  There were no regional wall motion  abnormalities  Wall thickness was normal  DOPPLER: Features were consistent  with a pseudonormal left ventricular filling pattern, with concomitant abnormal  relaxation and increased filling pressure (grade 2 diastolic dysfunction)      RIGHT VENTRICLE: The size was normal  Systolic function was normal  Wall  thickness was normal     LEFT ATRIUM: Size was normal     ATRIAL SEPTUM: The septum bows from left to right, consistent with increased  left atrial pressure  RIGHT ATRIUM: Size was normal     MITRAL VALVE: Valve structure was normal  There was normal leaflet separation  DOPPLER: The transmitral velocity was within the normal range  There was no  evidence for stenosis  There was trace regurgitation  AORTIC VALVE: The valve was trileaflet  Leaflets exhibited normal thickness and  normal cuspal separation  DOPPLER: Transaortic velocity was within the normal  range  There was no evidence for stenosis  There was no significant  regurgitation  TRICUSPID VALVE: The valve structure was normal  There was normal leaflet  separation  DOPPLER: The transtricuspid velocity was within the normal range  There was no evidence for stenosis  There was trace regurgitation  PULMONIC VALVE: Leaflets exhibited normal thickness, no calcification, and  normal cuspal separation  DOPPLER: The transpulmonic velocity was within the  normal range  There was no significant regurgitation  PERICARDIUM: There was no pericardial effusion  The pericardium was normal in  appearance  AORTA: The root exhibited normal size  SYSTEMIC VEINS: IVC: The inferior vena cava was normal in size   Respirophasic  changes were normal     MEASUREMENT TABLES    OTHER ECHO MEASUREMENTS  (Reference normals)  Estimated CVP   5 mmHg   (--)    SYSTEM MEASUREMENT TABLES    2D  %FS: 30 3 %  Ao Diam: 2 47 cm  EDV(Teich): 101 1 ml  EF(Teich): 57 66 %  ESV(Teich): 42 8 ml  IVSd: 0 86 cm  LA Area: 15 54 cm2  LA Diam: 3 7 cm  LVEDV MOD A4C: 56 71 ml  LVEF MOD A4C: 68 55 %  LVESV MOD A4C: 17 84 ml  LVIDd: 4 68 cm  LVIDs: 3 26 cm  LVLd A4C: 7 15 cm  LVLs A4C: 5 89 cm  LVPWd: 0 82 cm  RA Area: 11 56 cm2  RVIDd: 3 02 cm  SV MOD A4C: 38 87 ml  SV(Teich): 58 3 ml    MM  TAPSE: 2 25 cm    PW  E': 0 05 m/s  E/E': 19 09  MV A Emeka: 0 84 m/s  MV Dec Baxter: 4 75 m/s2  MV DecT: 207 97 ms  MV E Emeka: 0 99 m/s  MV E/A Ratio: 1 18  MV PHT: 60 31 ms  MVA By PHT: 3 65 cm2    [address] Accredited Echocardiography [facility]    Prepared and electronically signed by    Pooja Funk MD  Signed 02-Oct-2016 15:47:16       No results found for this or any previous visit  No results found for this or any previous visit  No results found for this or any previous visit          I reviewed and interpreted the following LABS/EKG/TELE/IMAGING and below is summary of my interpretation (if data available):        Current EKG and Rhythm Strip: NSR first degree av block    Past EKGs and RHYTHM strip: July 2018 and Oct 2016 typical flutter w rvr no afib

## 2018-11-09 ENCOUNTER — TELEPHONE (OUTPATIENT)
Dept: HEMATOLOGY ONCOLOGY | Facility: CLINIC | Age: 68
End: 2018-11-09

## 2018-12-05 ENCOUNTER — OFFICE VISIT (OUTPATIENT)
Dept: HEMATOLOGY ONCOLOGY | Facility: CLINIC | Age: 68
End: 2018-12-05
Payer: COMMERCIAL

## 2018-12-05 VITALS
DIASTOLIC BLOOD PRESSURE: 68 MMHG | HEIGHT: 65 IN | BODY MASS INDEX: 22.69 KG/M2 | HEART RATE: 66 BPM | SYSTOLIC BLOOD PRESSURE: 110 MMHG | WEIGHT: 136.2 LBS | RESPIRATION RATE: 14 BRPM | TEMPERATURE: 97 F

## 2018-12-05 DIAGNOSIS — D68.0 VON WILLEBRAND DISEASE (HCC): Primary | ICD-10-CM

## 2018-12-05 PROCEDURE — 99214 OFFICE O/P EST MOD 30 MIN: CPT | Performed by: INTERNAL MEDICINE

## 2018-12-05 NOTE — LETTER
December 5, 2018     Chayo Morin MD  [address] [address]    Patient: Aminta Hashimoto   YOB: 1950   Date of Visit: 12/5/2018       Dear Dr Nichelle Robles: Thank you for referring Ebony Scott to me for evaluation  Below are my notes for this consultation  If you have questions, please do not hesitate to call me  I look forward to following your patient along with you           Sincerely,        Jace Ramírez MD        CC: MD Edson Vincent MD Donavan Falling, MD  12/5/2018  2:38 PM  Sign at close encounter  Hematology Outpatient Follow - Up Note  Aminta Hashimoto 79 y o  female MRN: @ Encounter: [id number]        Date:  12/5/2018        Assessment/ Plan:      history of von Willebrand's disease type 2A he does not respond to DDAVP   she has normal von Willebrand's antigen, multimers, factor 8 however ristocetin cofactor is reduced at 48%, and no correction after infusion of DDAVP    For minor surgeries the patient needs to be admitted to the hospital for Humate P    Now she needs a right-sided heart ablation for AF flutter, through a femoral vein access     Proceed with the procedure, no need for Humate P  I advised to apply pressure on the venous access for few hours to assess complete healing and then she can go home thereafter    The patient to be seen on as needed basis in the future or prior to surgical procedures           HPI: 45-year-old  female with history of von Willebrand's disease type IIa she was admitted to the hospital in August 2012 for small bowel obstruction, she was diagnosed previously with von Willebrand's disease type 2A, after she reported heavy menstrual cycles and easy bruisability, evaluated at [facility] she was told to receive Humate P prior to surgical procedures, she had a history of hysterectomy oophorectomies, hemorrhoidectomy without any complications of bleeding  In order for the patient to have colonoscopy she needs Humate P especially if biopsy is needed  In 2017 she was found to have intermittent atrial fibrillation followed by Cardiology, she was on aspirin 81 mg p o  3 times a week only given the history of von Willebrand's disease and the easy bruisability    Ristocetin cofactor 48% and no correction after infusion of DDAVP  She has normal multimers von Willebrand's disease, factor 8 and final brand antigen     Usually she receive she is he made PT 1500 international unit 30-60 minutes prior to colonoscopy or minor procedures and she will stay in the hospital as inpatient for Humate P 900 units every 12 hr x2 on 3 with premedication with Solu-Medrol 40 mg IV prior to her 1st dose of Humate P and Benadryl p r n  The patient was found to have a flutter, she is required to have right-sided ablation through a femoral vein access         ECOG score 0              Test Results:    Imaging: No results found  Labs:   Lab Results   Component Value Date    WBC 10 50 (H) 07/12/2018    HGB 14 0 07/12/2018    HCT 42 1 07/12/2018    MCV 97 07/12/2018     07/12/2018     Lab Results   Component Value Date    K 4 1 07/12/2018     07/12/2018    CO2 24 07/12/2018    BUN 9 07/12/2018    CREATININE 0 76 07/12/2018    GLUF 87 03/16/2018    CALCIUM 9 6 07/12/2018    AST 18 03/16/2018    ALT 30 03/16/2018    ALKPHOS 103 03/16/2018    EGFR 82 07/12/2018       Lab Results   Component Value Date    IRON 34 (L) 03/16/2018    FERRITIN 65 03/16/2018       Lab Results   Component Value Date    [id number] 748 02/02/2018         ROS:   Review of Systems   Constitutional: Negative for activity change, appetite change, diaphoresis, fatigue, fever and unexpected weight change  HENT: Negative for facial swelling, hearing loss, rhinorrhea, sinus pain, sinus pressure, sneezing, sore throat and tinnitus  Eyes: Negative for photophobia, pain, discharge, redness, itching and visual disturbance     Respiratory: Negative for apnea and chest tightness  Cardiovascular: Negative for chest pain, palpitations and leg swelling  Gastrointestinal: Negative for abdominal distention, abdominal pain, blood in stool, constipation, diarrhea, nausea, rectal pain and vomiting  Endocrine: Negative for cold intolerance, heat intolerance, polydipsia and polyphagia  Genitourinary: Negative for difficulty urinating, dyspareunia, frequency, hematuria, pelvic pain and urgency  Musculoskeletal: Negative for arthralgias, back pain, gait problem, joint swelling and myalgias  Skin: Negative for color change, pallor and rash  Allergic/Immunologic: Negative for environmental allergies and food allergies  Neurological: Negative for dizziness, tremors, seizures, syncope, speech difficulty, numbness and headaches  Hematological: Negative for adenopathy  Does not bruise/bleed easily  Psychiatric/Behavioral: Negative for agitation, confusion, dysphoric mood, hallucinations and suicidal ideas  Current Medications: Reviewed  Allergies: Reviewed  PMH/FH/SH:  Reviewed      Physical Exam:    Body surface area is 1 68 meters squared  Wt Readings from Last 3 Encounters:   12/05/18 61 8 kg (136 lb 3 2 oz)   10/24/18 61 2 kg (135 lb)   09/14/18 60 3 kg (133 lb)        Temp Readings from Last 3 Encounters:   12/05/18 (!) 97 °F (36 1 °C) (Tympanic)   08/05/18 98 3 °F (36 8 °C) (Temporal)   07/13/18 97 8 °F (36 6 °C) (Oral)        BP Readings from Last 3 Encounters:   12/05/18 110/68   10/24/18 144/80   09/14/18 118/66         Pulse Readings from Last 3 Encounters:   12/05/18 66   10/24/18 70   09/14/18 61        Physical Exam   Constitutional: She is oriented to person, place, and time  She appears well-developed and well-nourished  No distress  HENT:   Head: Normocephalic and atraumatic  Mouth/Throat: Oropharynx is clear and moist  No oropharyngeal exudate  Eyes: Pupils are equal, round, and reactive to light   Conjunctivae and EOM are normal  Neck: Normal range of motion  Neck supple  No tracheal deviation present  No thyromegaly present  Cardiovascular: Normal rate and regular rhythm  Exam reveals no gallop and no friction rub  No murmur heard  Pulmonary/Chest: Effort normal and breath sounds normal  No respiratory distress  She has no wheezes  She has no rales  She exhibits no tenderness  Abdominal: Soft  Bowel sounds are normal  She exhibits no distension and no mass  There is no tenderness  There is no rebound and no guarding  Musculoskeletal: Normal range of motion  Lymphadenopathy:     She has no cervical adenopathy  Neurological: She is alert and oriented to person, place, and time  Skin: Skin is warm and dry  No rash noted  She is not diaphoretic  No erythema  No pallor  Ecchymosis of the left upper extremity   Psychiatric: She has a normal mood and affect  Her behavior is normal  Judgment and thought content normal    Vitals reviewed  Goals and Barriers:  Current Goal: Minimize effects of disease  Barriers: None  Patient's Capacity to Self Care:  Patient is able to self care      Code Status: [unfilled]

## 2018-12-05 NOTE — PROGRESS NOTES
Hematology Outpatient Follow - Up Note  Cece Iverson 79 y o  female MRN: @ Encounter: [id number]        Date:  12/5/2018        Assessment/ Plan:      history of von Willebrand's disease type 2A he does not respond to DDAVP   she has normal von Willebrand's antigen, multimers, factor 8 however ristocetin cofactor is reduced at 48%, and no correction after infusion of DDAVP    For minor surgeries the patient needs to be admitted to the hospital for Humate P    Now she needs a right-sided heart ablation for AF flutter, through a femoral vein access     Proceed with the procedure, no need for Humate P  I advised to apply pressure on the venous access for few hours to assess complete healing and then she can go home thereafter    The patient to be seen on as needed basis in the future or prior to surgical procedures           HPI: 53-year-old  female with history of von Willebrand's disease type IIa she was admitted to the hospital in August 2012 for small bowel obstruction, she was diagnosed previously with von Willebrand's disease type 2A, after she reported heavy menstrual cycles and easy bruisability, evaluated at [facility] she was told to receive Humate P prior to surgical procedures, she had a history of hysterectomy oophorectomies, hemorrhoidectomy without any complications of bleeding  In order for the patient to have colonoscopy she needs Humate P especially if biopsy is needed  In 2017 she was found to have intermittent atrial fibrillation followed by Cardiology, she was on aspirin 81 mg p o  3 times a week only given the history of von Willebrand's disease and the easy bruisability    Ristocetin cofactor 48% and no correction after infusion of DDAVP  She has normal multimers von Willebrand's disease, factor 8 and final brand antigen     Usually she receive she is he made PT [employer] international unit 30-60 minutes prior to colonoscopy or minor procedures and she will stay in the hospital as inpatient for Humate P 900 units every 12 hr x2 on 3 with premedication with Solu-Medrol 40 mg IV prior to her 1st dose of Humate P and Benadryl p r n  The patient was found to have a flutter, she is required to have right-sided ablation through a femoral vein access         ECOG score 0              Test Results:    Imaging: No results found  Labs:   Lab Results   Component Value Date    WBC 10 50 (H) 07/12/2018    HGB 14 0 07/12/2018    HCT 42 1 07/12/2018    MCV 97 07/12/2018     07/12/2018     Lab Results   Component Value Date    K 4 1 07/12/2018     07/12/2018    CO2 24 07/12/2018    BUN 9 07/12/2018    CREATININE 0 76 07/12/2018    GLUF 87 03/16/2018    CALCIUM 9 6 07/12/2018    AST 18 03/16/2018    ALT 30 03/16/2018    ALKPHOS 103 03/16/2018    EGFR 82 07/12/2018       Lab Results   Component Value Date    IRON 34 (L) 03/16/2018    FERRITIN 65 03/16/2018       Lab Results   Component Value Date    [id number] 748 02/02/2018         ROS:   Review of Systems   Constitutional: Negative for activity change, appetite change, diaphoresis, fatigue, fever and unexpected weight change  HENT: Negative for facial swelling, hearing loss, rhinorrhea, sinus pain, sinus pressure, sneezing, sore throat and tinnitus  Eyes: Negative for photophobia, pain, discharge, redness, itching and visual disturbance  Respiratory: Negative for apnea and chest tightness  Cardiovascular: Negative for chest pain, palpitations and leg swelling  Gastrointestinal: Negative for abdominal distention, abdominal pain, blood in stool, constipation, diarrhea, nausea, rectal pain and vomiting  Endocrine: Negative for cold intolerance, heat intolerance, polydipsia and polyphagia  Genitourinary: Negative for difficulty urinating, dyspareunia, frequency, hematuria, pelvic pain and urgency  Musculoskeletal: Negative for arthralgias, back pain, gait problem, joint swelling and myalgias     Skin: Negative for color change, pallor and rash  Allergic/Immunologic: Negative for environmental allergies and food allergies  Neurological: Negative for dizziness, tremors, seizures, syncope, speech difficulty, numbness and headaches  Hematological: Negative for adenopathy  Does not bruise/bleed easily  Psychiatric/Behavioral: Negative for agitation, confusion, dysphoric mood, hallucinations and suicidal ideas  Current Medications: Reviewed  Allergies: Reviewed  PMH/FH/SH:  Reviewed      Physical Exam:    Body surface area is 1 68 meters squared  Wt Readings from Last 3 Encounters:   12/05/18 61 8 kg (136 lb 3 2 oz)   10/24/18 61 2 kg (135 lb)   09/14/18 60 3 kg (133 lb)        Temp Readings from Last 3 Encounters:   12/05/18 (!) 97 °F (36 1 °C) (Tympanic)   08/05/18 98 3 °F (36 8 °C) (Temporal)   07/13/18 97 8 °F (36 6 °C) (Oral)        BP Readings from Last 3 Encounters:   12/05/18 110/68   10/24/18 144/80   09/14/18 118/66         Pulse Readings from Last 3 Encounters:   12/05/18 66   10/24/18 70   09/14/18 61        Physical Exam   Constitutional: She is oriented to person, place, and time  She appears well-developed and well-nourished  No distress  HENT:   Head: Normocephalic and atraumatic  Mouth/Throat: Oropharynx is clear and moist  No oropharyngeal exudate  Eyes: Pupils are equal, round, and reactive to light  Conjunctivae and EOM are normal    Neck: Normal range of motion  Neck supple  No tracheal deviation present  No thyromegaly present  Cardiovascular: Normal rate and regular rhythm  Exam reveals no gallop and no friction rub  No murmur heard  Pulmonary/Chest: Effort normal and breath sounds normal  No respiratory distress  She has no wheezes  She has no rales  She exhibits no tenderness  Abdominal: Soft  Bowel sounds are normal  She exhibits no distension and no mass  There is no tenderness  There is no rebound and no guarding  Musculoskeletal: Normal range of motion     Lymphadenopathy: She has no cervical adenopathy  Neurological: She is alert and oriented to person, place, and time  Skin: Skin is warm and dry  No rash noted  She is not diaphoretic  No erythema  No pallor  Ecchymosis of the left upper extremity   Psychiatric: She has a normal mood and affect  Her behavior is normal  Judgment and thought content normal    Vitals reviewed  Goals and Barriers:  Current Goal: Minimize effects of disease  Barriers: None  Patient's Capacity to Self Care:  Patient is able to self care      Code Status: @[facility]@

## 2018-12-14 ENCOUNTER — TELEPHONE (OUTPATIENT)
Dept: CARDIOLOGY CLINIC | Facility: CLINIC | Age: 68
End: 2018-12-14

## 2018-12-14 DIAGNOSIS — I48.3 TYPICAL ATRIAL FLUTTER (HCC): Primary | ICD-10-CM

## 2018-12-14 NOTE — TELEPHONE ENCOUNTER
Patient called, she states she has thought more about ablation and has seen hematology, and that she would like to proceed with ablation after 1/4/19  I can let scheduling know if you provide information necessary to schedule  Thanks

## 2019-01-10 DIAGNOSIS — I48.0 PAROXYSMAL ATRIAL FIBRILLATION (HCC): ICD-10-CM

## 2019-01-10 RX ORDER — FLECAINIDE ACETATE 100 MG/1
TABLET ORAL
Qty: 60 TABLET | Refills: 11 | Status: ON HOLD | OUTPATIENT
Start: 2019-01-10 | End: 2019-02-21 | Stop reason: SDUPTHER

## 2019-01-24 ENCOUNTER — APPOINTMENT (OUTPATIENT)
Dept: LAB | Facility: HOSPITAL | Age: 69
End: 2019-01-24
Attending: INTERNAL MEDICINE
Payer: COMMERCIAL

## 2019-01-24 DIAGNOSIS — I48.3 TYPICAL ATRIAL FLUTTER (HCC): ICD-10-CM

## 2019-01-24 LAB
ALBUMIN SERPL BCP-MCNC: 3.7 G/DL (ref 3.5–5)
ALP SERPL-CCNC: 79 U/L (ref 46–116)
ALT SERPL W P-5'-P-CCNC: 28 U/L (ref 12–78)
ANION GAP SERPL CALCULATED.3IONS-SCNC: 8 MMOL/L (ref 4–13)
AST SERPL W P-5'-P-CCNC: 23 U/L (ref 5–45)
BASOPHILS # BLD AUTO: 0.08 THOUSANDS/ΜL (ref 0–0.1)
BASOPHILS NFR BLD AUTO: 1 % (ref 0–1)
BILIRUB SERPL-MCNC: 0.24 MG/DL (ref 0.2–1)
BUN SERPL-MCNC: 14 MG/DL (ref 5–25)
CALCIUM SERPL-MCNC: 8.8 MG/DL (ref 8.3–10.1)
CHLORIDE SERPL-SCNC: 102 MMOL/L (ref 100–108)
CO2 SERPL-SCNC: 25 MMOL/L (ref 21–32)
CREAT SERPL-MCNC: 0.83 MG/DL (ref 0.6–1.3)
EOSINOPHIL # BLD AUTO: 0.2 THOUSAND/ΜL (ref 0–0.61)
EOSINOPHIL NFR BLD AUTO: 3 % (ref 0–6)
ERYTHROCYTE [DISTWIDTH] IN BLOOD BY AUTOMATED COUNT: 13.1 % (ref 11.6–15.1)
GFR SERPL CREATININE-BSD FRML MDRD: 74 ML/MIN/1.73SQ M
GLUCOSE SERPL-MCNC: 81 MG/DL (ref 65–140)
HCT VFR BLD AUTO: 40.7 % (ref 34.8–46.1)
HGB BLD-MCNC: 13.6 G/DL (ref 11.5–15.4)
IMM GRANULOCYTES # BLD AUTO: 0.02 THOUSAND/UL (ref 0–0.2)
IMM GRANULOCYTES NFR BLD AUTO: 0 % (ref 0–2)
LYMPHOCYTES # BLD AUTO: 1.77 THOUSANDS/ΜL (ref 0.6–4.47)
LYMPHOCYTES NFR BLD AUTO: 23 % (ref 14–44)
MCH RBC QN AUTO: 32.9 PG (ref 26.8–34.3)
MCHC RBC AUTO-ENTMCNC: 33.4 G/DL (ref 31.4–37.4)
MCV RBC AUTO: 99 FL (ref 82–98)
MONOCYTES # BLD AUTO: 0.77 THOUSAND/ΜL (ref 0.17–1.22)
MONOCYTES NFR BLD AUTO: 10 % (ref 4–12)
NEUTROPHILS # BLD AUTO: 4.86 THOUSANDS/ΜL (ref 1.85–7.62)
NEUTS SEG NFR BLD AUTO: 63 % (ref 43–75)
NRBC BLD AUTO-RTO: 0 /100 WBCS
PLATELET # BLD AUTO: 316 THOUSANDS/UL (ref 149–390)
PMV BLD AUTO: 9.5 FL (ref 8.9–12.7)
POTASSIUM SERPL-SCNC: 3.7 MMOL/L (ref 3.5–5.3)
PROT SERPL-MCNC: 7.2 G/DL (ref 6.4–8.2)
RBC # BLD AUTO: 4.13 MILLION/UL (ref 3.81–5.12)
SODIUM SERPL-SCNC: 135 MMOL/L (ref 136–145)
WBC # BLD AUTO: 7.7 THOUSAND/UL (ref 4.31–10.16)

## 2019-01-24 PROCEDURE — 36415 COLL VENOUS BLD VENIPUNCTURE: CPT

## 2019-01-24 PROCEDURE — 85025 COMPLETE CBC W/AUTO DIFF WBC: CPT

## 2019-01-24 PROCEDURE — 80053 COMPREHEN METABOLIC PANEL: CPT

## 2019-02-21 ENCOUNTER — ANESTHESIA EVENT (OUTPATIENT)
Dept: SURGERY | Facility: HOSPITAL | Age: 69
End: 2019-02-21
Payer: COMMERCIAL

## 2019-02-21 ENCOUNTER — ANESTHESIA (OUTPATIENT)
Dept: SURGERY | Facility: HOSPITAL | Age: 69
End: 2019-02-21
Payer: COMMERCIAL

## 2019-02-21 ENCOUNTER — HOSPITAL ENCOUNTER (OUTPATIENT)
Dept: NON INVASIVE DIAGNOSTICS | Facility: HOSPITAL | Age: 69
Discharge: HOME | End: 2019-02-21
Attending: INTERNAL MEDICINE | Admitting: INTERNAL MEDICINE
Payer: COMMERCIAL

## 2019-02-21 VITALS
HEART RATE: 58 BPM | BODY MASS INDEX: 22.49 KG/M2 | TEMPERATURE: 98.5 F | HEIGHT: 65 IN | OXYGEN SATURATION: 95 % | WEIGHT: 135 LBS | DIASTOLIC BLOOD PRESSURE: 59 MMHG | RESPIRATION RATE: 18 BRPM | SYSTOLIC BLOOD PRESSURE: 117 MMHG

## 2019-02-21 DIAGNOSIS — I48.3 TYPICAL ATRIAL FLUTTER (HCC): ICD-10-CM

## 2019-02-21 DIAGNOSIS — I48.0 PAROXYSMAL ATRIAL FIBRILLATION (HCC): ICD-10-CM

## 2019-02-21 LAB
ANION GAP SERPL CALCULATED.3IONS-SCNC: 8 MMOL/L (ref 4–13)
ATRIAL RATE: 59 BPM
ATRIAL RATE: 66 BPM
BASOPHILS # BLD AUTO: 0.06 THOUSANDS/ΜL (ref 0–0.1)
BASOPHILS NFR BLD AUTO: 1 % (ref 0–1)
BUN SERPL-MCNC: 15 MG/DL (ref 5–25)
CALCIUM SERPL-MCNC: 8.4 MG/DL (ref 8.3–10.1)
CHLORIDE SERPL-SCNC: 105 MMOL/L (ref 100–108)
CO2 SERPL-SCNC: 23 MMOL/L (ref 21–32)
CREAT SERPL-MCNC: 0.83 MG/DL (ref 0.6–1.3)
EOSINOPHIL # BLD AUTO: 0.17 THOUSAND/ΜL (ref 0–0.61)
EOSINOPHIL NFR BLD AUTO: 3 % (ref 0–6)
ERYTHROCYTE [DISTWIDTH] IN BLOOD BY AUTOMATED COUNT: 14.1 % (ref 11.6–15.1)
GFR SERPL CREATININE-BSD FRML MDRD: 74 ML/MIN/1.73SQ M
GLUCOSE P FAST SERPL-MCNC: 87 MG/DL (ref 65–99)
GLUCOSE SERPL-MCNC: 87 MG/DL (ref 65–140)
HCT VFR BLD AUTO: 38.9 % (ref 34.8–46.1)
HGB BLD-MCNC: 12.7 G/DL (ref 11.5–15.4)
IMM GRANULOCYTES # BLD AUTO: 0.03 THOUSAND/UL (ref 0–0.2)
IMM GRANULOCYTES NFR BLD AUTO: 1 % (ref 0–2)
INR PPP: 0.93 (ref 0.86–1.17)
LYMPHOCYTES # BLD AUTO: 1.06 THOUSANDS/ΜL (ref 0.6–4.47)
LYMPHOCYTES NFR BLD AUTO: 17 % (ref 14–44)
MCH RBC QN AUTO: 32.7 PG (ref 26.8–34.3)
MCHC RBC AUTO-ENTMCNC: 32.6 G/DL (ref 31.4–37.4)
MCV RBC AUTO: 100 FL (ref 82–98)
MONOCYTES # BLD AUTO: 0.63 THOUSAND/ΜL (ref 0.17–1.22)
MONOCYTES NFR BLD AUTO: 10 % (ref 4–12)
NEUTROPHILS # BLD AUTO: 4.43 THOUSANDS/ΜL (ref 1.85–7.62)
NEUTS SEG NFR BLD AUTO: 68 % (ref 43–75)
NRBC BLD AUTO-RTO: 0 /100 WBCS
P AXIS: 57 DEGREES
P AXIS: 71 DEGREES
PLATELET # BLD AUTO: 302 THOUSANDS/UL (ref 149–390)
PMV BLD AUTO: 9.7 FL (ref 8.9–12.7)
POTASSIUM SERPL-SCNC: 3.7 MMOL/L (ref 3.5–5.3)
PR INTERVAL: 268 MS
PR INTERVAL: 378 MS
PROTHROMBIN TIME: 12.6 SECONDS (ref 11.8–14.2)
QRS AXIS: 37 DEGREES
QRS AXIS: 71 DEGREES
QRSD INTERVAL: 90 MS
QRSD INTERVAL: 92 MS
QT INTERVAL: 436 MS
QT INTERVAL: 490 MS
QTC INTERVAL: 457 MS
QTC INTERVAL: 485 MS
RBC # BLD AUTO: 3.88 MILLION/UL (ref 3.81–5.12)
SODIUM SERPL-SCNC: 136 MMOL/L (ref 136–145)
T WAVE AXIS: 33 DEGREES
T WAVE AXIS: 42 DEGREES
VENTRICULAR RATE: 59 BPM
VENTRICULAR RATE: 66 BPM
WBC # BLD AUTO: 6.38 THOUSAND/UL (ref 4.31–10.16)

## 2019-02-21 PROCEDURE — C1731 CATH, EP, 20 OR MORE ELEC: HCPCS | Performed by: INTERNAL MEDICINE

## 2019-02-21 PROCEDURE — 80048 BASIC METABOLIC PNL TOTAL CA: CPT | Performed by: PHYSICIAN ASSISTANT

## 2019-02-21 PROCEDURE — 93010 ELECTROCARDIOGRAM REPORT: CPT | Performed by: INTERNAL MEDICINE

## 2019-02-21 PROCEDURE — 93005 ELECTROCARDIOGRAM TRACING: CPT

## 2019-02-21 PROCEDURE — 85025 COMPLETE CBC W/AUTO DIFF WBC: CPT | Performed by: PHYSICIAN ASSISTANT

## 2019-02-21 PROCEDURE — 85610 PROTHROMBIN TIME: CPT | Performed by: PHYSICIAN ASSISTANT

## 2019-02-21 PROCEDURE — C1894 INTRO/SHEATH, NON-LASER: HCPCS | Performed by: INTERNAL MEDICINE

## 2019-02-21 PROCEDURE — 93613 INTRACARDIAC EPHYS 3D MAPG: CPT | Performed by: INTERNAL MEDICINE

## 2019-02-21 PROCEDURE — 93623 PRGRMD STIMJ&PACG IV RX NFS: CPT | Performed by: INTERNAL MEDICINE

## 2019-02-21 PROCEDURE — 93653 COMPRE EP EVAL TX SVT: CPT | Performed by: INTERNAL MEDICINE

## 2019-02-21 PROCEDURE — 93621 COMP EP EVL L PAC&REC C SINS: CPT | Performed by: INTERNAL MEDICINE

## 2019-02-21 PROCEDURE — C1760 CLOSURE DEV, VASC: HCPCS | Performed by: INTERNAL MEDICINE

## 2019-02-21 PROCEDURE — C2630 CATH, EP, COOL-TIP: HCPCS | Performed by: INTERNAL MEDICINE

## 2019-02-21 PROCEDURE — C1730 CATH, EP, 19 OR FEW ELECT: HCPCS | Performed by: INTERNAL MEDICINE

## 2019-02-21 RX ORDER — LIDOCAINE HYDROCHLORIDE 10 MG/ML
INJECTION, SOLUTION INFILTRATION; PERINEURAL CODE/TRAUMA/SEDATION MEDICATION
Status: COMPLETED | OUTPATIENT
Start: 2019-02-21 | End: 2019-02-21

## 2019-02-21 RX ORDER — SODIUM CHLORIDE 9 MG/ML
INJECTION, SOLUTION INTRAVENOUS CONTINUOUS PRN
Status: DISCONTINUED | OUTPATIENT
Start: 2019-02-21 | End: 2019-02-21 | Stop reason: SURG

## 2019-02-21 RX ORDER — MIDAZOLAM HYDROCHLORIDE 1 MG/ML
INJECTION INTRAMUSCULAR; INTRAVENOUS AS NEEDED
Status: DISCONTINUED | OUTPATIENT
Start: 2019-02-21 | End: 2019-02-21 | Stop reason: SURG

## 2019-02-21 RX ORDER — FLECAINIDE ACETATE 50 MG/1
50 TABLET ORAL 2 TIMES DAILY
Qty: 60 TABLET | Refills: 3 | Status: SHIPPED | OUTPATIENT
Start: 2019-02-21 | End: 2019-03-22 | Stop reason: SDUPTHER

## 2019-02-21 RX ORDER — PROPOFOL 10 MG/ML
INJECTION, EMULSION INTRAVENOUS CONTINUOUS PRN
Status: DISCONTINUED | OUTPATIENT
Start: 2019-02-21 | End: 2019-02-21 | Stop reason: SURG

## 2019-02-21 RX ORDER — PROPOFOL 10 MG/ML
INJECTION, EMULSION INTRAVENOUS AS NEEDED
Status: DISCONTINUED | OUTPATIENT
Start: 2019-02-21 | End: 2019-02-21 | Stop reason: SURG

## 2019-02-21 RX ORDER — FENTANYL CITRATE 50 UG/ML
INJECTION, SOLUTION INTRAMUSCULAR; INTRAVENOUS AS NEEDED
Status: DISCONTINUED | OUTPATIENT
Start: 2019-02-21 | End: 2019-02-21 | Stop reason: SURG

## 2019-02-21 RX ORDER — ADENOSINE 3 MG/ML
INJECTION INTRAVENOUS CODE/TRAUMA/SEDATION MEDICATION
Status: COMPLETED | OUTPATIENT
Start: 2019-02-21 | End: 2019-02-21

## 2019-02-21 RX ORDER — ACETAMINOPHEN 325 MG/1
650 TABLET ORAL EVERY 4 HOURS PRN
Status: DISCONTINUED | OUTPATIENT
Start: 2019-02-21 | End: 2019-02-21 | Stop reason: HOSPADM

## 2019-02-21 RX ADMIN — LIDOCAINE HYDROCHLORIDE 8 ML: 10 INJECTION, SOLUTION INFILTRATION; PERINEURAL at 10:52

## 2019-02-21 RX ADMIN — FENTANYL CITRATE 50 MCG: 50 INJECTION, SOLUTION INTRAMUSCULAR; INTRAVENOUS at 10:11

## 2019-02-21 RX ADMIN — ADENOSINE 18 MG: 3 INJECTION, SOLUTION INTRAVENOUS at 11:58

## 2019-02-21 RX ADMIN — MIDAZOLAM 2 MG: 1 INJECTION INTRAMUSCULAR; INTRAVENOUS at 10:32

## 2019-02-21 RX ADMIN — LIDOCAINE HYDROCHLORIDE 6 ML: 10 INJECTION, SOLUTION INFILTRATION; PERINEURAL at 10:52

## 2019-02-21 RX ADMIN — MIDAZOLAM 2 MG: 1 INJECTION INTRAMUSCULAR; INTRAVENOUS at 10:08

## 2019-02-21 RX ADMIN — PROPOFOL 25 MG: 10 INJECTION, EMULSION INTRAVENOUS at 11:24

## 2019-02-21 RX ADMIN — SODIUM CHLORIDE: 0.9 INJECTION, SOLUTION INTRAVENOUS at 12:16

## 2019-02-21 RX ADMIN — FENTANYL CITRATE 25 MCG: 50 INJECTION, SOLUTION INTRAMUSCULAR; INTRAVENOUS at 11:14

## 2019-02-21 RX ADMIN — PROPOFOL 25 MG: 10 INJECTION, EMULSION INTRAVENOUS at 11:19

## 2019-02-21 RX ADMIN — PROPOFOL 20 MG: 10 INJECTION, EMULSION INTRAVENOUS at 11:56

## 2019-02-21 RX ADMIN — PROPOFOL 50 MCG/KG/MIN: 10 INJECTION, EMULSION INTRAVENOUS at 10:10

## 2019-02-21 RX ADMIN — FENTANYL CITRATE 25 MCG: 50 INJECTION, SOLUTION INTRAMUSCULAR; INTRAVENOUS at 11:05

## 2019-02-21 RX ADMIN — SODIUM CHLORIDE: 0.9 INJECTION, SOLUTION INTRAVENOUS at 10:08

## 2019-02-21 NOTE — H&P
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 who presents for EPS and ablation  She is on no anticoagulation given her von Willebrand's disease with [id number] of 2     * this AM she is in NSR    * plan ablation with discharge home on flecainide post    * labs pending this AM       History of Present Illness   HPI:  Thelma Robbins is a 79y o  year old female with a history of symptomatic atrial flutter, von Willebrand's disease who presents under Dr Sarai Salas for EPS and flutter ablation  From Dr Bell Shows last office note:   "80 yo female  1) Paroxysmal aflutter- Oct 2016 and July 2018 episodes lead to hospitalizations both were TYpical fltuter w RVR  She converts on her own  No afib recorded on EKG's I can see  She has had less frequent palpitations and symptoms on Flecainide 100mg bid and Cartia 120mg  Wilkes Cuellar Last episode triggered by pain from hiatal hernia  2) [id number] =2 (female, age) and on nothing because of Von Willebrands disease, easy brusing on even aspirin  3) Von Willebrands: SHe had had hysterectomy in past with appropriate factors transfused preop and no issue  H/o heavy menses in past and easy bruising on aspirin             PLAN:  1  She is not a Candidate for Watchman because [id number] must be 3 or greater and also cannot tolerate 45 days of anticoagualtion required as minimum  Plus since low burden of aflutter I think she is fairly low risk for stroke and perhaps procedure is greater risk than the flutter itself     2  Recurrent symptomatic flutter is likely to continue to recur  Recommend aflutter ablation without any anticoagulation since right sided  Low risk procedure just using venous access should be well tolerated w Reese Mater   She can stay on flecainide long term which works better to prevent afib than flutter  THis should give her best chance to avoid stroke risks and blood thinners  3  Recommend she see Dr Annabelle Santillan from Hematology to discuss the ablation and if we can get recommendations for any prophylaxis treatment"    Since last seen by Dr Dilip Chandler no new changes have occurred with her medical regimen  She is NPO for planned procedure  Review of Systems  ROS as noted above, otherwise 12 point review of systems was performed and is negative         Historical Information   Past Medical History:   Diagnosis Date    Anxiety     Asthma     Atrial fibrillation (HCC)     Bowel obstruction (HCC)     Von Willebrand disease ([address] )      Past Surgical History:   Procedure Laterality Date    BUNIONECTOMY      HEMORROIDECTOMY      HYSTERECTOMY      POLYPECTOMY       Family History:   Family History   Problem Relation Age of Onset    Heart attack Father     Heart attack Sister        Social History   Social History     Substance and Sexual Activity   Alcohol Use Yes    Alcohol/week: 8 4 oz    Types: 14 Glasses of wine per week     Social History     Substance and Sexual Activity   Drug Use No     Social History     Tobacco Use   Smoking Status Former Smoker    Types: Cigarettes, Pipe, Cigars    Last attempt to quit:     Years since quittin 1   Smokeless Tobacco Never Used   Tobacco Comment    1 pack/day for 5 years, and about 1/2 pack for 30 years         Meds/Allergies   all medications and allergies reviewed  Home Medications:   Medications Prior to Admission   Medication    albuterol (PROVENTIL HFA,VENTOLIN HFA) 90 mcg/act inhaler    beclomethasone (QVAR) 40 MCG/ACT inhaler    CARTIA  MG 24 hr capsule    Cholecalciferol (VITAMIN D3) 1000 UNITS CAPS    estradiol (ESTRACE) 1 mg tablet    ferrous sulfate 325 (65 Fe) mg tablet    flecainide (TAMBOCOR) 100 mg tablet    FLUoxetine (PROzac) 20 MG tablet    loperamide (IMODIUM A-D) 2 MG tablet    LORazepam (ATIVAN) 0 5 mg tablet    potassium chloride (K-DUR,KLOR-CON) 20 mEq tablet    QVAR REDIHALER 80 MCG/ACT inhaler       Allergies   Allergen Reactions    Bactrim [Sulfamethoxazole-Trimethoprim]     Wellbutrin [Bupropion]        Objective   Vitals: Blood pressure 141/67, pulse 67, temperature 98 °F (36 7 °C), resp  rate 18, height 5' 5" (1 651 m), weight 61 2 kg (135 lb), SpO2 100 %  Orthostatic Blood Pressures      Most Recent Value   Blood Pressure  141/67 filed at 2019 [id number]   Patient Position - Orthostatic VS  Sitting filed at 2019 [id number]          No intake or output data in the 24 hours ending 19 0815    Invasive Devices          None          Physical Exam   Constitutional: She is oriented to person, place, and time  She appears well-developed and well-nourished  HENT:   Head: Normocephalic and atraumatic  Eyes: Pupils are equal, round, and reactive to light  EOM are normal    Neck: Normal range of motion  Neck supple  Cardiovascular: Normal rate and regular rhythm  Pulmonary/Chest: Effort normal and breath sounds normal    Abdominal: Soft  Bowel sounds are normal    Musculoskeletal: Normal range of motion  Neurological: She is alert and oriented to person, place, and time  Skin: Skin is warm and dry  Psychiatric: She has a normal mood and affect  Lab Results: I have personally reviewed pertinent lab results  Invalid input(s): LABGLOM              Imaging: I have personally reviewed pertinent reports      Results for orders placed during the hospital encounter of 10/01/16   Echo complete with contrast if indicated    Narrative [address]  [phone]    Transthoracic Echocardiogram  2D, M-mode, Doppler, and Color Doppler    Study date:  02-Oct-2016    Patient: Ruperto Alarcon  MR number: [id number]  Account number: [de-identified]  : 1950  Age: 61 years  Gender: Female  Status: Inpatient  Location: Bedside  Height: 65 in  Weight: 134 lb  BP: 126/ 61 mmHg    Indications: Cardiomyopathy    Diagnoses: I42 9 - Cardiomyopathy, unspecified    Sonographer:  HENNA Rodriguez  Primary Physician:  Lucy Bright MD  Referring Physician:  Lucy Bright MD  Group:  [facility]  Interpreting Physician:  Deb Reid MD    SUMMARY    LEFT VENTRICLE:  Systolic function was normal  Ejection fraction was estimated to be 60 %  There were no regional wall motion abnormalities  Features were consistent with a pseudonormal left ventricular filling pattern,  with concomitant abnormal relaxation and increased filling pressure (grade 2  diastolic dysfunction)  RIGHT VENTRICLE:  The size was normal   Systolic function was normal     ATRIAL SEPTUM:  The septum bows from left to right, consistent with increased left atrial  pressure  MITRAL VALVE:  There was trace regurgitation  TRICUSPID VALVE:  There was trace regurgitation  HISTORY: PRIOR HISTORY: Afib, Asthma    PROCEDURE: The procedure was performed at the bedside  This was a routine  study  The transthoracic approach was used  The study included complete 2D  imaging, M-mode, complete spectral Doppler, and color Doppler  The heart rate  was 80 bpm, at the start of the study  Images were obtained from the  parasternal, apical, subcostal, and suprasternal notch acoustic windows  Image  quality was adequate  LEFT VENTRICLE: Size was normal  Systolic function was normal  Ejection  fraction was estimated to be 60 %  There were no regional wall motion  abnormalities  Wall thickness was normal  DOPPLER: Features were consistent  with a pseudonormal left ventricular filling pattern, with concomitant abnormal  relaxation and increased filling pressure (grade 2 diastolic dysfunction)      RIGHT VENTRICLE: The size was normal  Systolic function was normal  Wall  thickness was normal     LEFT ATRIUM: Size was normal     ATRIAL SEPTUM: The septum bows from left to right, consistent with increased  left atrial pressure  RIGHT ATRIUM: Size was normal     MITRAL VALVE: Valve structure was normal  There was normal leaflet separation  DOPPLER: The transmitral velocity was within the normal range  There was no  evidence for stenosis  There was trace regurgitation  AORTIC VALVE: The valve was trileaflet  Leaflets exhibited normal thickness and  normal cuspal separation  DOPPLER: Transaortic velocity was within the normal  range  There was no evidence for stenosis  There was no significant  regurgitation  TRICUSPID VALVE: The valve structure was normal  There was normal leaflet  separation  DOPPLER: The transtricuspid velocity was within the normal range  There was no evidence for stenosis  There was trace regurgitation  PULMONIC VALVE: Leaflets exhibited normal thickness, no calcification, and  normal cuspal separation  DOPPLER: The transpulmonic velocity was within the  normal range  There was no significant regurgitation  PERICARDIUM: There was no pericardial effusion  The pericardium was normal in  appearance  AORTA: The root exhibited normal size  SYSTEMIC VEINS: IVC: The inferior vena cava was normal in size   Respirophasic  changes were normal     MEASUREMENT TABLES    OTHER ECHO MEASUREMENTS  (Reference normals)  Estimated CVP   5 mmHg   (--)    SYSTEM MEASUREMENT TABLES    2D  %FS: 30 3 %  Ao Diam: 2 47 cm  EDV(Teich): 101 1 ml  EF(Teich): 57 66 %  ESV(Teich): 42 8 ml  IVSd: 0 86 cm  LA Area: 15 54 cm2  LA Diam: 3 7 cm  LVEDV MOD A4C: 56 71 ml  LVEF MOD A4C: 68 55 %  LVESV MOD A4C: 17 84 ml  LVIDd: 4 68 cm  LVIDs: 3 26 cm  LVLd A4C: 7 15 cm  LVLs A4C: 5 89 cm  LVPWd: 0 82 cm  RA Area: 11 56 cm2  RVIDd: 3 02 cm  SV MOD A4C: 38 87 ml  SV(Teich): 58 3 ml    MM  TAPSE: 2 25 cm    PW  E': 0 05 m/s  E/E': 19 09  MV A Emeka: 0 84 m/s  MV Dec Bibb: 4 75 m/s2  MV DecT: 207 97 ms  MV E Emeka: 0 99 m/s  MV E/A Ratio: 1 18  MV PHT: 60 31 ms  MVA By PHT: 3 65 cm2    [facility] Accredited [facility]chocardi[facility]    Prepared and electronically signed by    Meryle Peacemaker, MD  Signed 02-Oct-2016 15:47:16         EKG (8-2224):         Code Status: Prior

## 2019-02-21 NOTE — DISCHARGE INSTRUCTIONS
Please STOP taking Cartia XT  Your flecainide dosing has been changed - please take flecainide 50mg twice daily - prescription sent over to [facility]  No heavy lifting or strenuous activity for one week  No soaking in a bath tub/hot tub/swimming pool for one week or until groin heals  If you notice ongoing bleeding, swelling, or firm lumps in groin near ablation incision, please contact Dr Marco A Roberts office - [phone]

## 2019-02-21 NOTE — ANESTHESIA PREPROCEDURE EVALUATION
Review of Systems/Medical History  Patient summary reviewed  Chart reviewed  History of anesthetic complications PONV    Cardiovascular  EKG reviewed, Exercise tolerance (METS): >4,  Dysrhythmias (paroxysmal a fib/flutter) , atrial flutter, No angina , No orthopnea, No PND,    Pulmonary  Asthma , well controlled/ stable Asthma type of rescue: daily inhaler,        GI/Hepatic     Hiatal hernia,        Negative  ROS        Endo/Other  Negative endo/other ROS      GYN       Hematology    Coagulation disorder (Not responsive to DDAVP  Responds to Humi P) von Willebrand disease,    Musculoskeletal  Negative musculoskeletal ROS        Neurology  Negative neurology ROS      Psychology   Anxiety,              Physical Exam    Airway    Mallampati score: II  TM Distance: >3 FB  Neck ROM: full     Dental       Cardiovascular  Rhythm: regular, Rate: normal,     Pulmonary  Pulmonary exam normal Breath sounds clear to auscultation,     Other Findings        Anesthesia Plan  ASA Score- 3     Anesthesia Type- IV sedation with anesthesia and general with ASA Monitors  Additional Monitors:   Airway Plan: ETT  Plan Factors-    Induction- intravenous  Postoperative Plan-     Informed Consent- Anesthetic plan and risks discussed with patient  I personally reviewed this patient with the CRNA  Discussed and agreed on the Anesthesia Plan with the CYNDI Todd

## 2019-03-05 NOTE — PROGRESS NOTES
Cardiology Outpatient Follow up Note    Rufina Gutierrez 79 y o  female MRN: [id number]    03/06/19          Assessment/Plan:    1  Paroxysmal atrial fibrillation/aflutter  30 day TTM 11/16 showed no recurrence of afib, but she had more issues with afib/flutter over summer 2017  She is not currently a candidate for Watchman device (ALBA closure device) because she cannot tolerated oral anticoagulation even for 45 days, and overall CHADS Vasc score low  She did see Dr Sapphire Colindres who did not recommend anticoagulation and aspirin only twice a week due to her bleeding from hemorrhoids and history of anemia  She did not tolerate pill in the pocket Propafenone due to side effects, started Flecainide daily in 8/17 to help prevent recurrences  Stress EKG negative for inducible ischemia 11/17 at slightly submaximal HR (81% MPHR)  She saw Dr Amanda Vanegas 10/18, he reviewed her EKGs and felt all were consistent with aflutter, not afib  He felt she would benefit symptomatically from aflutter ablation  Underwent CTI dependent aflutter ablation with Dr Amanda Vanegas 2/21/19  At discharge Diltiazem was stopped and flecainide dose was reduced to 50 bid  Follow up in 6 months to ensure remains asymptomatic, and then can be yearly  1  Typical atrial flutter (HCC)     2  Paroxysmal atrial fibrillation ([facility] [address] )     3  [facility]brand disease ([facility] )         HPI: 79 y o  woman with a history of Von Willebrand disease who is here for follow up of paroxysmal atrial fibrillation  She was admitted to [place] [id number] with bronchitis in 10/16  At that time she was noted to have paroxysmal afib, which she was not symptomatically aware of  She was started on Diltiazem for rate control  Beta blocker was avoided due to her bronchospasm with bronchitis/asthma   She was discharged on ASA 81 after discussions with Hematology due to her increased bleeding risk with von Willebrand's, and the plan was to consider event monitor after she recovered from bronchitis to see if she continued to have episodes of afib, as Hematology felt she should be taken off of aspirin if able in the future  Echo 10/16 showed normal LV size and function, EF 60%, no RWMA, grade II diastolic dysfunction, normal RV size and function  Atrial septum bows from left to right consistent with increased left atrial pressure  Trace MR  Trace TR      30 day TTM in 11-12/16 showed no afib, only sinus rhythm and sinus tachycardia with HR   She was having bruising with the aspirin, it was stopped in 12/16  In January 2017, she was diagnosed with colitis in the sigmoid colon after she had colonoscopy with Dr Patt Singer  She was diagnosed with strep throat and scarlet fever 5/4/17, at that time she was noted to be in afib with RVR,  bpm  She reports she had sudden onset palpitations, she felt her pulse, and it was irregular (no chest pain or shortness of breath, she felt a little foggy), she went to Urgent Care, was given Metoprolol 2 5 mg IV, and HR improved to 77 bpm  Her potassium was low  She saw Dr Denise Petty the following day, and was started on KCL 20 meq bid  I saw her urgently 7/13/17, when she woke up at 2:30 AM with indigestion and felt that her heartbeat was fast and irregular  She took her Cardizem at 2:30, and at 3:30 she took the Propafenone (4 pills), as well as a potassium and aspirin  She was still feeling she was in afib, but HR decreased to 60s  She went to Urgent Care, and had an EKG which showed aflutter, she was going to get IV metoprolol but wasn't given because her BP was too low at 108/72  EKG in office showed rate controlled aflutter, I sent her to ED, but by the time she was seen and had EKG she had converted back to SR  She believes the whole episode was about 12 hours long  In 7/17 - 8/17, she had three episodes of palpitations, she took Propafenone for two of the episodes, these two episodes lasted about an hour   She didn't like taking the Propafenone, as it made her have side effects - dizziness, fatigue, and taste changes  I switched her from Propafenone PRN to daily Flecainide in 8/17  Stress EKG 11/17 was negative for inducible ischemia after she exercised for 6 minutes and 9 seconds of the Mauricio protocol, although she achieved only 81% of MPHR  She reports she had colds/bronchitis in Winter 2017-18 without any recurrences, so she felt she did pretty well  In May 2018 she had some musculoskeletal pain/soreness of right shoulder/chest, worse when she lay down  She had GI testing which indicated small hiatal hernia  She has not had EGD  She was admitted in 7/18 for another episode of chest discomfort and palpitations, at urgent care she was in afib, but converted spontaneously back to SR prior to evaluation in [place] [address] ED  Troponins were negative  TSH was normal      She did see Dr Last Galdamez, who did not recommend oral anticoagulation, and aspirin only twice a week, on daily aspirin she was noting bleeding with hemorrhoids  She reports she stopped taking aspirin in Fall 2018  She saw Dr Bhakta Every 10/18, he reviewed her EKGs and felt all were consistent with aflutter, not afib  He felt she would benefit symptomatically from aflutter ablation  Underwent CTI dependent aflutter ablation with Dr Bhakta Every 2/21/19  At discharge Diltiazem was stopped and flecainide dose was reduced from 100 to 50 bid  She reports she is feeling better since ablation, she feels less tired  No CP  No SOB  She is not currently exercising formally  No orthopnea  Using up to 3 pillows due to reflux  No palpitations          Patient Active Problem List   Diagnosis    Paroxysmal atrial fibrillation (HCC)    Asthma attack    Bronchitis    Von [facility]brand disease ([facility] [address] )    Hiatal hernia    Typical atrial flutter (HCC)       Allergies   Allergen Reactions    Bactrim [Sulfamethoxazole-Trimethoprim]     Wellbutrin [Bupropion]          Current Outpatient Medications:    albuterol (PROVENTIL HFA,VENTOLIN HFA) 90 mcg/act inhaler, Inhale 2 puffs every 6 (six) hours as needed for wheezing, Disp: , Rfl:     beclomethasone (QVAR) 40 MCG/ACT inhaler, Inhale 2 puffs 2 (two) times a day, Disp: , Rfl:     Cholecalciferol (VITAMIN D3) 1000 UNITS CAPS, Take by mouth daily, Disp: , Rfl:     estradiol (ESTRACE) 1 mg tablet, Take 1 mg by mouth daily, Disp: , Rfl:     ferrous sulfate 325 (65 Fe) mg tablet, Take 325 mg by mouth daily with breakfast, Disp: , Rfl:     flecainide (TAMBOCOR) 50 mg tablet, Take 1 tablet (50 mg total) by mouth 2 (two) times a day, Disp: 60 tablet, Rfl: 3    FLUoxetine (PROzac) 20 MG tablet, Take 40 mg by mouth daily  , Disp: , Rfl:     loperamide (IMODIUM A-D) 2 MG tablet, Take 2 mg by mouth as needed for diarrhea, Disp: , Rfl:     LORazepam (ATIVAN) 0 5 mg tablet, Take 0 5 mg by mouth every 6 (six) hours as needed for anxiety, Disp: , Rfl:     potassium chloride (K-DUR,KLOR-CON) 20 mEq tablet, Take 20 mEq by mouth daily  , Disp: , Rfl:     QVAR REDIHALER 80 MCG/ACT inhaler, TK  2 INHALES PO BID, Disp: , Rfl: 3    Past Medical History:   Diagnosis Date    Anxiety     Asthma     Atrial fibrillation (HCC)     Bowel obstruction (HCC)     PAF (paroxysmal atrial fibrillation) (HCC)     Von Willebrand disease ([facility] [address] )        Family History   Problem Relation Age of Onset    Heart attack Father     Heart attack Sister        Past Surgical History:   Procedure Laterality Date    BUNIONECTOMY      HEMORROIDECTOMY      HYSTERECTOMY      POLYPECTOMY         Social History     Socioeconomic History    Marital status: /Civil Union     Spouse name: Not on file    Number of children: Not on file    Years of education: Not on file    Highest education level: Not on file   Occupational History    Not on file   Social Needs    Financial resource strain: Not on file    Food insecurity:     Worry: Not on file     Inability: Not on file   [employer] needs: Medical: Not on file     Non-medical: Not on file   Tobacco Use    Smoking status: Former Smoker     Types: Cigarettes, Pipe, Cigars     Last attempt to quit:      Years since quittin 1    Smokeless tobacco: Never Used    Tobacco comment: 1 pack/day for 5 years, and about 1/2 pack for 30 years   Substance and Sexual Activity    Alcohol use: Yes     Alcohol/week: 8 4 oz     Types: 14 Glasses of wine per week    Drug use: No    Sexual activity: Not on file   Lifestyle    Physical activity:     Days per week: Not on file     Minutes per session: Not on file    Stress: Not on file   Relationships    Social connections:     Talks on phone: Not on file     Gets together: Not on file     Attends [employer] service: Not on file     Active member of club or organization: Not on file     Attends meetings of clubs or organizations: Not on file     Relationship status: Not on file    Intimate partner violence:     Fear of current or ex partner: Not on file     Emotionally abused: Not on file     Physically abused: Not on file     Forced sexual activity: Not on file   Other Topics Concern    Not on file   Social History Narrative    Not on file       Review of Systems   Constitution: Negative for chills, decreased appetite, diaphoresis, fever, malaise/fatigue, night sweats, weight gain and weight loss  HENT: Negative for ear pain, hearing loss, hoarse voice, nosebleeds, sore throat and tinnitus  Eyes: Negative for blurred vision and pain  Cardiovascular: Negative  Negative for chest pain, claudication, cyanosis, dyspnea on exertion, irregular heartbeat, leg swelling, near-syncope, orthopnea, palpitations, paroxysmal nocturnal dyspnea and syncope  Respiratory: Negative for cough, hemoptysis, shortness of breath, sleep disturbances due to breathing, snoring, sputum production and wheezing  Hematologic/Lymphatic: Negative for adenopathy and bleeding problem  Does not bruise/bleed easily  Skin: Negative for color change, dry skin, flushing, itching, poor wound healing and rash  Musculoskeletal: Negative for arthritis, back pain, falls, joint pain, muscle cramps, muscle weakness, myalgias and neck pain  Gastrointestinal: Positive for diarrhea and heartburn  Negative for abdominal pain, constipation, dysphagia, hematemesis, hematochezia, melena, nausea and vomiting  Genitourinary: Negative for dysuria, frequency, hematuria, hesitancy, non-menstrual bleeding and urgency  Neurological: Negative for excessive daytime sleepiness, dizziness, focal weakness, headaches, light-headedness, loss of balance, numbness, paresthesias, tremors, vertigo and weakness  Psychiatric/Behavioral: Negative for altered mental status, depression and memory loss  The patient does not have insomnia and is not nervous/anxious  Allergic/Immunologic: Negative for environmental allergies and persistent infections  Vitals: /84 (BP Location: Left arm, Patient Position: Sitting, Cuff Size: Adult)   Pulse 74   Ht 5' 5" (1 651 m)   Wt 61 7 kg (136 lb)   SpO2 99%   BMI 22 63 kg/m²       Physical Exam:     GEN: Alert and oriented x 3, in no acute distress  Well appearing and well nourished  HEENT: Sclera anicteric, conjunctivae pink, mucous membranes moist  Oropharynx clear  NECK: Supple, no carotid bruits, no significant JVD  Trachea midline, no thyromegaly  HEART: Regular rhythm, normal S1 and S2, no murmurs, clicks, gallops or rubs  PMI nondisplaced, no thrills  LUNGS: Clear to auscultation bilaterally; no wheezes, rales, or rhonchi  No increased work of breathing or signs of respiratory distress  ABDOMEN: Soft, nontender, nondistended, normoactive bowel sounds  EXTREMITIES: Skin warm and well perfused, no clubbing, cyanosis, or edema  NEURO: No focal findings  Normal gait  Normal speech  Mood and affect normal    SKIN: Normal without suspicious lesions on exposed skin        Lab Results: No results found for: HGBA1C  No results found for: CHOL  No results found for: HDL  No results found for: LDLCALC  No results found for: TRIG  No results found for: CHOLHDL

## 2019-03-06 ENCOUNTER — OFFICE VISIT (OUTPATIENT)
Dept: CARDIOLOGY CLINIC | Facility: CLINIC | Age: 69
End: 2019-03-06
Payer: COMMERCIAL

## 2019-03-06 VITALS
HEIGHT: 65 IN | BODY MASS INDEX: 22.66 KG/M2 | HEART RATE: 74 BPM | WEIGHT: 136 LBS | OXYGEN SATURATION: 99 % | DIASTOLIC BLOOD PRESSURE: 84 MMHG | SYSTOLIC BLOOD PRESSURE: 146 MMHG

## 2019-03-06 VITALS
HEIGHT: 65 IN | WEIGHT: 136.3 LBS | SYSTOLIC BLOOD PRESSURE: 142 MMHG | HEART RATE: 70 BPM | DIASTOLIC BLOOD PRESSURE: 76 MMHG | BODY MASS INDEX: 22.71 KG/M2

## 2019-03-06 DIAGNOSIS — D68.0 VON WILLEBRAND DISEASE (HCC): ICD-10-CM

## 2019-03-06 DIAGNOSIS — I48.3 TYPICAL ATRIAL FLUTTER (HCC): Primary | ICD-10-CM

## 2019-03-06 DIAGNOSIS — I48.0 PAROXYSMAL ATRIAL FIBRILLATION (HCC): ICD-10-CM

## 2019-03-06 PROCEDURE — 99214 OFFICE O/P EST MOD 30 MIN: CPT | Performed by: PHYSICIAN ASSISTANT

## 2019-03-06 PROCEDURE — 99214 OFFICE O/P EST MOD 30 MIN: CPT | Performed by: INTERNAL MEDICINE

## 2019-03-06 PROCEDURE — 93000 ELECTROCARDIOGRAM COMPLETE: CPT | Performed by: PHYSICIAN ASSISTANT

## 2019-03-06 NOTE — PROGRESS NOTES
Cardiology Follow Up    Megan Garcia  1950  [id number]  HEART & VASCULAR [facility]  [facility] [place]  [address]        Assessment/Plan     1  Typical atrial flutter (HCC)  POCT ECG   2  Von Willebrand disease ([address] )         ASSESSMENT:  1  Paroxysmal atrial flutter status post typical cavotricuspid isthmus dependent atrial flutter ablation 2/21/2019   A ) maintained on flecainide antiarrhythmic therapy   B ) not on anticoagulation given history of von Willebrand disease (CHADS2 Vasc = 2)  2  First-degree AV block with poor AV lacy conduction and Wenckebach behavior noted during his ablation, however normal HV interval   3  Normal LV systolic function with EF 60% per echo 23/5409, grade 2 diastolic dysfunction noted  4  Nonischemic exercise stress test 11/2017  5  Von Willebrand disease with easy bruising    DISCUSSION/SUMMARY:  1  Paroxysmal atrial flutter, status post typical flutter line 2/21/2019 - she is maintaining normal sinus rhythm on low-dose flecainide  She has had no recurrent episodes  I did explain that there is about 40% of people who have atrial flutter later develop atrial fibrillation  I did explain the differences in these 2 rhythms, and the treatment approach for each  For now, she will continue low-dose flecainide as previously instructed  Now that she is not at risk to organize into a rapid atrial flutter, her diltiazem was discontinued  2  Prolonged SD interval, poor AV lacy conduction noted during procedure - her flecainide was lowered to 50 mg twice daily, and her diltiazem was discontinued  Her heart rate is 70 beats per minute today  This should continue to be monitored moving forward, she may eventually require pacemaker implantation  3  Von Willebrand disease - she is not on anticoagulation due to this, and follows up with her hematologist regularly      4  Elevated blood pressure - she states she has always had low blood pressure, and has not been diagnosed with hypertension  She states that more recently she has noticed more elevated readings, typically in the 140s  Today she is 142/76  I will defer to Dr Percy Fontanez for further management, as she is seeing her later today  If she does require an antihypertensive agent, would likely recommend Norvasc and avoid all AV lacy agents given her poor AV lacy conduction  She can follow up with us on an as-needed basis, but otherwise should continue to follow up with Dr Percy Fontanez for ongoing management while on flecainide  I asked the patient to contact us if she has any recurrent palpitations or arrhythmias  History of Present Illness     HPI/INTERVAL HISTORY: Aminta Hashimoto is a 79 y o  female with a history of paroxysmal atrial flutter, preserved LV systolic function, nonischemic exercise stress test, and von Willebrand's disease  She typically follows with Dr Percy Fontanez  She has had issues with paroxysmal atrial flutter for the past several years  She had been tried on pill in the pocket propafenone which she did not tolerate due to side effects  She was ultimately changed to flecainide along with diltiazem  Due to her ongoing episodes, she was seen in consultation by Dr Juanita Diaz and an atrial flutter ablation was recommended  She underwent a successful typical flutter ablation 2/21/2019, and was discharged home the same day without complications  She was not discharged on anticoagulation given her history of von Willebrand's disease and history of bleeding  She presents today for post ablation follow-up  She states she has been doing very well the procedure  She has noticed more energy, and feels as if she is sleeping better  She denies chest pain or pressure, shortness of breath, dizziness, lightheadedness, palpitations, or edema    She states that in the past she was aware of her arrhythmias, feeling palpitations, and irregular pulse, lightheadedness, and fogginess  Fortunately, she typically converts on her own and has not required cardioversions  At the time of her ablation, she was noted to have poor AV lacy conduction and Wenckebach pattern, however she had a normal HV interval   Thus her diltiazem was discontinued and her flecainide was reduced to 50 mg twice daily  She states her only concern since the procedure is noticing that her blood pressure is slightly more elevated  Review of Systems  ROS as noted above, otherwise 12 point review of systems was performed and is negative  Historical Information   Social History     Socioeconomic History    Marital status: /Civil Union     Spouse name: Not on file    Number of children: Not on file    Years of education: Not on file    Highest education level: Not on file   Occupational History    Not on file   Social Needs    Financial resource strain: Not on file    Food insecurity:     Worry: Not on file     Inability: Not on file    Transportation needs:     Medical: Not on file     Non-medical: Not on file   Tobacco Use    Smoking status: Former Smoker     Types: Cigarettes, Pipe, Cigars     Last attempt to quit:      Years since quittin 1    Smokeless tobacco: Never Used    Tobacco comment: 1 pack/day for 5 years, and about 1/2 pack for 30 years   Substance and Sexual Activity    Alcohol use:  Yes     Alcohol/week: 8 4 oz     Types: 14 Glasses of wine per week    Drug use: No    Sexual activity: Not on file   Lifestyle    Physical activity:     Days per week: Not on file     Minutes per session: Not on file    Stress: Not on file   Relationships    Social connections:     Talks on phone: Not on file     Gets together: Not on file     Attends Islam service: Not on file     Active member of club or organization: Not on file     Attends meetings of clubs or organizations: Not on file     Relationship status: Not on file    Intimate partner violence:     Fear of current or ex partner: Not on file     Emotionally abused: Not on file     Physically abused: Not on file     Forced sexual activity: Not on file   Other Topics Concern    Not on file   Social History Narrative    Not on file     Past Medical History:   Diagnosis Date    Anxiety     Asthma     Atrial fibrillation ([facility] )     Bowel obstruction ([facility] )     PAF (paroxysmal atrial fibrillation) ([place] [address] )     Von Willebrand disease ([place] [address] )      Past Surgical History:   Procedure Laterality Date    BUNIONECTOMY      HEMORROIDECTOMY      HYSTERECTOMY      POLYPECTOMY       Social History     Substance and Sexual Activity   Alcohol Use Yes    Alcohol/week: 8 4 oz    Types: 14 Glasses of wine per week     Social History     Substance and Sexual Activity   Drug Use No     Social History     Tobacco Use   Smoking Status Former Smoker    Types: Cigarettes, Pipe, Cigars    Last attempt to quit:     Years since quittin 1   Smokeless Tobacco Never Used   Tobacco Comment    1 pack/day for 5 years, and about 1/2 pack for 30 years     Family History   Problem Relation Age of Onset    Heart attack Father     Heart attack Sister        Meds/Allergies       Current Outpatient Medications:     albuterol (PROVENTIL HFA,VENTOLIN HFA) 90 mcg/act inhaler, Inhale 2 puffs every 6 (six) hours as needed for wheezing, Disp: , Rfl:     beclomethasone (QVAR) 40 MCG/ACT inhaler, Inhale 2 puffs 2 (two) times a day, Disp: , Rfl:     Cholecalciferol (VITAMIN D3) 1000 UNITS CAPS, Take by mouth daily, Disp: , Rfl:     estradiol (ESTRACE) 1 mg tablet, Take 1 mg by mouth daily, Disp: , Rfl:     ferrous sulfate 325 (65 Fe) mg tablet, Take 325 mg by mouth daily with breakfast, Disp: , Rfl:     flecainide (TAMBOCOR) 50 mg tablet, Take 1 tablet (50 mg total) by mouth 2 (two) times a day, Disp: 60 tablet, Rfl: 3    FLUoxetine (PROzac) 20 MG tablet, Take 40 mg by mouth daily  , Disp: , Rfl:     loperamide (IMODIUM A-D) 2 MG tablet, Take 2 mg by mouth as needed for diarrhea, Disp: , Rfl:     LORazepam (ATIVAN) 0 5 mg tablet, Take 0 5 mg by mouth every 6 (six) hours as needed for anxiety, Disp: , Rfl:     potassium chloride (K-DUR,KLOR-CON) 20 mEq tablet, Take 20 mEq by mouth daily  , Disp: , Rfl:     QVAR REDIHALER 80 MCG/ACT inhaler, TK  2 INHALES PO BID, Disp: , Rfl: 3    Allergies   Allergen Reactions    Bactrim [Sulfamethoxazole-Trimethoprim]     Wellbutrin [Bupropion]        Objective   Vitals: Blood pressure 142/76, pulse 70, height 5' 5" (1 651 m), weight 61 8 kg (136 lb 4 8 oz)          Physical Exam  GEN: NAD, alert and oriented, well appearing  SKIN: dry without significant lesions or rashes  HEENT: NCAT, PERRL, EOMs intact  NECK: No JVD appreciated  CARDIOVASCULAR: RRR, normal S1, S2 without murmurs, rubs, or gallops appreciated  LUNGS: Clear to auscultation bilaterally without wheezes, rhonchi, or rales  ABDOMEN: Soft, nontender, nondistended  EXTREMITIES/VASCULAR: perfused without clubbing, cyanosis, or edema b/l  PSYCH: Normal mood and affect  NEURO: CN ll-Xll grossly intact        Labs:  Admission on 02/21/2019, Discharged on 02/21/2019   Component Date Value    Sodium 02/21/2019 136     Potassium 02/21/2019 3 7     Chloride 02/21/2019 105     CO2 02/21/2019 23     ANION GAP 02/21/2019 8     BUN 02/21/2019 15     Creatinine 02/21/2019 0 83     Glucose 02/21/2019 87     Glucose, Fasting 02/21/2019 87     Calcium 02/21/2019 8 4     eGFR 02/21/2019 74     WBC 02/21/2019 6 38     RBC 02/21/2019 3 88     Hemoglobin 02/21/2019 12 7     Hematocrit 02/21/2019 38 9     MCV 02/21/2019 100*    MCH 02/21/2019 32 7     MCHC 02/21/2019 32 6     RDW 02/21/2019 14 1     MPV 02/21/2019 9 7     Platelets 44/81/9632 302     nRBC 02/21/2019 0     Neutrophils Relative 02/21/2019 68     Immat GRANS % 02/21/2019 1     Lymphocytes Relative 02/21/2019 17     Monocytes Relative 02/21/2019 10     Eosinophils Relative 02/21/2019 3  Basophils Relative 02/21/2019 1     Neutrophils Absolute 02/21/2019 4 43     Immature Grans Absolute 02/21/2019 0 03     Lymphocytes Absolute 02/21/2019 1 06     Monocytes Absolute 02/21/2019 0 63     Eosinophils Absolute 02/21/2019 0 17     Basophils Absolute 02/21/2019 0 06     Protime 02/21/2019 12 6     INR 02/21/2019 0 93     Ventricular Rate 02/21/2019 66     Atrial Rate 02/21/2019 66     NJ Interval 02/21/2019 268     QRSD Interval 02/21/2019 92     QT Interval 02/21/2019 436     QTC Interval 02/21/2019 457     P Axis 02/21/2019 57     QRS Axis 02/21/2019 37     T Wave Axis 02/21/2019 33     Ventricular Rate 02/21/2019 59     Atrial Rate 02/21/2019 59     NJ Interval 02/21/2019 378     QRSD Interval 02/21/2019 90     QT Interval 02/21/2019 490     QTC Interval 02/21/2019 485     P Axis 02/21/2019 71     QRS Axis 02/21/2019 71     T Wave [place] 02/21/2019 42    Appointment on 01/24/2019   Component Date Value    WBC 01/24/2019 7 70     RBC 01/24/2019 4 13     Hemoglobin 01/24/2019 13 6     Hematocrit 01/24/2019 40 7     MCV 01/24/2019 99*    MCH 01/24/2019 32 9     MCHC 01/24/2019 33 4     RDW 01/24/2019 13 1     MPV 01/24/2019 9 5     Platelets 85/66/2281 316     nRBC 01/24/2019 0     Neutrophils Relative 01/24/2019 63     Immat GRANS % 01/24/2019 0     Lymphocytes Relative 01/24/2019 23     Monocytes Relative 01/24/2019 10     Eosinophils Relative 01/24/2019 3     Basophils Relative 01/24/2019 1     Neutrophils Absolute 01/24/2019 4 86     Immature Grans Absolute 01/24/2019 0 02     Lymphocytes Absolute 01/24/2019 1 77     Monocytes Absolute 01/24/2019 0 77     Eosinophils Absolute 01/24/2019 0 20     Basophils Absolute 01/24/2019 0 08     Sodium 01/24/2019 135*    Potassium 01/24/2019 3 7     Chloride 01/24/2019 102     CO2 01/24/2019 25     ANION GAP 01/24/2019 8     BUN 01/24/2019 14     Creatinine 01/24/2019 0 83     Glucose 01/24/2019 81     Calcium 2019 8 8     AST 2019 23     ALT 2019 28     Alkaline Phosphatase 2019 79     Total Protein 2019 7 2     Albumin 2019 3 7     Total Bilirubin 2019 0 24     eGFR 2019 74        Imaging: I have personally reviewed pertinent reports  ECHO:   Results for orders placed during the hospital encounter of 10/01/16   Echo complete with contrast if indicated    Narrative [address]  [facility], [address] [facility]  [phone]    Transthoracic Echocardiogram  2D, M-mode, Doppler, and Color Doppler    Study date:  02-Oct-2016    Patient: Froilan Browning  MR number: [id number]  Account number: [de-identified]  : 1950  Age: 61 years  Gender: Female  Status: Inpatient  Location: Bedside  Height: 65 in  Weight: 134 lb  BP: 126/ 61 mmHg    Indications: Cardiomyopathy    Diagnoses: I42 9 - Cardiomyopathy, unspecified    Sonographer:  HENNA Morrow  Primary Physician:  Bisi Zurita MD  Referring Physician:  Bisi Zurita MD  Group:  [place] [id number] [facility]  Interpreting Physician:  Trinidad Stout MD    SUMMARY    LEFT VENTRICLE:  Systolic function was normal  Ejection fraction was estimated to be 60 %  There were no regional wall motion abnormalities  Features were consistent with a pseudonormal left ventricular filling pattern,  with concomitant abnormal relaxation and increased filling pressure (grade 2  diastolic dysfunction)  RIGHT VENTRICLE:  The size was normal   Systolic function was normal     ATRIAL SEPTUM:  The septum bows from left to right, consistent with increased left atrial  pressure  MITRAL VALVE:  There was trace regurgitation  TRICUSPID VALVE:  There was trace regurgitation  HISTORY: PRIOR HISTORY: Afib, Asthma    PROCEDURE: The procedure was performed at the bedside  This was a routine  study  The transthoracic approach was used   The study included complete 2D  imaging, M-mode, complete spectral Doppler, and color Doppler  The heart rate  was 80 bpm, at the start of the study  Images were obtained from the  parasternal, apical, subcostal, and suprasternal notch acoustic windows  Image  quality was adequate  LEFT VENTRICLE: Size was normal  Systolic function was normal  Ejection  fraction was estimated to be 60 %  There were no regional wall motion  abnormalities  Wall thickness was normal  DOPPLER: Features were consistent  with a pseudonormal left ventricular filling pattern, with concomitant abnormal  relaxation and increased filling pressure (grade 2 diastolic dysfunction)  RIGHT VENTRICLE: The size was normal  Systolic function was normal  Wall  thickness was normal     LEFT ATRIUM: Size was normal     ATRIAL SEPTUM: The septum bows from left to right, consistent with increased  left atrial pressure  RIGHT ATRIUM: Size was normal     MITRAL VALVE: Valve structure was normal  There was normal leaflet separation  DOPPLER: The transmitral velocity was within the normal range  There was no  evidence for stenosis  There was trace regurgitation  AORTIC VALVE: The valve was trileaflet  Leaflets exhibited normal thickness and  normal cuspal separation  DOPPLER: Transaortic velocity was within the normal  range  There was no evidence for stenosis  There was no significant  regurgitation  TRICUSPID VALVE: The valve structure was normal  There was normal leaflet  separation  DOPPLER: The transtricuspid velocity was within the normal range  There was no evidence for stenosis  There was trace regurgitation  PULMONIC VALVE: Leaflets exhibited normal thickness, no calcification, and  normal cuspal separation  DOPPLER: The transpulmonic velocity was within the  normal range  There was no significant regurgitation  PERICARDIUM: There was no pericardial effusion  The pericardium was normal in  appearance  AORTA: The root exhibited normal size      SYSTEMIC VEINS: IVC: The inferior vena cava was normal in size  Respirophasic  changes were normal     MEASUREMENT TABLES    OTHER ECHO MEASUREMENTS  (Reference normals)  Estimated CVP   5 mmHg   (--)    SYSTEM MEASUREMENT TABLES    2D  %FS: 30 3 %  Ao Diam: 2 47 cm  EDV(Teich): 101 1 ml  EF(Teich): 57 66 %  ESV(Teich): 42 8 ml  IVSd: 0 86 cm  LA Area: 15 54 cm2  LA Diam: 3 7 cm  LVEDV MOD A4C: 56 71 ml  LVEF MOD A4C: 68 55 %  LVESV MOD A4C: 17 84 ml  LVIDd: 4 68 cm  LVIDs: 3 26 cm  LVLd A4C: 7 15 cm  LVLs A4C: 5 89 cm  LVPWd: 0 82 cm  RA Area: 11 56 cm2  RVIDd: 3 02 cm  SV MOD A4C: 38 87 ml  SV(Teich): 58 3 ml    MM  TAPSE: 2 25 cm    PW  E': 0 05 m/s  E/E': 19 09  MV A Emeka: 0 84 m/s  MV Dec Ceiba: 4 75 m/s2  MV DecT: 207 97 ms  MV E Emeka: 0 99 m/s  MV E/A Ratio: 1 18  MV PHT: 60 31 ms  MVA By PHT: 3 65 cm2    [facility]redited [facility]    Prepared and electronically signed by    Meryle Peacemaker, MD  Signed 02-Oct-2016 15:47:16         EXERCISE STRESS TEST 11/2017: STRESS SUMMARY: pulse oxygenation pre stress test 98%  pulse oxygenation post stress test 100% Duration of exercise was 6 min and 9 sec  The patient exercised to protocol stage 3  Maximal work rate was 7 2 METs  Functional capacity was normal  Maximal heart rate during stress was 126 bpm ( 81  % of maximal predicted heart rate)  The heart rate response to stress was normal  There was normal resting blood pressure with an appropriate response to stress  The rate-pressure product for the peak heart rate and blood pressure was  [id number]  There was no chest pain during stress  The stress test was terminated due to moderate dyspnea  The stress ECG was negative for ischemia  There were no stress arrhythmias or conduction abnormalities  The sensitivity of this test was  limited by a failure to achieve target heart rate      SUMMARY:  -  Stress results: Duration of exercise was 6 min and 9 sec  Target heart rate was not achieved   There was no chest pain during stress  -  ECG conclusions: The stress ECG was negative for ischemia  The sensitivity of this test was limited by a failure to achieve target heart rate      IMPRESSIONS: Normal study after maximal exercise  Diagnostic sensitivity was limited by submaximal stress        EKG:  Normal sinus rhythm, heart rate 70 beats per minute, prolonged CO interval 232 milliseconds, normal QRS 84 milliseconds, normal axis

## 2019-03-22 ENCOUNTER — APPOINTMENT (EMERGENCY)
Dept: NON INVASIVE DIAGNOSTICS | Facility: HOSPITAL | Age: 69
End: 2019-03-22
Payer: COMMERCIAL

## 2019-03-22 ENCOUNTER — APPOINTMENT (EMERGENCY)
Dept: RADIOLOGY | Facility: HOSPITAL | Age: 69
End: 2019-03-22
Payer: COMMERCIAL

## 2019-03-22 ENCOUNTER — HOSPITAL ENCOUNTER (OUTPATIENT)
Facility: HOSPITAL | Age: 69
Setting detail: OBSERVATION
Discharge: HOME | End: 2019-03-22
Attending: EMERGENCY MEDICINE | Admitting: INTERNAL MEDICINE
Payer: COMMERCIAL

## 2019-03-22 VITALS
BODY MASS INDEX: 22.66 KG/M2 | TEMPERATURE: 97.7 F | DIASTOLIC BLOOD PRESSURE: 67 MMHG | HEIGHT: 65 IN | HEART RATE: 77 BPM | SYSTOLIC BLOOD PRESSURE: 133 MMHG | OXYGEN SATURATION: 98 % | RESPIRATION RATE: 17 BRPM | WEIGHT: 136 LBS

## 2019-03-22 DIAGNOSIS — R00.2 PALPITATIONS: ICD-10-CM

## 2019-03-22 DIAGNOSIS — I48.3 TYPICAL ATRIAL FLUTTER (HCC): ICD-10-CM

## 2019-03-22 DIAGNOSIS — I48.0 PAROXYSMAL ATRIAL FIBRILLATION (HCC): Primary | ICD-10-CM

## 2019-03-22 DIAGNOSIS — K29.00 ACUTE GASTRITIS WITHOUT HEMORRHAGE, UNSPECIFIED GASTRITIS TYPE: ICD-10-CM

## 2019-03-22 DIAGNOSIS — I31.3 PERICARDIAL EFFUSION: ICD-10-CM

## 2019-03-22 PROBLEM — K58.9 IRRITABLE BOWEL DISEASE: Status: ACTIVE | Noted: 2019-03-22

## 2019-03-22 LAB
ALBUMIN SERPL BCP-MCNC: 3.8 G/DL (ref 3.5–5)
ALP SERPL-CCNC: 83 U/L (ref 46–116)
ALT SERPL W P-5'-P-CCNC: 30 U/L (ref 12–78)
ANION GAP SERPL CALCULATED.3IONS-SCNC: 9 MMOL/L (ref 4–13)
AST SERPL W P-5'-P-CCNC: 28 U/L (ref 5–45)
ATRIAL RATE: 300 BPM
ATRIAL RATE: 76 BPM
BASOPHILS # BLD AUTO: 0.07 THOUSANDS/ΜL (ref 0–0.1)
BASOPHILS NFR BLD AUTO: 1 % (ref 0–1)
BILIRUB SERPL-MCNC: 0.49 MG/DL (ref 0.2–1)
BILIRUB UR QL STRIP: NEGATIVE
BUN SERPL-MCNC: 19 MG/DL (ref 5–25)
CALCIUM SERPL-MCNC: 9.2 MG/DL (ref 8.3–10.1)
CHLORIDE SERPL-SCNC: 101 MMOL/L (ref 100–108)
CLARITY UR: NORMAL
CO2 SERPL-SCNC: 24 MMOL/L (ref 21–32)
COLOR UR: YELLOW
CREAT SERPL-MCNC: 0.91 MG/DL (ref 0.6–1.3)
DEPRECATED D DIMER PPP: 704 NG/ML (FEU)
EOSINOPHIL # BLD AUTO: 0.17 THOUSAND/ΜL (ref 0–0.61)
EOSINOPHIL NFR BLD AUTO: 2 % (ref 0–6)
ERYTHROCYTE [DISTWIDTH] IN BLOOD BY AUTOMATED COUNT: 13.1 % (ref 11.6–15.1)
GFR SERPL CREATININE-BSD FRML MDRD: 66 ML/MIN/1.73SQ M
GLUCOSE SERPL-MCNC: 132 MG/DL (ref 65–140)
GLUCOSE UR STRIP-MCNC: NEGATIVE MG/DL
HCT VFR BLD AUTO: 45 % (ref 34.8–46.1)
HGB BLD-MCNC: 15.4 G/DL (ref 11.5–15.4)
HGB UR QL STRIP.AUTO: NEGATIVE
IMM GRANULOCYTES # BLD AUTO: 0.03 THOUSAND/UL (ref 0–0.2)
IMM GRANULOCYTES NFR BLD AUTO: 0 % (ref 0–2)
KETONES UR STRIP-MCNC: NEGATIVE MG/DL
LEUKOCYTE ESTERASE UR QL STRIP: NEGATIVE
LYMPHOCYTES # BLD AUTO: 1.61 THOUSANDS/ΜL (ref 0.6–4.47)
LYMPHOCYTES NFR BLD AUTO: 17 % (ref 14–44)
MAGNESIUM SERPL-MCNC: 2.5 MG/DL (ref 1.6–2.6)
MCH RBC QN AUTO: 32.6 PG (ref 26.8–34.3)
MCHC RBC AUTO-ENTMCNC: 34.2 G/DL (ref 31.4–37.4)
MCV RBC AUTO: 95 FL (ref 82–98)
MONOCYTES # BLD AUTO: 0.98 THOUSAND/ΜL (ref 0.17–1.22)
MONOCYTES NFR BLD AUTO: 10 % (ref 4–12)
NEUTROPHILS # BLD AUTO: 6.9 THOUSANDS/ΜL (ref 1.85–7.62)
NEUTS SEG NFR BLD AUTO: 70 % (ref 43–75)
NITRITE UR QL STRIP: NEGATIVE
NRBC BLD AUTO-RTO: 0 /100 WBCS
P AXIS: -86 DEGREES
P AXIS: 78 DEGREES
PH UR STRIP.AUTO: 6 [PH]
PLATELET # BLD AUTO: 314 THOUSANDS/UL (ref 149–390)
PMV BLD AUTO: 10.4 FL (ref 8.9–12.7)
POTASSIUM SERPL-SCNC: 3.6 MMOL/L (ref 3.5–5.3)
PR INTERVAL: 214 MS
PROT SERPL-MCNC: 8.1 G/DL (ref 6.4–8.2)
PROT UR STRIP-MCNC: NEGATIVE MG/DL
QRS AXIS: 94 DEGREES
QRS AXIS: 99 DEGREES
QRSD INTERVAL: 86 MS
QRSD INTERVAL: 88 MS
QT INTERVAL: 358 MS
QT INTERVAL: 358 MS
QTC INTERVAL: 402 MS
QTC INTERVAL: 479 MS
RBC # BLD AUTO: 4.72 MILLION/UL (ref 3.81–5.12)
SODIUM SERPL-SCNC: 134 MMOL/L (ref 136–145)
SP GR UR STRIP.AUTO: 1.02 (ref 1–1.03)
T WAVE AXIS: 35 DEGREES
T WAVE AXIS: 61 DEGREES
TROPONIN I SERPL-MCNC: 0.02 NG/ML
TROPONIN I SERPL-MCNC: <0.02 NG/ML
UROBILINOGEN UR QL STRIP.AUTO: 0.2 E.U./DL
VENTRICULAR RATE: 108 BPM
VENTRICULAR RATE: 76 BPM
WBC # BLD AUTO: 9.76 THOUSAND/UL (ref 4.31–10.16)

## 2019-03-22 PROCEDURE — 71275 CT ANGIOGRAPHY CHEST: CPT

## 2019-03-22 PROCEDURE — 93306 TTE W/DOPPLER COMPLETE: CPT

## 2019-03-22 PROCEDURE — 96374 THER/PROPH/DIAG INJ IV PUSH: CPT

## 2019-03-22 PROCEDURE — 84484 ASSAY OF TROPONIN QUANT: CPT | Performed by: INTERNAL MEDICINE

## 2019-03-22 PROCEDURE — 96361 HYDRATE IV INFUSION ADD-ON: CPT

## 2019-03-22 PROCEDURE — 74177 CT ABD & PELVIS W/CONTRAST: CPT

## 2019-03-22 PROCEDURE — 85379 FIBRIN DEGRADATION QUANT: CPT | Performed by: EMERGENCY MEDICINE

## 2019-03-22 PROCEDURE — 36415 COLL VENOUS BLD VENIPUNCTURE: CPT

## 2019-03-22 PROCEDURE — 99214 OFFICE O/P EST MOD 30 MIN: CPT | Performed by: PHYSICIAN ASSISTANT

## 2019-03-22 PROCEDURE — 84484 ASSAY OF TROPONIN QUANT: CPT | Performed by: EMERGENCY MEDICINE

## 2019-03-22 PROCEDURE — 80053 COMPREHEN METABOLIC PANEL: CPT | Performed by: EMERGENCY MEDICINE

## 2019-03-22 PROCEDURE — 93010 ELECTROCARDIOGRAM REPORT: CPT | Performed by: INTERNAL MEDICINE

## 2019-03-22 PROCEDURE — 71046 X-RAY EXAM CHEST 2 VIEWS: CPT

## 2019-03-22 PROCEDURE — 81003 URINALYSIS AUTO W/O SCOPE: CPT | Performed by: EMERGENCY MEDICINE

## 2019-03-22 PROCEDURE — 93005 ELECTROCARDIOGRAM TRACING: CPT

## 2019-03-22 PROCEDURE — 85025 COMPLETE CBC W/AUTO DIFF WBC: CPT | Performed by: EMERGENCY MEDICINE

## 2019-03-22 PROCEDURE — 83735 ASSAY OF MAGNESIUM: CPT | Performed by: EMERGENCY MEDICINE

## 2019-03-22 PROCEDURE — 99285 EMERGENCY DEPT VISIT HI MDM: CPT

## 2019-03-22 PROCEDURE — 99236 HOSP IP/OBS SAME DATE HI 85: CPT | Performed by: INTERNAL MEDICINE

## 2019-03-22 RX ORDER — MAGNESIUM HYDROXIDE/ALUMINUM HYDROXICE/SIMETHICONE 120; 1200; 1200 MG/30ML; MG/30ML; MG/30ML
30 SUSPENSION ORAL ONCE
Status: COMPLETED | OUTPATIENT
Start: 2019-03-22 | End: 2019-03-22

## 2019-03-22 RX ORDER — ONDANSETRON 2 MG/ML
4 INJECTION INTRAMUSCULAR; INTRAVENOUS ONCE
Status: COMPLETED | OUTPATIENT
Start: 2019-03-22 | End: 2019-03-22

## 2019-03-22 RX ORDER — FLECAINIDE ACETATE 50 MG/1
75 TABLET ORAL 2 TIMES DAILY
Qty: 90 TABLET | Refills: 3 | Status: SHIPPED | OUTPATIENT
Start: 2019-03-22 | End: 2019-09-24 | Stop reason: SDUPTHER

## 2019-03-22 RX ORDER — FAMOTIDINE 20 MG/1
20 TABLET, FILM COATED ORAL EVERY 12 HOURS
Qty: 60 TABLET | Refills: 0 | Status: SHIPPED | OUTPATIENT
Start: 2019-03-22

## 2019-03-22 RX ADMIN — SODIUM CHLORIDE 1000 ML: 0.9 INJECTION, SOLUTION INTRAVENOUS at 09:56

## 2019-03-22 RX ADMIN — ALUMINUM HYDROXIDE, MAGNESIUM HYDROXIDE, AND SIMETHICONE: 200; 200; 20 SUSPENSION ORAL at 14:09

## 2019-03-22 RX ADMIN — FAMOTIDINE 20 MG: 10 INJECTION, SOLUTION INTRAVENOUS at 14:09

## 2019-03-22 RX ADMIN — IOHEXOL 100 ML: 350 INJECTION, SOLUTION INTRAVENOUS at 10:50

## 2019-03-22 RX ADMIN — ONDANSETRON 4 MG: 2 INJECTION INTRAMUSCULAR; INTRAVENOUS at 09:56

## 2019-03-22 NOTE — CONSULTS
Consultation - Electrophysiology-Cardiology (EP)   Cece Iverson 79 y o  female MRN: [id number]  Unit/Bed#: ED 11 Encounter: [id number]      Consults    Assessment/Plan     Assessment:  1  Symptomatic paroxysmal atrial fibrillation, currently in normal sinus rhythm   A ) EKG appears to be coarse AFib, cannot rule out atypical atrial flutter  2  Paroxysmal atrial flutter status post typical cavotricuspid isthmus dependent atrial flutter ablation 2/21/2019              A ) maintained on flecainide antiarrhythmic therapy              B ) not on anticoagulation given history of von Willebrand disease (CHADS2 Vasc = 2)  2  First-degree AV block with poor AV lacy conduction and Wenckebach behavior noted during her ablation, however normal HV interval   3  Normal LV systolic function with EF 60% per echo 38/3875, grade 2 diastolic dysfunction noted  4  Nonischemic exercise stress test 11/2017  5  Von Willebrand disease with easy bruising       Plan:  1  There was initially concern for an effusion based on bedside echo, however a formal TTE was performed which showed no evidence of pericardial effusion  2  She is currently in normal sinus rhythm  I reviewed these findings with Dr Lele Keane, who recommended increasing her flecainide slightly to 75 milligrams twice daily  She is currently not on any AV lacy agents  3  I will arrange a one-week EKG follow-up for ongoing monitor on this increased dose of flecainide  I will also arrange follow-up in the near future with Dr Lele Keane  4  We do not feel that she needs to be admitted from an EP standpoint, she is stable for discharge home from our standpoint          History of Present Illness   Physician Requesting Consult: Ronaldo Yun MD  Reason for Consult / Principal Problem:  Paroxysmal atrial fibrillation    HPI: Cece Iverson is a 79y o  year old female with a history of paroxysmal atrial flutter, preserved LV systolic function, nonischemic exercise stress test, and von Willebrand's disease  She typically follows with Dr Slade Fernandes  She has had issues with paroxysmal atrial flutter for the past several years  She had been tried on pill in the pocket propafenone which she did not tolerate due to side effects  She was ultimately changed to flecainide along with diltiazem  Due to her ongoing episodes, she was seen in consultation by Dr Ronaldo Shen and an atrial flutter ablation was recommended  She underwent a successful typical flutter ablation 2019, and was discharged home the same day without complications  She was not discharged on anticoagulation given her history of von Willebrand's disease and history of bleeding  She states that last evening she went into atrial fibrillation  With this she experiences palpitations, shortness of breath with exertion, dizziness, and lightheadedness  When this persisted until the morning, she presented to the emergency room  While she was here, she spontaneously converted  She is currently resting comfortably in bed, denies all complaints  Review of Systems  ROS as noted above, otherwise 12 point review of systems was performed and is negative         Historical Information   Past Medical History:   Diagnosis Date    Anxiety     Asthma     Atrial fibrillation (HCC)     Bowel obstruction (HCC)     PAF (paroxysmal atrial fibrillation) (HCC)     Von Willebrand disease ([facility] [address] )      Past Surgical History:   Procedure Laterality Date    BUNIONECTOMY      HEMORROIDECTOMY      HYSTERECTOMY      POLYPECTOMY       Social History     Substance and Sexual Activity   Alcohol Use Yes    Alcohol/week: 8 4 oz    Types: 14 Glasses of wine per week     Social History     Substance and Sexual Activity   Drug Use No     Social History     Tobacco Use   Smoking Status Former Smoker    Types: Cigarettes, Pipe, Cigars    Last attempt to quit:     Years since quittin 2   Smokeless Tobacco Never Used   Tobacco Comment    1 pack/day for 5 years, and about 1/2 pack for 30 years     Family History:   Family History   Problem Relation Age of Onset    Heart attack Father     Heart attack Sister        Meds/Allergies   Hospital Medications: No current facility-administered medications for this encounter  Home Medications:   (Not in a hospital admission)    Allergies   Allergen Reactions    Bactrim [Sulfamethoxazole-Trimethoprim]     Wellbutrin [Bupropion]        Objective   Vitals: Blood pressure 133/67, pulse 77, temperature 97 7 °F (36 5 °C), temperature source Oral, resp  rate 17, height 5' 5" (1 651 m), weight 61 7 kg (136 lb), SpO2 98 %  Orthostatic Blood Pressures      Most Recent Value   Blood Pressure  133/67 filed at 03/22/2019 1130   Patient Position - Orthostatic VS  Lying filed at 03/22/2019 1130          No intake or output data in the 24 hours ending 03/22/19 1312    Invasive Devices     Peripheral Intravenous Line            Peripheral IV 03/22/19 Left Antecubital less than 1 day                Physical Exam   GEN: NAD, alert and oriented, well appearing  SKIN: dry without significant lesions or rashes  HEENT: NCAT, PERRL, EOMs intact  NECK: No JVD appreciated  CARDIOVASCULAR: RRR, normal S1, S2 without murmurs, rubs, or gallops appreciated  LUNGS: Clear to auscultation bilaterally without wheezes, rhonchi, or rales  ABDOMEN: Soft, nontender, nondistended  EXTREMITIES/VASCULAR: perfused without clubbing, cyanosis, or edema b/l  PSYCH: Normal mood and affect  NEURO: CN ll-Xll grossly intact        Lab Results: I have personally reviewed pertinent lab results      Results from last 7 days   Lab Units 03/22/19  0733   WBC Thousand/uL 9 76   HEMOGLOBIN g/dL 15 4   HEMATOCRIT % 45 0   PLATELETS Thousands/uL 314     Results from last 7 days   Lab Units 03/22/19  0734   POTASSIUM mmol/L 3 6   CHLORIDE mmol/L 101   CO2 mmol/L 24   BUN mg/dL 19   CREATININE mg/dL 0 91   CALCIUM mg/dL 9 2         Results from last 7 days   Lab Units 19  0734   MAGNESIUM mg/dL 2 5       Imaging: I have personally reviewed pertinent reports  ECHO:   Results for orders placed during the hospital encounter of 19   Echo complete with contrast if indicated    Narrative [address]  [facility], [address] [facility]  [phone]    Transthoracic Echocardiogram  2D, M-mode, Doppler, and Color Doppler    Study date:  22-Mar-2019    Patient: Bri Hardy  MR number: [id number]  Account number: [de-identified]  : 1950  Age: 79 years  Gender: Female  Status: Emergency  Location: Emergency room  Height: 65 in  Weight: 136 lb  BP: 125/ 76 mmHg    Indications: Rule out pericardial and/or pleural effusion; possible effusion seen on point-of-care ultrasound (POCUS)  Diagnoses: R94 31 - Abnormal electrocardiogram [ECG] [EKG]    Sonographer:  Latricia Smith RDCS  Primary Physician:  Nicole Elder MD  Referring Physician:  Katia Espitia MD  Group:  [facility]  Interpreting Physician:  Rosanne Griggs MD    SUMMARY    LEFT VENTRICLE:  Systolic function was normal  Ejection fraction was estimated to be 60 %  There were no regional wall motion abnormalities  RIGHT VENTRICLE:  The size was normal   Systolic function was normal     AORTIC VALVE:  There was mild regurgitation  PERICARDIUM:  There was no pericardial effusion  A pericardial fat pad was present  HISTORY: PRIOR HISTORY: Shortness of breath, nausea, status post atrial flutter ablation, Von Willenbrand disease, atrial fibrillation, anxiety, asthma  PROCEDURE: The procedure was performed in the emergency room  This was a routine study  The transthoracic approach was used  The study included complete 2D imaging, M-mode, complete spectral Doppler, and color Doppler  Image quality was  adequate  LEFT VENTRICLE: Size was normal  Systolic function was normal  Ejection fraction was estimated to be 60 %  There were no regional wall motion abnormalities  Wall thickness was normal  DOPPLER: Left ventricular diastolic function parameters  were normal     RIGHT VENTRICLE: The size was normal  Systolic function was normal  Wall thickness was normal     LEFT ATRIUM: Size was normal     RIGHT ATRIUM: Size was normal     MITRAL VALVE: Valve structure was normal  There was normal leaflet separation  DOPPLER: The transmitral velocity was within the normal range  There was no evidence for stenosis  There was no significant regurgitation  AORTIC VALVE: The valve was trileaflet  Leaflets exhibited normal thickness and normal cuspal separation  DOPPLER: Transaortic velocity was within the normal range  There was no evidence for stenosis  There was mild regurgitation  TRICUSPID VALVE: The valve structure was normal  There was normal leaflet separation  DOPPLER: The transtricuspid velocity was within the normal range  There was no evidence for stenosis  There was no significant regurgitation  The  tricuspid jet envelope definition was inadequate for estimation of RV systolic pressure  There are no indirect findings (abnormal RV volume or geometry, altered pulmonary flow velocity profile, or leftward septal displacement) which would  suggest moderate or severe pulmonary hypertension  PULMONIC VALVE: Leaflets exhibited normal thickness, no calcification, and normal cuspal separation  DOPPLER: The transpulmonic velocity was within the normal range  There was no significant regurgitation  PERICARDIUM: There was no pericardial effusion  A pericardial fat pad was present  AORTA: The root exhibited normal size  SYSTEMIC VEINS: IVC: The inferior vena cava was not well visualized      SYSTEM MEASUREMENT TABLES    2D mode  AV Diam: 2 7 cm  AoR Diam; Mean (2D): 2 7 cm  Area; 2D mode;: 1280 mm2  Area; Mean; Mean value chosen; 2D mode;: 1280 mm2  LA Diam (2D): 3 5 cm  LA Dimension; Mean (2D): 3 5 cm  LA/Ao (2D): 1 3  EDV (2D-Cubed): 111 cm3  EF (2D-Cubed): 70 5 %  ESV (2D-Cubed): 32 8 cm3  FS (2D-Cubed): 33 3 %  FS (2D-Teich): 33 3 %  IVS/LVPW (2D): 0 86  IVSd (2D): 0 6 cm  IVSd; Mean chosen (2D): 0 6 cm  LVIDd (2D): 4 8 cm  LVIDd; Mean (2D): 4 8 cm  LVIDs (2D): 3 2 cm  LVIDs; Mean (2D): 3 2 cm  LVPWd (2D): 0 7 cm  LVPWd; Mean (2D): 0 7 cm  Left Ventricular Ejection Fraction; Teichholz; 2D mode;: 62 %  Left Ventricular End Diastolic Volume; Teichholz; 2D mode;: 108 cm3  Left Ventricular End Systolic Volume; Teichholz; 2D mode;: 41 cm3  SV (2D-Cubed): 78 2 cm3  Stroke Volume; Teichholz; 2D mode;: 67 cm3  RVIDd (2D): 2 cm  RVIDd; Mean (2D): 2 cm  Right and Left Ventricular End Diastolic Diameter Ratio; 2D mode;: 0 42    Apical four chamber  LV MOD Diam; Recent value; End Diastole (A4C): 1 67 cm  LV MOD Diam; Recent value; End Diastole (A4C): 3 81 cm  LV MOD Diam; Recent value; End Diastole (A4C): 3 67 cm  LV MOD Diam; Recent value; End Diastole (A4C): 3 89 cm  LV MOD Diam; Recent value; End Diastole (A4C): 3 79 cm  LV MOD Diam; Recent value; End Diastole (A4C): 3 65 cm  LV MOD Diam; Recent value; End Diastole (A4C): 3 7 cm  LV MOD Diam; Recent value; End Diastole (A4C): 1 5 cm  LV MOD Diam; Recent value; End Diastole (A4C): 2 03 cm  LV MOD Diam; Recent value; End Diastole (A4C): 2 22 cm  LV MOD Diam; Recent value; End Diastole (A4C): 2 37 cm  LV MOD Diam; Recent value; End Diastole (A4C): 2 56 cm  LV MOD Diam; Recent value; End Diastole (A4C): 2 88 cm  LV MOD Diam; Recent value; End Diastole (A4C): 3 09 cm  LV MOD Diam; Recent value; End Diastole (A4C): 3 33 cm  LV MOD Diam; Recent value; End Diastole (A4C): 3 55 cm  LV MOD Diam; Recent value; End Diastole (A4C): 3 67 cm  LV MOD Diam; Recent value; End Diastole (A4C): 3 67 cm  LV MOD Diam; Recent value; End Diastole (A4C): 3 72 cm  LV MOD Diam; Recent value; End Diastole (A4C): 3 91 cm  LV MOD Diam; Recent value; End Systole (A4C): 1 29 cm  LV MOD Diam; Recent value;  End Systole (A4C): 2 41 cm  LV MOD Diam; Recent value; End Systole (A4C): 2 44 cm  LV MOD Diam; Recent value; End Systole (A4C): 2 34 cm  LV MOD Diam; Recent value; End Systole (A4C): 2 55 cm  LV MOD Diam; Recent value; End Systole (A4C): 1 9 cm  LV MOD Diam; Recent value; End Systole (A4C): 2 1 cm  LV MOD Diam; Recent value; End Systole (A4C): 2 36 cm  LV MOD Diam; Recent value; End Systole (A4C): 2 5 cm  LV MOD Diam; Recent value; End Systole (A4C): 2 62 cm  LV MOD Diam; Recent value; End Systole (A4C): 2 71 cm  LV MOD Diam; Recent value; End Systole (A4C): 2 76 cm  LV MOD Diam; Recent value; End Systole (A4C): 2 76 cm  LV MOD Diam; Recent value; End Systole (A4C): 2 69 cm  LV MOD Diam; Recent value; End Systole (A4C): 1 01 cm  LV MOD Diam; Recent value; End Systole (A4C): 1 33 cm  LV MOD Diam; Recent value; End Systole (A4C): 1 59 cm  LV MOD Diam; Recent value; End Systole (A4C): 2 39 cm  LV MOD Diam; Recent value; End Systole (A4C): 2 39 cm  LV MOD Diam; Recent value; End Systole (A4C): 2 44 cm  LVEF MOD A4C: 58 5 %  Left Ventricle diastolic major axis; Most recent value chosen; Method of Disks, Single Plane; 2D mode; Apical four chamber;: 6 52 cm  Left Ventricle systolic major axis; Most recent value chosen; Method of Disks, Single Plane; 2D mode; Apical four chamber;: 5 38 cm  Left Ventricular Diastolic Area; Most recent value chosen; Method of Disks, Single Plane; 2D mode; Apical four chamber;: 2070 mm2  Left Ventricular End Diastolic Volume; Most recent value chosen; Method of Disks, Single Plane; 2D mode; Apical four chamber;: 53 3 cm3  Left Ventricular End Systolic Volume; Most recent value chosen; Method of Disks, Single Plane; 2D mode; Apical four chamber;: 22 1 cm3  Left Ventricular Systolic Area; Most recent value chosen; Method of Disks, Single Plane; 2D mode; Apical four chamber;: 1210 mm2  SV MOD A4C: 31 2 cm3  Right Atrium Systolic Area; End Systole; 2D mode;  Apical four chamber;: 1200 mm2  Right Atrium Systolic Area; Mean; Mean value chosen; End Systole; 2D mode; Apical four chamber;: 1200 mm2    Tissue Doppler Imaging  LV Peak Early Campbell Tissue Emeka; Medial MA (TDI): 97 9 mm/s  Left Ventricular Peak Early Diastolic Tissue Velocity; Mean; Mean value chosen; Medial Mitral Annulus; Tissue Doppler Imaging;: 97 9 mm/s    Unspecified Scan Mode  LVOT Peak Grad; Mean: 3 mm[Hg]  LVOT Vmax: 921 mm/s  LVOT Vmax; Mean: 921 mm/s  MV Peak Emeka/LV Peak Tissue Emeka E-Wave; Medial MA: 10 7  DT; Antegrade Flow: 102 ms  DT; Mean; Antegrade Flow: 102 ms  Dec [place]; Antegrade Flow: 67782 mm/s2  Dec [place]; Mean; Antegrade Flow: 89853 mm/s2  MV E Emeka: 1050 mm/s  MV Peak E Emeka; Mean; Antegrade Flow: 1050 mm/s  MVA (PHT): 733 mm2  PHT: 30 ms  PHT; Mean: 30 ms  Peak Grad; Mean; Regurgitant Flow: 13 mm[Hg]  Vmax; Mean; Regurgitant Flow: 1790 mm/s  Vmax; Regurgitant Flow: 1790 mm/s    [facility] Accredited Echocardiography Laboratory    Prepared and electronically signed by    Frances Holguin MD  Signed 22-Mar-2019 12:11:44         EXERCISE STRESS TEST 11/2017: STRESS SUMMARY: pulse oxygenation pre stress test 98%  pulse oxygenation post stress test 100% Duration of exercise was 6 min and 9 sec  The patient exercised to protocol stage 3  Maximal work rate was 7 2 METs  Functional capacity was normal  Maximal heart rate during stress was 126 bpm ( 81  % of maximal predicted heart rate)  The heart rate response to stress was normal  There was normal resting blood pressure with an appropriate response to stress  The rate-pressure product for the peak heart rate and blood pressure was  [id number]  There was no chest pain during stress  The stress test was terminated due to moderate dyspnea  The stress ECG was negative for ischemia  There were no stress arrhythmias or conduction abnormalities   The sensitivity of this test was  limited by a failure to achieve target heart rate      SUMMARY:  -  Stress results: Duration of exercise was 6 min and 9 sec  Target heart rate was not achieved  There was no chest pain during stress  -  ECG conclusions: The stress ECG was negative for ischemia  The sensitivity of this test was limited by a failure to achieve target heart rate      IMPRESSIONS: Normal study after maximal exercise  Diagnostic sensitivity was limited by submaximal stress            EKG:  Coarse atrial fibrillation, cannot rule out atypical atrial flutter      Subsequent EKG shows normal sinus rhythm, prolonged SC interval with rightward axis        VTE Prophylaxis: Reason for no pharmacologic prophylaxis Von Willebrand disease with easy bruising

## 2019-03-22 NOTE — ASSESSMENT & PLAN NOTE
Former vegetarian, had fried chicken last night with wine, also drinks large amounts of green tea, didn't take her pepcid    Suspect IBD, GERD  Restart pepcid, mylanta x1  PO trial

## 2019-03-22 NOTE — ED ATTENDING ATTESTATION
Rene Cho MD, saw and evaluated the patient  I have discussed the patient with the resident/non-physician practitioner and agree with the resident's/non-physician practitioner's findings, Plan of Care, and MDM as documented in the resident's/non-physician practitioner's note, except where noted  All available labs and Radiology studies were reviewed  I was present for key portions of any procedure(s) performed by the resident/non-physician practitioner and I was immediately available to provide assistance  At this point I agree with the current assessment done in the Emergency Department  I have conducted an independent evaluation of this patient a history and physical is as follows:  Patient here with feeling of indigestion, back pain, shortness of breath  Patient has history of atrial flutter with an ablation done about a month ago  Patient states that she can often feel when she was in atrial flutter, and had the symptoms  She typically does not have back pain or nausea with that, but has it today  She states that she feels generalized unwellness, dyspnea on exertion, as well as significant nausea as well as some vomiting  Patient does not have fevers or chills  She took her flecainide this morning, and also took some Cardizem  She does not any pleuritic pain  Her review of systems otherwise negative in 12 systems reviewed  On exam the patient is awake, alert, interactive  She appears uncomfortable, is vomiting in the back  She is not pale  Her pupils are round reactive to light  Oropharynx is clear  Her heart is irregular without significant murmurs  Lungs are clear bilaterally  Abdomen is benign  Extremities are intact without significant edema  She is neurologically intact with normal skin and back exams  EKG with atrial flutter, rate controlled  Impression:  Indigestion, nausea, back pain, concern for cardiac etiology  Will plan to do cardiac evaluation    Patient has heart score of 5  Will admit for observation, will consult Cardiology given her atrial flutter      Critical Care Time  Procedures

## 2019-03-22 NOTE — ED NOTES
PT's trop came back and pt tolerated PO without issues or complications   Paged [facility]IM to see if pt is cleared for d/c      Espinoza Vanegas, RN  03/22/19 4825

## 2019-03-22 NOTE — ED NOTES
SLIM returned page; [facility] will come down and re-evaluate the pt     Matt Lizarraga RN  03/22/19 5355

## 2019-03-22 NOTE — H&P
H&P- Rufina Gutierrez 1950, 79 y o  female MRN: [id number]    Unit/Bed#: ED 11 Encounter: [id number]    Primary Care Provider: Gautam Ureña MD   Date and time admitted to hospital: 3/22/2019  7:16 AM        Paroxysmal atrial fibrillation [facility])  Assessment & Plan  Took flecainide late last night after she felt symptoms of palpitations  Shortly later had nausea and diarrhea  Possible malabsorbtion of medications from her IBD, notes that she had dinner  Consisting of friend chicken and drank wine on an empty stomach  Echocardiogram done in ED, unremarkable  Seen by EP who will increase flecainide to 75mg Q12h  Check two sets of troponin, if negative and able to tolerate diet will discharge home  Irritable bowel disease  Assessment & Plan  Former vegetarian, had fried chicken last night with wine, also drinks large amounts of green tea, didn't take her pepcid  Suspect IBD, GERD  Restart pepcid, mylanta x1  PO trial    Acute gastritis without hemorrhage  Assessment & Plan  As above, counselled to avoid spicy, fatty, alcohol, caffiene, chocolate  Restart pepcid          History of Present Illness     HPI:  Rufina Gutierrez is a 79 y o  female who presents with palpitations  She had an AFib ablation wounds month, was placed on for committing Cardizem was discontinued  Last month she had a meal of fried chicken and wine  She later went home and went to bed, felt palpitations  She then remembered to take her medications, to continue crease dose of flecainide as well as Cardizem which she had left over  Developing indigestion multiple episodes of diarrhea  Noted that she was having a rapid heartbeat and came to the ED  In the ED she was found to be in atrial flutter which self-terminated  She continued to have loose stools  A bedside echocardiogram performed in the ED was essentially unremarkable    A CTA of the chest was performed and was unremarkable for pulmonary embolism, signs of enteritis were noted  She was seen by electrophysiology who recommended increasing her foot at night to 75 mg twice daily  Continues to report some abdominal discomfort  Review of Systems   All other systems reviewed and are negative  Historical Information   Past Medical History:   Diagnosis Date    Anxiety     Asthma     Atrial fibrillation (HCC)     Bowel obstruction (HCC)     PAF (paroxysmal atrial fibrillation) (HCC)     Von Willebrand disease ([facility] [address] )      Past Surgical History:   Procedure Laterality Date    BUNIONECTOMY      HEMORROIDECTOMY      HYSTERECTOMY      POLYPECTOMY       Social History   Social History     Substance and Sexual Activity   Alcohol Use Yes    Alcohol/week: 8 4 oz    Types: 14 Glasses of wine per week     Social History     Substance and Sexual Activity   Drug Use No     Social History     Tobacco Use   Smoking Status Former Smoker    Types: Cigarettes, Pipe, Cigars    Last attempt to quit:     Years since quittin 2   Smokeless Tobacco Never Used   Tobacco Comment    1 pack/day for 5 years, and about 1/2 pack for 30 years     Family History: non-contributory    Meds/Allergies   all medications and allergies reviewed  Allergies   Allergen Reactions    Bactrim [Sulfamethoxazole-Trimethoprim]     Wellbutrin [Bupropion]        Objective   Vitals: Blood pressure 133/67, pulse 77, temperature 97 7 °F (36 5 °C), temperature source Oral, resp  rate 17, height 5' 5" (1 651 m), weight 61 7 kg (136 lb), SpO2 98 %  No intake or output data in the 24 hours ending 19 1434    Invasive Devices     Peripheral Intravenous Line            Peripheral IV 19 Left Antecubital less than 1 day                Physical Exam   Constitutional: She is oriented to person, place, and time  She appears well-developed and well-nourished  HENT:   Head: Normocephalic and atraumatic  Eyes: Pupils are equal, round, and reactive to light  EOM are normal    Neck: Neck supple  Cardiovascular: Normal rate and regular rhythm  Pulmonary/Chest: Effort normal    Abdominal: Soft  Bowel sounds are normal  She exhibits no distension  There is tenderness  Musculoskeletal: Normal range of motion  She exhibits no edema  Neurological: She is alert and oriented to person, place, and time  Skin: Skin is warm and dry  Lab Results: I have personally reviewed pertinent reports  Imaging: I have personally reviewed pertinent reports  EKG, Pathology, and Other Studies: I have personally reviewed pertinent films in PACS    Code Status: Prior  Advance Directive and Living Will:      Power of :    POLST:      Counseling / Coordination of Care  Total floor / unit time spent today 45 minutes  Greater than 50% of total time was spent with the patient and / or family counseling and / or coordination of care  A description of the counseling / coordination of care:  Discussed plan of care with patient and her  at the bedside

## 2019-03-22 NOTE — ED NOTES
Patient reports she took 120 mg of Cardizem and 100mg of Flecanide last night around midnight after starting Tachycardia around 1000pm     Levi Boggs RN  03/22/19 2053

## 2019-03-22 NOTE — ED NOTES
SLIM cleared the pt for d/c  Pt verbalized that they understand all their d/c instructions     Contacted PAC to return the ready room     Layla Shine RN  03/22/19 [address] [facility] Bruce Mccauley RN  03/22/19 0787

## 2019-03-22 NOTE — ED PROVIDER NOTES
History  Chief Complaint   Patient presents with    Irregular Heart Beat     Pt states had cardiac ablation on Feb 21  Pt states heart rate was irregular all night with slight SOB  Pt c/o diarrhea, nausea, and back pain and tachycardia this am  Pt also c/o dizziness and near syncope     Patient is a 65-year-old female presenting for palpitations  Patient has a history of atrial flutter and underwent ablation 02/21/2019 with Dr Sybil Paul  Patient states that she has not had palpitations since the ablation and has followed up with Cardiology, they decreased her flecainide after surgery  Patient states she was going to bed last night when she noticed acute onset of palpitations  Patient says that she felt her pulse at this time and it did not feel fast however felt irregular  Over the course of the night she developed an anxiety about her palpitations  She states that she had indigestion and took some Tums without relief  She felt nauseous but did not have any episodes of vomiting  Patient states that she has IBS and has chronic diarrhea for which she takes Imodium  She states that last night her diarrhea had changed to become more liquidy and volumous, she denies blood or dark stools  Patient also admits to episodes of lightheadedness, especially this morning when she was getting her things to come to the hospital   She states that she had to sit down and rest on her bed  She also states onset shortness of breath with exertion this morning  Patient states that between leaving her house and arriving at the hospital, she developed back pain that felt like a muscle soreness below her scapula across her back, she has not experienced this pain before  Upon arrival pain had dissipated  She presently denies feelings of headache, lightheadedness, shortness of breath at rest, palpitations  Prior to Admission Medications   Prescriptions Last Dose Informant Patient Reported? Taking?    Cholecalciferol (VITAMIN D3) 1000 UNITS CAPS  Self Yes Yes   Sig: Take by mouth daily   FLUoxetine (PROzac) 40 MG capsule  Self Yes Yes   Sig: Take 40 mg by mouth daily     LORazepam (ATIVAN) 0 5 mg tablet  Self Yes Yes   Sig: Take 0 5 mg by mouth every 6 (six) hours as needed for anxiety   albuterol (PROVENTIL HFA,VENTOLIN HFA) 90 mcg/act inhaler  Self Yes Yes   Sig: Inhale 2 puffs every 6 (six) hours as needed for wheezing   beclomethasone (QVAR) 40 MCG/ACT inhaler  Self Yes Yes   Sig: Inhale 2 puffs 2 (two) times a day   estradiol (ESTRACE) 1 mg tablet  Self Yes Yes   Sig: Take 1 mg by mouth daily   ferrous sulfate 325 (65 Fe) mg tablet  Self Yes Yes   Sig: Take 325 mg by mouth daily with breakfast   flecainide (TAMBOCOR) 50 mg tablet  Self No Yes   Sig: Take 1 tablet (50 mg total) by mouth 2 (two) times a day   flecainide (TAMBOCOR) 50 mg tablet   No No   Sig: Take 1 5 tablets (75 mg total) by mouth 2 (two) times a day   loperamide (IMODIUM A-D) 2 MG tablet  Self Yes Yes   Sig: Take 2 mg by mouth as needed for diarrhea   potassium chloride (K-DUR,KLOR-CON) 20 mEq tablet More than a month at Unknown time Self Yes No   Sig: Take 20 mEq by mouth daily        Facility-Administered Medications: None       Past Medical History:   Diagnosis Date    Anxiety     Asthma     Atrial fibrillation (HCC)     Bowel obstruction (HCC)     PAF (paroxysmal atrial fibrillation) (HCC)     Von Willebrand disease ([facility] [address] )        Past Surgical History:   Procedure Laterality Date    BUNIONECTOMY      HEMORROIDECTOMY      HYSTERECTOMY      POLYPECTOMY         Family History   Problem Relation Age of Onset    Heart attack Father     Heart attack Sister      I have reviewed and agree with the history as documented      Social History     Tobacco Use    Smoking status: Former Smoker     Types: Cigarettes, Pipe, Cigars     Last attempt to quit: 2011     Years since quittin 2    Smokeless tobacco: Never Used    Tobacco comment: 1 pack/day for 5 years, and about 1/2 pack for 30 years   Substance Use Topics    Alcohol use: Yes     Alcohol/week: 8 4 oz     Types: 14 Glasses of wine per week    Drug use: No        Review of Systems   Constitutional: Negative for chills and fever  Respiratory: Positive for shortness of breath  Cardiovascular: Positive for chest pain and palpitations  Gastrointestinal: Positive for diarrhea and nausea  Negative for abdominal pain, blood in stool, constipation and vomiting  Genitourinary: Negative for dysuria and hematuria  Musculoskeletal: Positive for back pain  Negative for neck pain  Neurological: Positive for light-headedness  Negative for syncope and headaches  Physical Exam  ED Triage Vitals [03/22/19 0723]   Temperature Pulse Respirations Blood Pressure SpO2   97 7 °F (36 5 °C) (!) 108 18 139/88 99 %      Temp Source Heart Rate Source Patient Position - Orthostatic VS BP Location FiO2 (%)   Oral Monitor Lying Right arm --      Pain Score       No Pain             Orthostatic Vital Signs  Vitals:    03/22/19 0723 03/22/19 0830 03/22/19 0945 03/22/19 1130   BP: 139/88 135/78 125/76 133/67   Pulse: (!) 108 92 80 77   Patient Position - Orthostatic VS: Lying Lying Lying Lying       Physical Exam   Constitutional: She is oriented to person, place, and time  She appears well-developed and well-nourished  HENT:   Head: Normocephalic and atraumatic  Eyes: Conjunctivae and EOM are normal  Right eye exhibits no discharge  Left eye exhibits no discharge  Cardiovascular: Normal rate and normal heart sounds  No murmur heard  Irregular   Pulmonary/Chest: Effort normal and breath sounds normal  No respiratory distress  Abdominal: Soft  She exhibits no distension  There is no tenderness  Musculoskeletal: She exhibits no edema, tenderness or deformity  Neurological: She is alert and oriented to person, place, and time  No cranial nerve deficit or sensory deficit  She exhibits normal muscle tone  Skin: Skin is warm  Capillary refill takes less than 2 seconds  No rash noted  She is not diaphoretic  Vitals reviewed        ED Medications  Medications   ondansetron (ZOFRAN) injection 4 mg (4 mg Intravenous Given 3/22/19 0956)   sodium chloride 0 9 % bolus 1,000 mL (0 mL Intravenous Stopped 3/22/19 1307)   iohexol (OMNIPAQUE) 350 MG/ML injection (MULTI-DOSE) 100 mL (100 mL Intravenous Given 3/22/19 1050)   famotidine (PEPCID) injection 20 mg (20 mg Intravenous Given 3/22/19 1409)   aluminum-magnesium hydroxide-simethicone (MYLANTA) 200-200-20 mg/5 mL oral suspension 30 mL ( Oral Given 3/22/19 1409)       Diagnostic Studies  Results Reviewed     Procedure Component Value Units Date/Time    Troponin I [[id number]]  (Normal) Collected:  03/22/19 1401    Lab Status:  Final result Specimen:  Blood from Arm, Left Updated:  03/22/19 1444     Troponin I 0 02 ng/mL     UA w Reflex to Microscopic w Reflex to Culture [[id number]] Collected:  03/22/19 0914    Lab Status:  Final result Specimen:  Urine, Other Updated:  03/22/19 1038     Color, UA Yellow     Clarity, UA Cloudy     Specific Gravity, UA 1 022     pH, UA 6 0     Leukocytes, UA Negative     Nitrite, UA Negative     Protein, UA Negative mg/dl      Glucose, UA Negative mg/dl      Ketones, UA Negative mg/dl      Urobilinogen, UA 0 2 E U /dl      Bilirubin, UA Negative     Blood, UA Negative    D-dimer, quantitative [[id number]]  (Abnormal) Collected:  03/22/19 0734    Lab Status:  Final result Specimen:  Blood from Arm, Left Updated:  03/22/19 0906     D-Dimer, Quant 704 ng/ml (FEU)     Magnesium [[id number]]  (Normal) Collected:  03/22/19 0734    Lab Status:  Final result Specimen:  Blood from Arm, Left Updated:  03/22/19 0856     Magnesium 2 5 mg/dL     Comprehensive metabolic panel [[id number]]  (Abnormal) Collected:  03/22/19 0734    Lab Status:  Final result Specimen:  Blood from Arm, Left Updated:  03/22/19 0818     Sodium 134 mmol/L      Potassium 3 6 mmol/L Chloride 101 mmol/L      CO2 24 mmol/L      ANION GAP 9 mmol/L      BUN 19 mg/dL      Creatinine 0 91 mg/dL      Glucose 132 mg/dL      Calcium 9 2 mg/dL      AST 28 U/L      ALT 30 U/L      Alkaline Phosphatase 83 U/L      Total Protein 8 1 g/dL      Albumin 3 8 g/dL      Total Bilirubin 0 49 mg/dL      eGFR 66 ml/min/1 73sq m     Narrative:       National Kidney Disease Education Program recommendations are as follows:  GFR calculation is accurate only with a steady state creatinine  Chronic Kidney disease less than 60 ml/min/1 73 sq  meters  Kidney failure less than 15 ml/min/1 73 sq  meters  Troponin I [[id number]]  (Normal) Collected:  03/22/19 0733    Lab Status:  Final result Specimen:  Blood from Arm, Left Updated:  03/22/19 0818     Troponin I <0 02 ng/mL     CBC and differential [[id number]] Collected:  03/22/19 0733    Lab Status:  Final result Specimen:  Blood from Arm, Left Updated:  03/22/19 0803     WBC 9 76 Thousand/uL      RBC 4 72 Million/uL      Hemoglobin 15 4 g/dL      Hematocrit 45 0 %      MCV 95 fL      MCH 32 6 pg      MCHC 34 2 g/dL      RDW 13 1 %      MPV 10 4 fL      Platelets 583 Thousands/uL      nRBC 0 /100 WBCs      Neutrophils Relative 70 %      Immat GRANS % 0 %      Lymphocytes Relative 17 %      Monocytes Relative 10 %      Eosinophils Relative 2 %      Basophils Relative 1 %      Neutrophils Absolute 6 90 Thousands/µL      Immature Grans Absolute 0 03 Thousand/uL      Lymphocytes Absolute 1 61 Thousands/µL      Monocytes Absolute 0 98 Thousand/µL      Eosinophils Absolute 0 17 Thousand/µL      Basophils Absolute 0 07 Thousands/µL                  PE Study with CT Abdomen and Pelvis with contrast   Final Result by Nieves Coreas DO (03/22 1129)      1  No evidence of pulmonary embolus  2  Fluid-filled normal caliber small and large bowel loops suggesting occult enterocolitis/diarrheal illness  Malabsorption syndrome could also be considered   No evidence of small bowel obstruction or inflammatory changes in the mesentery  3  Small hiatal hernia  Workstation performed: [id number]         XR chest 2 views   Final Result by Kayode Grant MD (03/22 0213)      No acute cardiopulmonary disease  Workstation performed: [id number]               Procedures  Procedures      Phone Consults  ED Phone Contact    ED Course  ED Course as of Mar 25 1959   Fri Mar 22, 2019   0916 D-DIMER QUANTITATIVE(!): 704           Identification of Seniors at Risk      Most Recent Value   (ISAR) Identification of Seniors at Risk   Before the illness or injury that brought you to the Emergency, did you need someone to help you on a regular basis? 0 Filed at: 03/22/2019 1631   In the last 24 hours, have you needed more help than usual?  0 Filed at: 03/22/2019 8567   Have you been hospitalized for one or more nights during the past 6 months? 1 Filed at: 03/22/2019 0723   In general, do you see well?  0 Filed at: 03/22/2019 9583   In general, do you have serious problems with your memory? 0 Filed at: 03/22/2019 9636   Do you take more than three different medications every day?   1 Filed at: 03/22/2019 8583   ISAR Score  2 Filed at: 03/22/2019 4251                    Wells' Criteria for PE      Most Recent Value   Wells' Criteria for PE   Clinical signs and symptoms of DVT  0 Filed at: 03/22/2019 6165   PE is primary diagnosis or equally likely  0 Filed at: 03/22/2019 0833   HR >100  1 5 Filed at: 03/22/2019 3384   Immobilization at least 3 days or Surgery in the previous 4 weeks  1 5 Filed at: 03/22/2019 0689   Previous, objectively diagnosed PE or DVT  0 Filed at: 03/22/2019 3282   Hemoptysis  0 Filed at: 03/22/2019 7429   Malignancy with treatment within 6 months or palliative  0 Filed at: 03/22/2019 2362   Wells' Criteria Total  3 Filed at: 03/22/2019 6001            MDM  Number of Diagnoses or Management Options  Palpitations:   Paroxysmal atrial fibrillation ([facility] [address] ): Pericardial effusion:   Typical atrial flutter [facility]):   Diagnosis management comments: CT PE with abdomen/pelvis ordered over concern of PE or dissection causing patient's symptoms  This was negative and patient underwent POCUS cardiac exam, which was interpreted as pericardial effusion  Dr Anitha Morocho was contacted and bedside echo was performed  In this time, patient was complaining of nausea and dry-heaving  She spontaneously converted to sinus rhythm and also received zofran, her nausea resolved  EP evaluated patient and made medication changes  Patient was admitted to [address] [place] [address] for ACS rule out as cause of palpitations  Disposition  Final diagnoses:   Paroxysmal atrial fibrillation (HCC)   Typical atrial flutter (HCC)   Pericardial effusion   Palpitations     Time reflects when diagnosis was documented in both MDM as applicable and the Disposition within this note     Time User Action Codes Description Comment    3/22/2019 10:56 AM Wilfrid Gaw Add [I48 0] Paroxysmal atrial fibrillation ([facility] [address] )     3/22/2019 10:56 AM Wilfrid Gaw Modify [I48 0] Paroxysmal atrial fibrillation ([facility] [address] )     3/22/2019 10:56 AM [place] Gaw Add [I48 3] Typical atrial flutter ([facility] [address] )     3/22/2019 10:56 AM Wilfrid Gaw Add [I31 3] Pericardial effusion     3/22/2019 12:10 PM [place] Gaw Add [R00 2] Palpitations     3/22/2019  3:30 PM Juan Antoino Neat Add [K29 00] Acute gastritis without hemorrhage, unspecified gastritis type       ED Disposition     ED Disposition Condition Date/Time Comment    Admit Stable Fri Mar 22, 2019 12:10 PM Case was discussed with MICHAEL and the patient's admission status was agreed to be Admission Status: observation status to the service of Dr Demetra Rashid          Follow-up Information     Follow up With Specialties Details Why Contact Info    Stephanie Riddle MD Cardiology Follow up 3/29/2019, 9:20 AM - one week ECG follow up  5/7/2019, 2:20 PM - post ER follow up Nathalie GILL [address] [facility] [address] Yomi Brown MD Internal Medicine Schedule an appointment as soon as possible for a visit in 1 week(s)  [address] [address]  [phone]            Discharge Medication List as of 3/22/2019  3:56 PM      START taking these medications    Details   famotidine (PEPCID) 20 mg tablet Take 1 tablet (20 mg total) by mouth every 12 (twelve) hours, Starting Fri 3/22/2019, Print         CONTINUE these medications which have CHANGED    Details   flecainide (TAMBOCOR) 50 mg tablet Take 1 5 tablets (75 mg total) by mouth 2 (two) times a day, Starting Fri 3/22/2019, Normal         CONTINUE these medications which have NOT CHANGED    Details   albuterol (PROVENTIL HFA,VENTOLIN HFA) 90 mcg/act inhaler Inhale 2 puffs every 6 (six) hours as needed for wheezing, Historical Med      beclomethasone (QVAR) 40 MCG/ACT inhaler Inhale 2 puffs 2 (two) times a day, Historical Med      Cholecalciferol (VITAMIN D3) 1000 UNITS CAPS Take by mouth daily, Historical Med      estradiol (ESTRACE) 1 mg tablet Take 1 mg by mouth daily, Historical Med      ferrous sulfate 325 (65 Fe) mg tablet Take 325 mg by mouth daily with breakfast, Historical Med      FLUoxetine (PROzac) 40 MG capsule Take 40 mg by mouth daily  , Historical Med      loperamide (IMODIUM A-D) 2 MG tablet Take 2 mg by mouth as needed for diarrhea, Historical Med      LORazepam (ATIVAN) 0 5 mg tablet Take 0 5 mg by mouth every 6 (six) hours as needed for anxiety, Historical Med      potassium chloride (K-DUR,KLOR-CON) 20 mEq tablet Take 20 mEq by mouth daily  , Historical Med           Outpatient Discharge Orders   Discharge Diet     Activity as tolerated       ED Provider  Attending physically available and evaluated Excell Roxanne TRINIDAD managed the patient along with the ED Attending      Electronically Signed by         Kulwinder Nunez DO  03/25/19 1959

## 2019-03-22 NOTE — DISCHARGE SUMMARY
Discharge Summary - [address] Internal Medicine    Patient Information: Stu Yarbrough 79 y o  female MRN: [id number]  Unit/Bed#: ED 11 Encounter: [id number]    Discharging Physician / Practitioner: Eagle Baig MD  PCP: April Rangel MD  Admission Date: 3/22/2019  Discharge Date: 03/22/19    Disposition:     Home    Reason for Admission: atrial fib/flutter    Discharge Diagnoses: Active Problems:    Paroxysmal atrial fibrillation (HCC)    Acute gastritis without hemorrhage    Irritable bowel disease  Resolved Problems:    * No resolved hospital problems  *      Consultations During Hospital Stay:  · EP    Procedures Performed:     · Echocardiogram - no pericardial effusion, preserved EF    Significant Findings / Test Results:     · Atrial flutter on EKG    Incidental Findings:   · None     Test Results Pending at Discharge (will require follow up): · None     Outpatient Tests Requested:  · EKG 1 week    Complications:  None    Hospital Course:     Stu Yarbrough is a 79 y o  female patient who originally presented to the hospital on 3/22/2019 due to palpitations, nausea, diarrhea  Patient presented to the ED was found to be in atrial flutter  She did convert to sinus rhythm but continued to have loose stools  She was placed in observation, seen by electrophysiology who recommended increasing her flecainide  She was treated with Pepcid, Mylanta, diet was advanced which she tolerated without difficulty  No further episodes of diarrhea occurred  She was discharged home to follow up with primary care electrophysiology as an outpatient  She was counseled on proper medication administration, same time every day, approximately 12 hours apart  He is counseled on avoiding alcohol, caffeine, chocolate, spicy foods, fatty foods      Condition at Discharge: stable     Discharge Day Visit / Exam:     Subjective:  Denies abdominal pain, diarrhea, palpitations, tolerating diet  Vitals: Blood Pressure: 133/67 (03/22/19 1130)  Pulse: 77 (03/22/19 1130)  Temperature: 97 7 °F (36 5 °C) (03/22/19 0723)  Temp Source: Oral (03/22/19 0723)  Respirations: 17 (03/22/19 1130)  Height: 5' 5" (165 1 cm) (03/22/19 0723)  Weight - Scale: 61 7 kg (136 lb) (03/22/19 0723)  SpO2: 98 % (03/22/19 1130)  Exam:   Physical Exam   Constitutional: She is oriented to person, place, and time  She appears well-developed and well-nourished  HENT:   Head: Normocephalic and atraumatic  Eyes: Pupils are equal, round, and reactive to light  EOM are normal    Neck: Neck supple  Abdominal: Soft  She exhibits no distension  There is no tenderness  Neurological: She is alert and oriented to person, place, and time  Skin: Skin is warm and dry  Discussion with Family:  at bedside    Discharge instructions/Information to patient and family:   See after visit summary for information provided to patient and family  Provisions for Follow-Up Care:  See after visit summary for information related to follow-up care and any pertinent home health orders  Planned Readmission: None     Discharge Statement:  I spent 40 minutes discharging the patient  This time was spent on the day of discharge  I had direct contact with the patient on the day of discharge  Greater than 50% of the total time was spent examining patient, answering all patient questions, arranging and discussing plan of care with patient as well as directly providing post-discharge instructions  Additional time then spent on discharge activities  Discharge Medications:  See after visit summary for reconciled discharge medications provided to patient and family        ** Please Note: This note has been constructed using a voice recognition system **

## 2019-03-22 NOTE — ED NOTES
Admitting indicated that they want to do an "obs" in the ED to ensure the pt can tolerate PO and then the pt will be d/c       Laurel Clay RN  03/22/19 8121

## 2019-03-22 NOTE — ASSESSMENT & PLAN NOTE
Took flecainide late last night after she felt symptoms of palpitations  Shortly later had nausea and diarrhea  Possible malabsorbtion of medications from her IBD, notes that she had dinner  Consisting of friend chicken and drank wine on an empty stomach  Echocardiogram done in ED, unremarkable  Seen by EP who will increase flecainide to 75mg Q12h  Check two sets of troponin, if negative and able to tolerate diet will discharge home

## 2019-03-22 NOTE — DISCHARGE INSTR - AVS FIRST PAGE
Avoid/limit spicy foods, fatty foods, alcohol, caffeine, chocolate  Take flecainide 12 hours apart at the same time every day

## 2019-09-23 NOTE — PROGRESS NOTES
Cardiology Outpatient Follow up Note    Francisco Tran 81 y o  female MRN: [id number]    09/24/19          Assessment/Plan:    1  Paroxysmal atrial fibrillation/aflutter  30 day TTM 11/16 showed no recurrence of afib, but she had more issues with afib/flutter over summer 2017  She is not currently a candidate for Watchman device (ALBA closure device) because she cannot tolerated oral anticoagulation even for 45 days, and overall CHADS Vasc score low  She did see Dr Dc Florian who did not recommend anticoagulation and aspirin only twice a week due to her bleeding from hemorrhoids and history of anemia  She did not tolerate pill in the pocket Propafenone due to side effects, started Flecainide daily in 8/17 to help prevent recurrences  Stress EKG negative for inducible ischemia 11/17 at slightly submaximal HR (81% MPHR)  She saw Dr Ivon Rojas 10/18, he reviewed her EKGs and felt all were consistent with aflutter, not afib  He felt she would benefit symptomatically from aflutter ablation  Underwent CTI dependent aflutter ablation with Dr Ivon Rojas 2/21/19  At discharge Diltiazem was stopped and flecainide dose was reduced to 50 bid  Flecainide increased to 75 bid 3/19 when she presented with what was felt to be coarse atrial fibrillation which converted spontaneously  Has some palpitations at night, likely some ectopic beats, offered Holter monitor but she would like to hold off for now  1  Typical atrial flutter (HCC)  POCT ECG   2  Paroxysmal atrial fibrillation (HCC)  POCT ECG    flecainide (TAMBOCOR) 50 mg tablet   3  Von Willebrand disease ([facility] )         HPI: 81 y o  woman with a history of Von Willebrand disease who is here for follow up of paroxysmal atrial fibrillation  She was admitted to [facility] with bronchitis in 10/16  At that time she was noted to have paroxysmal afib, which she was not symptomatically aware of  She was started on Diltiazem for rate control   Beta blocker was avoided due to her bronchospasm with bronchitis/asthma  She was discharged on ASA 80 after discussions with Hematology due to her increased bleeding risk with von Willebrand's, and the plan was to consider event monitor after she recovered from bronchitis to see if she continued to have episodes of afib, as Hematology felt she should be taken off of aspirin if able in the future  Echo 10/16 showed normal LV size and function, EF 60%, no RWMA, grade II diastolic dysfunction, normal RV size and function  Atrial septum bows from left to right consistent with increased left atrial pressure  Trace MR  Trace TR      30 day TTM in 11-12/16 showed no afib, only sinus rhythm and sinus tachycardia with HR   She was having bruising with the aspirin, it was stopped in 12/16  In January 2017, she was diagnosed with colitis in the sigmoid colon after she had colonoscopy with Dr Beverly Cantu  She was diagnosed with strep throat and scarlet fever 5/4/17, at that time she was noted to be in afib with RVR,  bpm  She reports she had sudden onset palpitations, she felt her pulse, and it was irregular (no chest pain or shortness of breath, she felt a little foggy), she went to Urgent Care, was given Metoprolol 2 5 mg IV, and HR improved to 77 bpm  Her potassium was low  She saw Dr Cyndy Medina the following day, and was started on KCL 20 meq bid  I saw her urgently 7/13/17, when she woke up at 2:30 AM with indigestion and felt that her heartbeat was fast and irregular  She took her Cardizem at 2:30, and at 3:30 she took the Propafenone (4 pills), as well as a potassium and aspirin  She was still feeling she was in afib, but HR decreased to 60s  She went to Urgent Care, and had an EKG which showed aflutter, she was going to get IV metoprolol but wasn't given because her BP was too low at 108/72  EKG in office showed rate controlled aflutter, I sent her to ED, but by the time she was seen and had EKG she had converted back to SR   She believes the whole episode was about 12 hours long  In 7/17 - 8/17, she had three episodes of palpitations, she took Propafenone for two of the episodes, these two episodes lasted about an hour  She didn't like taking the Propafenone, as it made her have side effects - dizziness, fatigue, and taste changes  I switched her from Propafenone PRN to daily Flecainide in 8/17  Stress EKG 11/17 was negative for inducible ischemia after she exercised for 6 minutes and 9 seconds of the Mauricio protocol, although she achieved only 81% of MPHR  She reports she had colds/bronchitis in Winter 2017-18 without any recurrences, so she felt she did pretty well  In May 2018 she had some musculoskeletal pain/soreness of right shoulder/chest, worse when she lay down  She had GI testing which indicated small hiatal hernia  She has not had EGD  She was admitted in 7/18 for another episode of chest discomfort and palpitations, at urgent care she was in afib, but converted spontaneously back to SR prior to evaluation in [place] [address] ED  Troponins were negative  TSH was normal      She did see Dr Oren Mustafa, who did not recommend oral anticoagulation, and aspirin only twice a week, on daily aspirin she was noting bleeding with hemorrhoids  She reports she stopped taking aspirin in Fall 2018  She saw Dr Alix Mario 10/18, he reviewed her EKGs and felt all were consistent with aflutter, not afib  He felt she would benefit symptomatically from aflutter ablation  Underwent CTI dependent aflutter ablation with Dr Alix Mario 2/21/19  At discharge Diltiazem was stopped and flecainide dose was reduced from 100 to 50 bid  She was admitted 3/22/19 with palpitations, nausea, and diarrhea, found to be in coarse afib, converted to SR  Flecainide was increased to 75 bid by EP  Echo showed no pericardial effusion  She reports she feels foggy today  Has had issues with tooth infection requiring bone graft   Having issues with depression due to front tooth being removed, took a leave of absence from work  Does feel some palpitations at night, doesn't last long  No CP  No SOB  She is not currently exercising formally  No orthopnea  Using up to 3 pillows due to reflux/allergies        Patient Active Problem List   Diagnosis    Paroxysmal atrial fibrillation (HCC)    Asthma attack    Bronchitis    [facility]illebrand [facility] [address] [facility]    Hiatal hernia    Typical atrial flutter (HCC)    Acute gastritis without hemorrhage    Irritable bowel disease       Allergies   Allergen Reactions    Bactrim [Sulfamethoxazole-Trimethoprim]     Wellbutrin [Bupropion]          Current Outpatient Medications:     albuterol (PROVENTIL HFA,VENTOLIN HFA) 90 mcg/act inhaler, Inhale 2 puffs every 6 (six) hours as needed for wheezing, Disp: , Rfl:     beclomethasone (QVAR) 40 MCG/ACT inhaler, Inhale 2 puffs 2 (two) times a day, Disp: , Rfl:     Cholecalciferol (VITAMIN D3) 1000 UNITS CAPS, Take by mouth daily, Disp: , Rfl:     estradiol (ESTRACE) 1 mg tablet, Take 1 mg by mouth daily, Disp: , Rfl:     famotidine (PEPCID) 20 mg tablet, Take 1 tablet (20 mg total) by mouth every 12 (twelve) hours, Disp: 60 tablet, Rfl: 0    ferrous sulfate 325 (65 Fe) mg tablet, Take 325 mg by mouth daily with breakfast, Disp: , Rfl:     flecainide (TAMBOCOR) 50 mg tablet, Take 1 5 tablets (75 mg total) by mouth 2 (two) times a day, Disp: 270 tablet, Rfl: 3    FLUoxetine (PROzac) 40 MG capsule, Take 40 mg by mouth daily  , Disp: , Rfl:     loperamide (IMODIUM A-D) 2 MG tablet, Take 2 mg by mouth as needed for diarrhea, Disp: , Rfl:     LORazepam (ATIVAN) 0 5 mg tablet, Take 0 5 mg by mouth every 6 (six) hours as needed for anxiety, Disp: , Rfl:     potassium chloride (K-DUR,KLOR-CON) 20 mEq tablet, Take 20 mEq by mouth daily  , Disp: , Rfl:     Past Medical History:   Diagnosis Date    Anxiety     Asthma     Atrial fibrillation (HCC)     Bowel obstruction (HCC)     [facility]     Von Willebrand disease ([facility] )        Family History   Problem Relation Age of Onset    Heart attack Father     Heart attack Sister        Past Surgical History:   Procedure Laterality Date    BUNIONECTOMY      HEMORROIDECTOMY      HYSTERECTOMY      POLYPECTOMY         Social History     Socioeconomic History    Marital status: /Civil Union     Spouse name: Not on file    Number of children: Not on file    Years of education: Not on file    Highest education level: Not on file   Occupational History    Not on file   Social Needs    Financial resource strain: Not on file    Food insecurity:     Worry: Not on file     Inability: Not on file    Transportation needs:     Medical: Not on file     Non-medical: Not on file   Tobacco Use    Smoking status: Former Smoker     Types: Cigarettes, Pipe, Cigars     Last attempt to quit:      Years since quittin 7    Smokeless tobacco: Never Used    Tobacco comment: 1 pack/day for 5 years, and about 1/2 pack for 30 years   Substance and Sexual Activity    Alcohol use:  Yes     Alcohol/week: 14 0 standard drinks     Types: 14 Glasses of wine per week    Drug use: No    Sexual activity: Not on file   Lifestyle    Physical activity:     Days per week: Not on file     Minutes per session: Not on file    Stress: Not on file   Relationships    Social connections:     Talks on phone: Not on file     Gets together: Not on file     Attends Faith service: Not on file     Active member of club or organization: Not on file     Attends meetings of clubs or organizations: Not on file     Relationship status: Not on file    Intimate partner violence:     Fear of current or ex partner: Not on file     Emotionally abused: Not on file     Physically abused: Not on file     Forced sexual activity: Not on file   Other Topics Concern    Not on file   Social History Narrative    Not on file       Review of Systems Constitution: Positive for weight gain  Negative for chills, decreased appetite, diaphoresis, fever, malaise/fatigue, night sweats and weight loss  HENT: Negative for ear pain, hearing loss, hoarse voice, nosebleeds, sore throat and tinnitus  Eyes: Negative for blurred vision and pain  Cardiovascular: Negative  Negative for chest pain, claudication, cyanosis, dyspnea on exertion, irregular heartbeat, leg swelling, near-syncope, orthopnea, palpitations, paroxysmal nocturnal dyspnea and syncope  Respiratory: Negative for cough, hemoptysis, shortness of breath, sleep disturbances due to breathing, snoring, sputum production and wheezing  Hematologic/Lymphatic: Negative for adenopathy and bleeding problem  Does not bruise/bleed easily  Skin: Negative for color change, dry skin, flushing, itching, poor wound healing and rash  Musculoskeletal: Negative for arthritis, back pain, falls, joint pain, muscle cramps, muscle weakness, myalgias and neck pain  Gastrointestinal: Positive for diarrhea and heartburn  Negative for abdominal pain, constipation, dysphagia, hematemesis, hematochezia, melena, nausea and vomiting  Genitourinary: Negative for dysuria, frequency, hematuria, hesitancy, non-menstrual bleeding and urgency  Neurological: Positive for headaches  Negative for excessive daytime sleepiness, dizziness, focal weakness, light-headedness, loss of balance, numbness, paresthesias, tremors, vertigo and weakness  Psychiatric/Behavioral: Positive for depression  Negative for altered mental status and memory loss  The patient does not have insomnia and is not nervous/anxious  Allergic/Immunologic: Positive for environmental allergies  Negative for persistent infections         Vitals: /64 (BP Location: Left arm, Patient Position: Sitting, Cuff Size: Adult)   Pulse 73   Ht 5' 5" (1 651 m)   Wt 63 5 kg (140 lb)   SpO2 99%   BMI 23 30 kg/m²       Physical Exam:     GEN: Alert and oriented x 3, in no acute distress  Well appearing and well nourished  HEENT: Sclera anicteric, conjunctivae pink, mucous membranes moist  Oropharynx clear  NECK: Supple, no carotid bruits, no significant JVD  Trachea midline, no thyromegaly  HEART: Regular rhythm, normal S1 and S2, no murmurs, clicks, gallops or rubs  PMI nondisplaced, no thrills  LUNGS: Clear to auscultation bilaterally; no wheezes, rales, or rhonchi  No increased work of breathing or signs of respiratory distress  ABDOMEN: Soft, nontender, nondistended, normoactive bowel sounds  EXTREMITIES: Skin warm and well perfused, no clubbing, cyanosis, or edema  NEURO: No focal findings  Normal gait  Normal speech  Mood and affect normal    SKIN: Normal without suspicious lesions on exposed skin        Lab Results:       No results found for: HGBA1C  No results found for: CHOL  No results found for: HDL  No results found for: LDLCALC  No results found for: TRIG  No results found for: CHOLHDL

## 2019-09-24 ENCOUNTER — OFFICE VISIT (OUTPATIENT)
Dept: CARDIOLOGY CLINIC | Facility: CLINIC | Age: 69
End: 2019-09-24
Payer: COMMERCIAL

## 2019-09-24 VITALS
HEIGHT: 65 IN | BODY MASS INDEX: 23.32 KG/M2 | WEIGHT: 140 LBS | OXYGEN SATURATION: 99 % | DIASTOLIC BLOOD PRESSURE: 64 MMHG | HEART RATE: 73 BPM | SYSTOLIC BLOOD PRESSURE: 120 MMHG

## 2019-09-24 DIAGNOSIS — D68.0 VON WILLEBRAND DISEASE (HCC): ICD-10-CM

## 2019-09-24 DIAGNOSIS — I48.0 PAROXYSMAL ATRIAL FIBRILLATION (HCC): ICD-10-CM

## 2019-09-24 DIAGNOSIS — I48.3 TYPICAL ATRIAL FLUTTER (HCC): Primary | ICD-10-CM

## 2019-09-24 PROCEDURE — 93000 ELECTROCARDIOGRAM COMPLETE: CPT | Performed by: INTERNAL MEDICINE

## 2019-09-24 PROCEDURE — 99214 OFFICE O/P EST MOD 30 MIN: CPT | Performed by: INTERNAL MEDICINE

## 2019-09-24 RX ORDER — FLECAINIDE ACETATE 50 MG/1
75 TABLET ORAL 2 TIMES DAILY
Qty: 270 TABLET | Refills: 3 | Status: SHIPPED | OUTPATIENT
Start: 2019-09-24 | End: 2020-06-23 | Stop reason: SDUPTHER

## 2019-11-04 ENCOUNTER — HOSPITAL ENCOUNTER (EMERGENCY)
Facility: HOSPITAL | Age: 69
Discharge: HOME | End: 2019-11-04
Attending: EMERGENCY MEDICINE | Admitting: EMERGENCY MEDICINE
Payer: COMMERCIAL

## 2019-11-04 ENCOUNTER — APPOINTMENT (EMERGENCY)
Dept: CT IMAGING | Facility: HOSPITAL | Age: 69
End: 2019-11-04
Payer: COMMERCIAL

## 2019-11-04 VITALS
DIASTOLIC BLOOD PRESSURE: 75 MMHG | BODY MASS INDEX: 23.63 KG/M2 | HEART RATE: 74 BPM | WEIGHT: 142 LBS | SYSTOLIC BLOOD PRESSURE: 191 MMHG | TEMPERATURE: 97.5 F | RESPIRATION RATE: 20 BRPM | OXYGEN SATURATION: 98 %

## 2019-11-04 DIAGNOSIS — S05.12XA CONTUSION OF LEFT ORBITAL TISSUES, INITIAL ENCOUNTER: Primary | ICD-10-CM

## 2019-11-04 PROCEDURE — 99284 EMERGENCY DEPT VISIT MOD MDM: CPT

## 2019-11-04 PROCEDURE — 70450 CT HEAD/BRAIN W/O DYE: CPT

## 2019-11-04 PROCEDURE — 99283 EMERGENCY DEPT VISIT LOW MDM: CPT | Performed by: EMERGENCY MEDICINE

## 2019-11-04 NOTE — ED PROVIDER NOTES
History  Chief Complaint   Patient presents with    Fall     Pt reports fallings yesterday bracing fall with left arm and hitting left side of face hitting the floor  Pt denies blood thinners  Pt has Stefany Tapia  Pt reports ecchymosis to left eye has worsened in the last 12 hours  Pt reports "mild headache" last night, denies headache today       History provided by:  Patient   used: No    29-year-old female with history of von Willebrand's disease presented for evaluation possible injury after a fall yesterday striking her head  She landed on an outstretched left arm but denies any significant pain in the wrist, elbow or forearm  She has ecchymosis around orbital area  Stated vision seems slightly blurry but nothing concerning to her  No hyphema on exam   Complains of a mild headache but declined analgesics  Pain frontal, localized, without exacerbating symptoms  Plan CT head to rule out intracranial injury  Prior to Admission Medications   Prescriptions Last Dose Informant Patient Reported? Taking?    Cholecalciferol (VITAMIN D3) 1000 UNITS CAPS  Self Yes No   Sig: Take by mouth daily   FLUoxetine (PROzac) 40 MG capsule  Self Yes No   Sig: Take 40 mg by mouth daily     LORazepam (ATIVAN) 0 5 mg tablet  Self Yes No   Sig: Take 0 5 mg by mouth every 6 (six) hours as needed for anxiety   albuterol (PROVENTIL HFA,VENTOLIN HFA) 90 mcg/act inhaler  Self Yes No   Sig: Inhale 2 puffs every 6 (six) hours as needed for wheezing   beclomethasone (QVAR) 40 MCG/ACT inhaler  Self Yes No   Sig: Inhale 2 puffs 2 (two) times a day   estradiol (ESTRACE) 1 mg tablet  Self Yes No   Sig: Take 1 mg by mouth daily   famotidine (PEPCID) 20 mg tablet  Self No No   Sig: Take 1 tablet (20 mg total) by mouth every 12 (twelve) hours   ferrous sulfate 325 (65 Fe) mg tablet  Self Yes No   Sig: Take 325 mg by mouth daily with breakfast   flecainide (TAMBOCOR) 50 mg tablet   No No   Sig: Take 1 5 tablets (75 mg total) by mouth 2 (two) times a day   loperamide (IMODIUM A-D) 2 MG tablet  Self Yes No   Sig: Take 2 mg by mouth as needed for diarrhea   potassium chloride (K-DUR,KLOR-CON) 20 mEq tablet  Self Yes No   Sig: Take 20 mEq by mouth daily        Facility-Administered Medications: None       Past Medical History:   Diagnosis Date    Anxiety     Asthma     Atrial fibrillation (HCC)     Bowel obstruction (HCC)     [facility]ox[facility] fibrillation) ([facility] )     Von Willebrand disease ([facility] )        Past Surgical History:   Procedure Laterality Date    BUNIONECTOMY      HEMORROIDECTOMY      HYSTERECTOMY      POLYPECTOMY         Family History   Problem Relation Age of Onset    Heart attack Father     Heart attack Sister      I have reviewed and agree with the history as documented  Social History     Tobacco Use    Smoking status: Former Smoker     Types: Cigarettes, Pipe, Cigars     Last attempt to quit:      Years since quittin 8    Smokeless tobacco: Never Used    Tobacco comment: 1 pack/day for 5 years, and about 1/2 pack for 30 years   Substance Use Topics    Alcohol use: Yes     Alcohol/week: 14 0 standard drinks     Types: 14 Glasses of wine per week    Drug use: No        Review of Systems   Constitutional: Negative for activity change, appetite change, fatigue and fever  Eyes: Positive for visual disturbance  Negative for photophobia  Respiratory: Negative for chest tightness and shortness of breath  Gastrointestinal: Negative for abdominal pain, nausea and vomiting  Skin: Positive for color change and wound  Neurological: Positive for headaches  Negative for dizziness and weakness  All other systems reviewed and are negative  Physical Exam  Physical Exam   Constitutional: She is oriented to person, place, and time  She appears well-developed and well-nourished  No distress  HENT:   Head: Normocephalic     Mouth/Throat: Oropharynx is clear and moist    Eyes: Pupils are equal, round, and reactive to light  Conjunctivae and EOM are normal    Left orbital contusion  Neck: Normal range of motion  Neck supple  No c-spine tenderness  Cardiovascular: Normal rate and regular rhythm  Pulmonary/Chest: Effort normal    Abdominal: Soft  She exhibits no distension  Musculoskeletal: Normal range of motion  She exhibits no edema  Neurological: She is alert and oriented to person, place, and time  Skin: Skin is warm and dry  Psychiatric: She has a normal mood and affect  Her behavior is normal    Nursing note and vitals reviewed  Vital Signs  ED Triage Vitals [11/04/19 1610]   Temperature Pulse Respirations Blood Pressure SpO2   97 5 °F (36 4 °C) 74 20 (!) 191/75 98 %      Temp Source Heart Rate Source Patient Position - Orthostatic VS BP Location FiO2 (%)   Oral Monitor Sitting Left arm --      Pain Score       3           Vitals:    11/04/19 1610   BP: (!) 191/75   Pulse: 74   Patient Position - Orthostatic VS: Sitting         Visual Acuity      ED Medications  Medications - No data to display    Diagnostic Studies  Results Reviewed     None                 CT head without contrast   Final Result by Bucky Mclain MD (11/04 1829)      No acute intracranial abnormality  Workstation performed: [id number]                    Procedures  Procedures       ED Course                               MDM  Number of Diagnoses or Management Options  Contusion of left orbital tissues, initial encounter: new and requires workup  Diagnosis management comments: 82-year-old female with a history of von Willebrand's disease presented for evaluation after a fall with head strike yesterday  She has a left orbital contusion  No intracranial hemorrhage on CT  Patient did have a mild headache but this is since improved  Stable for discharge home         Amount and/or Complexity of Data Reviewed  Tests in the radiology section of CPT®: ordered and reviewed    Patient Progress  Patient progress: improved      Disposition  Final diagnoses:   Contusion of left orbital tissues, initial encounter     Time reflects when diagnosis was documented in both MDM as applicable and the Disposition within this note     Time User Action Codes Description Comment    11/4/2019  6:39 PM Yayo Ayala Add [S05 [id number]] Contusion of left orbital tissues, initial encounter       ED Disposition     ED Disposition Condition Date/Time Comment    Discharge Stable Mon Nov 4, 2019  6:39 PM Kortneyhakeem MathewLugo discharge to home/self care              Follow-up Information     Follow up With Specialties Details Why Contact Info Additional Information    Isabel Brock MD Internal Medicine   [address] [facility] [address]  Emergency Department Emergency Medicine  If symptoms worsen [address] [place] [address]  [phone] [facility] ED,  [address], [place], [place], [address]          Discharge Medication List as of 11/4/2019  6:39 PM      CONTINUE these medications which have NOT CHANGED    Details   albuterol (PROVENTIL HFA,VENTOLIN HFA) 90 mcg/act inhaler Inhale 2 puffs every 6 (six) hours as needed for wheezing, Historical Med      beclomethasone (QVAR) 40 MCG/ACT inhaler Inhale 2 puffs 2 (two) times a day, Historical Med      Cholecalciferol (VITAMIN D3) 1000 UNITS CAPS Take by mouth daily, Historical Med      estradiol (ESTRACE) 1 mg tablet Take 1 mg by mouth daily, Historical Med      famotidine (PEPCID) 20 mg tablet Take 1 tablet (20 mg total) by mouth every 12 (twelve) hours, Starting Fri 3/22/2019, Print      ferrous sulfate 325 (65 Fe) mg tablet Take 325 mg by mouth daily with breakfast, Historical Med      flecainide (TAMBOCOR) 50 mg tablet Take 1 5 tablets (75 mg total) by mouth 2 (two) times a day, Starting Tue 9/24/2019, Normal      FLUoxetine (PROzac) 40 MG capsule Take 40 mg by mouth daily  , Historical Med      loperamide (IMODIUM A-D) 2 MG tablet Take 2 mg by mouth as needed for diarrhea, Historical Med      LORazepam (ATIVAN) 0 5 mg tablet Take 0 5 mg by mouth every 6 (six) hours as needed for anxiety, Historical Med      potassium chloride (K-DUR,KLOR-CON) 20 mEq tablet Take 20 mEq by mouth daily  , Historical Med           No discharge procedures on file      ED Provider  Electronically Signed by           Gita Chamorro MD  11/05/19 8430

## 2019-11-25 ENCOUNTER — APPOINTMENT (OUTPATIENT)
Dept: LAB | Facility: HOSPITAL | Age: 69
End: 2019-11-25
Payer: COMMERCIAL

## 2019-11-25 ENCOUNTER — TRANSCRIBE ORDERS (OUTPATIENT)
Dept: LAB | Facility: HOSPITAL | Age: 69
End: 2019-11-25

## 2019-11-25 DIAGNOSIS — D64.9 ANEMIA, UNSPECIFIED TYPE: ICD-10-CM

## 2019-11-25 DIAGNOSIS — I10 ESSENTIAL HYPERTENSION, MALIGNANT: ICD-10-CM

## 2019-11-25 DIAGNOSIS — I10 ESSENTIAL HYPERTENSION, MALIGNANT: Primary | ICD-10-CM

## 2019-11-25 DIAGNOSIS — E55.9 VITAMIN D DEFICIENCY, UNSPECIFIED: ICD-10-CM

## 2019-11-25 LAB
25(OH)D3 SERPL-MCNC: 20.5 NG/ML (ref 30–100)
ALBUMIN SERPL BCP-MCNC: 4.1 G/DL (ref 3.5–5)
ALP SERPL-CCNC: 87 U/L (ref 46–116)
ALT SERPL W P-5'-P-CCNC: 27 U/L (ref 12–78)
ANION GAP SERPL CALCULATED.3IONS-SCNC: 10 MMOL/L (ref 4–13)
AST SERPL W P-5'-P-CCNC: 19 U/L (ref 5–45)
BASOPHILS # BLD AUTO: 0.05 THOUSANDS/ΜL (ref 0–0.1)
BASOPHILS NFR BLD AUTO: 1 % (ref 0–1)
BILIRUB SERPL-MCNC: 0.37 MG/DL (ref 0.2–1)
BUN SERPL-MCNC: 10 MG/DL (ref 5–25)
CALCIUM SERPL-MCNC: 8.8 MG/DL (ref 8.3–10.1)
CHLORIDE SERPL-SCNC: 107 MMOL/L (ref 100–108)
CHOLEST SERPL-MCNC: 213 MG/DL (ref 50–200)
CO2 SERPL-SCNC: 23 MMOL/L (ref 21–32)
CREAT SERPL-MCNC: 0.76 MG/DL (ref 0.6–1.3)
EOSINOPHIL # BLD AUTO: 0.1 THOUSAND/ΜL (ref 0–0.61)
EOSINOPHIL NFR BLD AUTO: 2 % (ref 0–6)
ERYTHROCYTE [DISTWIDTH] IN BLOOD BY AUTOMATED COUNT: 14.4 % (ref 11.6–15.1)
FERRITIN SERPL-MCNC: 40 NG/ML (ref 8–388)
GFR SERPL CREATININE-BSD FRML MDRD: 81 ML/MIN/1.73SQ M
GLUCOSE P FAST SERPL-MCNC: 87 MG/DL (ref 65–99)
HCT VFR BLD AUTO: 38.6 % (ref 34.8–46.1)
HDLC SERPL-MCNC: 86 MG/DL
HGB BLD-MCNC: 12.5 G/DL (ref 11.5–15.4)
IMM GRANULOCYTES # BLD AUTO: 0.02 THOUSAND/UL (ref 0–0.2)
IMM GRANULOCYTES NFR BLD AUTO: 0 % (ref 0–2)
IRON SERPL-MCNC: 77 UG/DL (ref 50–170)
LDLC SERPL CALC-MCNC: 95 MG/DL (ref 0–100)
LYMPHOCYTES # BLD AUTO: 0.93 THOUSANDS/ΜL (ref 0.6–4.47)
LYMPHOCYTES NFR BLD AUTO: 14 % (ref 14–44)
MCH RBC QN AUTO: 31.6 PG (ref 26.8–34.3)
MCHC RBC AUTO-ENTMCNC: 32.4 G/DL (ref 31.4–37.4)
MCV RBC AUTO: 98 FL (ref 82–98)
MONOCYTES # BLD AUTO: 0.75 THOUSAND/ΜL (ref 0.17–1.22)
MONOCYTES NFR BLD AUTO: 12 % (ref 4–12)
NEUTROPHILS # BLD AUTO: 4.65 THOUSANDS/ΜL (ref 1.85–7.62)
NEUTS SEG NFR BLD AUTO: 71 % (ref 43–75)
NONHDLC SERPL-MCNC: 127 MG/DL
NRBC BLD AUTO-RTO: 0 /100 WBCS
PLATELET # BLD AUTO: 288 THOUSANDS/UL (ref 149–390)
PMV BLD AUTO: 10 FL (ref 8.9–12.7)
POTASSIUM SERPL-SCNC: 3.9 MMOL/L (ref 3.5–5.3)
PROT SERPL-MCNC: 7.6 G/DL (ref 6.4–8.2)
RBC # BLD AUTO: 3.95 MILLION/UL (ref 3.81–5.12)
SODIUM SERPL-SCNC: 140 MMOL/L (ref 136–145)
TRIGL SERPL-MCNC: 162 MG/DL
TSH SERPL DL<=0.05 MIU/L-ACNC: 2.09 UIU/ML (ref 0.36–3.74)
WBC # BLD AUTO: 6.5 THOUSAND/UL (ref 4.31–10.16)

## 2019-11-25 PROCEDURE — 82728 ASSAY OF FERRITIN: CPT

## 2019-11-25 PROCEDURE — 85025 COMPLETE CBC W/AUTO DIFF WBC: CPT

## 2019-11-25 PROCEDURE — 82306 VITAMIN D 25 HYDROXY: CPT

## 2019-11-25 PROCEDURE — 36415 COLL VENOUS BLD VENIPUNCTURE: CPT

## 2019-11-25 PROCEDURE — 80053 COMPREHEN METABOLIC PANEL: CPT

## 2019-11-25 PROCEDURE — 83540 ASSAY OF IRON: CPT

## 2019-11-25 PROCEDURE — 84443 ASSAY THYROID STIM HORMONE: CPT

## 2019-11-25 PROCEDURE — 80061 LIPID PANEL: CPT

## 2020-04-20 ENCOUNTER — TELEPHONE (OUTPATIENT)
Dept: CARDIOLOGY CLINIC | Facility: CLINIC | Age: 70
End: 2020-04-20

## 2020-06-12 ENCOUNTER — TRANSCRIBE ORDERS (OUTPATIENT)
Dept: LAB | Facility: HOSPITAL | Age: 70
End: 2020-06-12

## 2020-06-12 ENCOUNTER — APPOINTMENT (OUTPATIENT)
Dept: LAB | Facility: HOSPITAL | Age: 70
End: 2020-06-12
Payer: COMMERCIAL

## 2020-06-12 DIAGNOSIS — I10 ESSENTIAL HYPERTENSION, MALIGNANT: Primary | ICD-10-CM

## 2020-06-12 DIAGNOSIS — I10 ESSENTIAL HYPERTENSION, MALIGNANT: ICD-10-CM

## 2020-06-12 LAB
ALBUMIN SERPL BCP-MCNC: 4 G/DL (ref 3.5–5)
ALP SERPL-CCNC: 110 U/L (ref 46–116)
ALT SERPL W P-5'-P-CCNC: 68 U/L (ref 12–78)
ANION GAP SERPL CALCULATED.3IONS-SCNC: 11 MMOL/L (ref 4–13)
AST SERPL W P-5'-P-CCNC: 47 U/L (ref 5–45)
BILIRUB SERPL-MCNC: 0.36 MG/DL (ref 0.2–1)
BUN SERPL-MCNC: 18 MG/DL (ref 5–25)
CALCIUM SERPL-MCNC: 9.5 MG/DL (ref 8.3–10.1)
CHLORIDE SERPL-SCNC: 97 MMOL/L (ref 100–108)
CO2 SERPL-SCNC: 23 MMOL/L (ref 21–32)
CREAT SERPL-MCNC: 1.48 MG/DL (ref 0.6–1.3)
GFR SERPL CREATININE-BSD FRML MDRD: 36 ML/MIN/1.73SQ M
GLUCOSE P FAST SERPL-MCNC: 91 MG/DL (ref 65–99)
POTASSIUM SERPL-SCNC: 2.7 MMOL/L (ref 3.5–5.3)
PROT SERPL-MCNC: 8.4 G/DL (ref 6.4–8.2)
SODIUM SERPL-SCNC: 131 MMOL/L (ref 136–145)

## 2020-06-12 PROCEDURE — 80053 COMPREHEN METABOLIC PANEL: CPT

## 2020-06-12 PROCEDURE — 36415 COLL VENOUS BLD VENIPUNCTURE: CPT

## 2020-06-15 ENCOUNTER — HOSPITAL ENCOUNTER (EMERGENCY)
Facility: HOSPITAL | Age: 70
Discharge: HOME | End: 2020-06-15
Attending: EMERGENCY MEDICINE | Admitting: EMERGENCY MEDICINE
Payer: COMMERCIAL

## 2020-06-15 ENCOUNTER — LAB (OUTPATIENT)
Dept: LAB | Facility: HOSPITAL | Age: 70
End: 2020-06-15
Payer: COMMERCIAL

## 2020-06-15 ENCOUNTER — TRANSCRIBE ORDERS (OUTPATIENT)
Dept: LAB | Facility: HOSPITAL | Age: 70
End: 2020-06-15

## 2020-06-15 VITALS
SYSTOLIC BLOOD PRESSURE: 131 MMHG | TEMPERATURE: 98.6 F | HEART RATE: 64 BPM | OXYGEN SATURATION: 99 % | DIASTOLIC BLOOD PRESSURE: 63 MMHG | RESPIRATION RATE: 18 BRPM

## 2020-06-15 DIAGNOSIS — E55.9 AVITAMINOSIS D: ICD-10-CM

## 2020-06-15 DIAGNOSIS — E87.6 HYPOKALEMIA: Primary | ICD-10-CM

## 2020-06-15 DIAGNOSIS — N17.9 AKI (ACUTE KIDNEY INJURY) (HCC): ICD-10-CM

## 2020-06-15 DIAGNOSIS — E55.9 AVITAMINOSIS D: Primary | ICD-10-CM

## 2020-06-15 DIAGNOSIS — I10 ESSENTIAL HYPERTENSION, MALIGNANT: ICD-10-CM

## 2020-06-15 DIAGNOSIS — R19.7 DIARRHEA: ICD-10-CM

## 2020-06-15 LAB
25(OH)D3 SERPL-MCNC: 37.1 NG/ML (ref 30–100)
ANION GAP SERPL CALCULATED.3IONS-SCNC: 7 MMOL/L (ref 4–13)
ANION GAP SERPL CALCULATED.3IONS-SCNC: 9 MMOL/L (ref 4–13)
BASOPHILS # BLD AUTO: 0.04 THOUSANDS/ΜL (ref 0–0.1)
BASOPHILS NFR BLD AUTO: 1 % (ref 0–1)
BILIRUB UR QL STRIP: NEGATIVE
BUN SERPL-MCNC: 13 MG/DL (ref 5–25)
BUN SERPL-MCNC: 14 MG/DL (ref 5–25)
CALCIUM SERPL-MCNC: 10.1 MG/DL (ref 8.3–10.1)
CALCIUM SERPL-MCNC: 8.3 MG/DL (ref 8.3–10.1)
CHLORIDE SERPL-SCNC: 97 MMOL/L (ref 100–108)
CHLORIDE SERPL-SCNC: 98 MMOL/L (ref 100–108)
CLARITY UR: CLEAR
CO2 SERPL-SCNC: 26 MMOL/L (ref 21–32)
CO2 SERPL-SCNC: 28 MMOL/L (ref 21–32)
COLOR UR: YELLOW
CREAT SERPL-MCNC: 1.32 MG/DL (ref 0.6–1.3)
CREAT SERPL-MCNC: 1.35 MG/DL (ref 0.6–1.3)
EOSINOPHIL # BLD AUTO: 0.14 THOUSAND/ΜL (ref 0–0.61)
EOSINOPHIL NFR BLD AUTO: 2 % (ref 0–6)
ERYTHROCYTE [DISTWIDTH] IN BLOOD BY AUTOMATED COUNT: 12.6 % (ref 11.6–15.1)
GFR SERPL CREATININE-BSD FRML MDRD: 41 ML/MIN/1.73SQ M
GFR SERPL CREATININE-BSD FRML MDRD: 42 ML/MIN/1.73SQ M
GLUCOSE P FAST SERPL-MCNC: 106 MG/DL (ref 65–99)
GLUCOSE SERPL-MCNC: 92 MG/DL (ref 65–140)
GLUCOSE UR STRIP-MCNC: NEGATIVE MG/DL
HCT VFR BLD AUTO: 37.8 % (ref 34.8–46.1)
HGB BLD-MCNC: 12.7 G/DL (ref 11.5–15.4)
HGB UR QL STRIP.AUTO: NEGATIVE
IMM GRANULOCYTES # BLD AUTO: 0.01 THOUSAND/UL (ref 0–0.2)
IMM GRANULOCYTES NFR BLD AUTO: 0 % (ref 0–2)
KETONES UR STRIP-MCNC: NEGATIVE MG/DL
LEUKOCYTE ESTERASE UR QL STRIP: NEGATIVE
LYMPHOCYTES # BLD AUTO: 1.1 THOUSANDS/ΜL (ref 0.6–4.47)
LYMPHOCYTES NFR BLD AUTO: 17 % (ref 14–44)
MAGNESIUM SERPL-MCNC: 1.8 MG/DL (ref 1.6–2.6)
MCH RBC QN AUTO: 31.8 PG (ref 26.8–34.3)
MCHC RBC AUTO-ENTMCNC: 33.6 G/DL (ref 31.4–37.4)
MCV RBC AUTO: 95 FL (ref 82–98)
MONOCYTES # BLD AUTO: 0.97 THOUSAND/ΜL (ref 0.17–1.22)
MONOCYTES NFR BLD AUTO: 15 % (ref 4–12)
NEUTROPHILS # BLD AUTO: 4.32 THOUSANDS/ΜL (ref 1.85–7.62)
NEUTS SEG NFR BLD AUTO: 65 % (ref 43–75)
NITRITE UR QL STRIP: NEGATIVE
NRBC BLD AUTO-RTO: 0 /100 WBCS
PH UR STRIP.AUTO: 6 [PH]
PHOSPHATE SERPL-MCNC: 3.6 MG/DL (ref 2.3–4.1)
PLATELET # BLD AUTO: 262 THOUSANDS/UL (ref 149–390)
PMV BLD AUTO: 9.2 FL (ref 8.9–12.7)
POTASSIUM SERPL-SCNC: 2.7 MMOL/L (ref 3.5–5.3)
POTASSIUM SERPL-SCNC: 3.5 MMOL/L (ref 3.5–5.3)
PROT UR STRIP-MCNC: NEGATIVE MG/DL
RBC # BLD AUTO: 4 MILLION/UL (ref 3.81–5.12)
SODIUM SERPL-SCNC: 132 MMOL/L (ref 136–145)
SODIUM SERPL-SCNC: 133 MMOL/L (ref 136–145)
SP GR UR STRIP.AUTO: <=1.005 (ref 1–1.03)
TROPONIN I SERPL-MCNC: <0.02 NG/ML
UROBILINOGEN UR QL STRIP.AUTO: 0.2 E.U./DL
WBC # BLD AUTO: 6.58 THOUSAND/UL (ref 4.31–10.16)

## 2020-06-15 PROCEDURE — 83735 ASSAY OF MAGNESIUM: CPT | Performed by: EMERGENCY MEDICINE

## 2020-06-15 PROCEDURE — 85025 COMPLETE CBC W/AUTO DIFF WBC: CPT | Performed by: EMERGENCY MEDICINE

## 2020-06-15 PROCEDURE — 87177 OVA AND PARASITES SMEARS: CPT | Performed by: EMERGENCY MEDICINE

## 2020-06-15 PROCEDURE — 96366 THER/PROPH/DIAG IV INF ADDON: CPT

## 2020-06-15 PROCEDURE — 99284 EMERGENCY DEPT VISIT MOD MDM: CPT

## 2020-06-15 PROCEDURE — 84484 ASSAY OF TROPONIN QUANT: CPT | Performed by: EMERGENCY MEDICINE

## 2020-06-15 PROCEDURE — 87505 NFCT AGENT DETECTION GI: CPT | Performed by: EMERGENCY MEDICINE

## 2020-06-15 PROCEDURE — 99284 EMERGENCY DEPT VISIT MOD MDM: CPT | Performed by: EMERGENCY MEDICINE

## 2020-06-15 PROCEDURE — 80048 BASIC METABOLIC PNL TOTAL CA: CPT

## 2020-06-15 PROCEDURE — 96365 THER/PROPH/DIAG IV INF INIT: CPT

## 2020-06-15 PROCEDURE — 82306 VITAMIN D 25 HYDROXY: CPT

## 2020-06-15 PROCEDURE — 81003 URINALYSIS AUTO W/O SCOPE: CPT | Performed by: EMERGENCY MEDICINE

## 2020-06-15 PROCEDURE — 84100 ASSAY OF PHOSPHORUS: CPT | Performed by: EMERGENCY MEDICINE

## 2020-06-15 PROCEDURE — 93005 ELECTROCARDIOGRAM TRACING: CPT

## 2020-06-15 PROCEDURE — 36415 COLL VENOUS BLD VENIPUNCTURE: CPT

## 2020-06-15 PROCEDURE — 87209 SMEAR COMPLEX STAIN: CPT | Performed by: EMERGENCY MEDICINE

## 2020-06-15 PROCEDURE — 80048 BASIC METABOLIC PNL TOTAL CA: CPT | Performed by: EMERGENCY MEDICINE

## 2020-06-15 RX ORDER — POTASSIUM CHLORIDE 20 MEQ/1
20 TABLET, EXTENDED RELEASE ORAL EVERY OTHER DAY
Qty: 3 TABLET | Refills: 0 | Status: SHIPPED | OUTPATIENT
Start: 2020-06-16 | End: 2020-06-23

## 2020-06-15 RX ORDER — POTASSIUM CHLORIDE 20 MEQ/1
40 TABLET, EXTENDED RELEASE ORAL ONCE
Status: COMPLETED | OUTPATIENT
Start: 2020-06-15 | End: 2020-06-15

## 2020-06-15 RX ORDER — POTASSIUM CHLORIDE 14.9 MG/ML
20 INJECTION INTRAVENOUS ONCE
Status: COMPLETED | OUTPATIENT
Start: 2020-06-15 | End: 2020-06-15

## 2020-06-15 RX ADMIN — SODIUM CHLORIDE 1000 ML: 0.9 INJECTION, SOLUTION INTRAVENOUS at 18:59

## 2020-06-15 RX ADMIN — POTASSIUM CHLORIDE 40 MEQ: 1500 TABLET, EXTENDED RELEASE ORAL at 18:59

## 2020-06-15 RX ADMIN — POTASSIUM CHLORIDE 20 MEQ: 14.9 INJECTION, SOLUTION INTRAVENOUS at 18:59

## 2020-06-16 LAB
ATRIAL RATE: 69 BPM
CAMPYLOBACTER DNA SPEC NAA+PROBE: NORMAL
P AXIS: 48 DEGREES
PR INTERVAL: 258 MS
QRS AXIS: 47 DEGREES
QRSD INTERVAL: 88 MS
QT INTERVAL: 396 MS
QTC INTERVAL: 424 MS
SALMONELLA DNA SPEC QL NAA+PROBE: NORMAL
SHIGA TOXIN STX GENE SPEC NAA+PROBE: NORMAL
SHIGELLA DNA SPEC QL NAA+PROBE: NORMAL
T WAVE AXIS: 12 DEGREES
VENTRICULAR RATE: 69 BPM

## 2020-06-16 PROCEDURE — 93010 ELECTROCARDIOGRAM REPORT: CPT | Performed by: INTERNAL MEDICINE

## 2020-06-19 ENCOUNTER — APPOINTMENT (OUTPATIENT)
Dept: LAB | Facility: CLINIC | Age: 70
End: 2020-06-19
Payer: COMMERCIAL

## 2020-06-19 DIAGNOSIS — N17.9 AKI (ACUTE KIDNEY INJURY) (HCC): ICD-10-CM

## 2020-06-19 LAB
ANION GAP SERPL CALCULATED.3IONS-SCNC: 9 MMOL/L (ref 4–13)
BUN SERPL-MCNC: 7 MG/DL (ref 5–25)
CALCIUM SERPL-MCNC: 10 MG/DL (ref 8.3–10.1)
CHLORIDE SERPL-SCNC: 100 MMOL/L (ref 100–108)
CO2 SERPL-SCNC: 27 MMOL/L (ref 21–32)
CREAT SERPL-MCNC: 1.13 MG/DL (ref 0.6–1.3)
GFR SERPL CREATININE-BSD FRML MDRD: 50 ML/MIN/1.73SQ M
GLUCOSE P FAST SERPL-MCNC: 94 MG/DL (ref 65–99)
O+P STL CONC: NORMAL
POTASSIUM SERPL-SCNC: 3.3 MMOL/L (ref 3.5–5.3)
SODIUM SERPL-SCNC: 136 MMOL/L (ref 136–145)

## 2020-06-19 PROCEDURE — 36415 COLL VENOUS BLD VENIPUNCTURE: CPT

## 2020-06-19 PROCEDURE — 80048 BASIC METABOLIC PNL TOTAL CA: CPT

## 2020-06-22 ENCOUNTER — LAB REQUISITION (OUTPATIENT)
Dept: LAB | Facility: HOSPITAL | Age: 70
End: 2020-06-22
Payer: COMMERCIAL

## 2020-06-22 DIAGNOSIS — I10 ESSENTIAL (PRIMARY) HYPERTENSION: ICD-10-CM

## 2020-06-22 DIAGNOSIS — E87.6 HYPOKALEMIA: ICD-10-CM

## 2020-06-22 LAB
ALBUMIN SERPL BCP-MCNC: 3.6 G/DL (ref 3.5–5)
ALP SERPL-CCNC: 91 U/L (ref 46–116)
ALT SERPL W P-5'-P-CCNC: 65 U/L (ref 12–78)
ANION GAP SERPL CALCULATED.3IONS-SCNC: 9 MMOL/L (ref 4–13)
AST SERPL W P-5'-P-CCNC: 45 U/L (ref 5–45)
BILIRUB SERPL-MCNC: 0.26 MG/DL (ref 0.2–1)
BUN SERPL-MCNC: 12 MG/DL (ref 5–25)
CALCIUM SERPL-MCNC: 9.5 MG/DL (ref 8.3–10.1)
CHLORIDE SERPL-SCNC: 101 MMOL/L (ref 100–108)
CO2 SERPL-SCNC: 26 MMOL/L (ref 21–32)
CREAT SERPL-MCNC: 1.01 MG/DL (ref 0.6–1.3)
GFR SERPL CREATININE-BSD FRML MDRD: 57 ML/MIN/1.73SQ M
GLUCOSE SERPL-MCNC: 60 MG/DL (ref 65–140)
POTASSIUM SERPL-SCNC: 3.8 MMOL/L (ref 3.5–5.3)
PROT SERPL-MCNC: 7.3 G/DL (ref 6.4–8.2)
SODIUM SERPL-SCNC: 136 MMOL/L (ref 136–145)

## 2020-06-22 PROCEDURE — 80053 COMPREHEN METABOLIC PANEL: CPT | Performed by: INTERNAL MEDICINE

## 2020-06-23 ENCOUNTER — OFFICE VISIT (OUTPATIENT)
Dept: CARDIOLOGY CLINIC | Facility: CLINIC | Age: 70
End: 2020-06-23
Payer: COMMERCIAL

## 2020-06-23 VITALS
DIASTOLIC BLOOD PRESSURE: 70 MMHG | BODY MASS INDEX: 21.83 KG/M2 | WEIGHT: 131 LBS | HEART RATE: 74 BPM | HEIGHT: 65 IN | SYSTOLIC BLOOD PRESSURE: 120 MMHG

## 2020-06-23 DIAGNOSIS — I48.3 TYPICAL ATRIAL FLUTTER (HCC): ICD-10-CM

## 2020-06-23 DIAGNOSIS — I48.0 PAROXYSMAL ATRIAL FIBRILLATION (HCC): Primary | ICD-10-CM

## 2020-06-23 DIAGNOSIS — E87.6 HYPOKALEMIA: ICD-10-CM

## 2020-06-23 DIAGNOSIS — F41.9 ANXIETY DISORDER, UNSPECIFIED TYPE: ICD-10-CM

## 2020-06-23 PROBLEM — I10 ESSENTIAL HYPERTENSION: Status: ACTIVE | Noted: 2020-06-23

## 2020-06-23 PROCEDURE — 99204 OFFICE O/P NEW MOD 45 MIN: CPT | Performed by: INTERNAL MEDICINE

## 2020-06-23 RX ORDER — CHOLESTYRAMINE 4 G/9G
1 POWDER, FOR SUSPENSION ORAL DAILY
COMMUNITY
End: 2020-12-03

## 2020-06-23 RX ORDER — FLECAINIDE ACETATE 50 MG/1
75 TABLET ORAL 2 TIMES DAILY
Qty: 270 TABLET | Refills: 3 | Status: SHIPPED | OUTPATIENT
Start: 2020-06-23 | End: 2021-08-04

## 2020-06-23 RX ORDER — POTASSIUM CHLORIDE 20 MEQ/1
20 TABLET, EXTENDED RELEASE ORAL DAILY
Qty: 90 TABLET | Refills: 3 | Status: ON HOLD | OUTPATIENT
Start: 2020-06-23 | End: 2020-08-14 | Stop reason: SDUPTHER

## 2020-08-12 ENCOUNTER — HOSPITAL ENCOUNTER (INPATIENT)
Facility: HOSPITAL | Age: 70
LOS: 1 days | Discharge: HOME | DRG: 392 | End: 2020-08-14
Attending: EMERGENCY MEDICINE | Admitting: INTERNAL MEDICINE
Payer: COMMERCIAL

## 2020-08-12 DIAGNOSIS — K52.9 COLITIS: ICD-10-CM

## 2020-08-12 DIAGNOSIS — R19.7 DIARRHEA: ICD-10-CM

## 2020-08-12 DIAGNOSIS — E86.0 DEHYDRATION: Primary | ICD-10-CM

## 2020-08-12 DIAGNOSIS — D68.0 VON WILLEBRAND DISEASE (HCC): ICD-10-CM

## 2020-08-12 DIAGNOSIS — E87.6 HYPOKALEMIA: ICD-10-CM

## 2020-08-12 PROBLEM — Z72.89 ALCOHOL USE: Status: ACTIVE | Noted: 2020-08-12

## 2020-08-12 PROBLEM — D64.9 ANEMIA: Status: ACTIVE | Noted: 2020-08-12

## 2020-08-12 PROBLEM — F10.90 ALCOHOL USE: Status: ACTIVE | Noted: 2020-08-12

## 2020-08-12 PROBLEM — Z78.9 ALCOHOL USE: Status: ACTIVE | Noted: 2020-08-12

## 2020-08-12 PROBLEM — E87.1 HYPONATREMIA: Status: ACTIVE | Noted: 2020-08-12

## 2020-08-12 LAB
ALBUMIN SERPL BCP-MCNC: 3.6 G/DL (ref 3.5–5)
ALP SERPL-CCNC: 125 U/L (ref 46–116)
ALT SERPL W P-5'-P-CCNC: 32 U/L (ref 12–78)
ANION GAP SERPL CALCULATED.3IONS-SCNC: 12 MMOL/L (ref 4–13)
ANISOCYTOSIS BLD QL SMEAR: PRESENT
APTT PPP: 26 SECONDS (ref 23–37)
AST SERPL W P-5'-P-CCNC: 25 U/L (ref 5–45)
BASOPHILS # BLD MANUAL: 0 THOUSAND/UL (ref 0–0.1)
BASOPHILS NFR MAR MANUAL: 0 % (ref 0–1)
BILIRUB SERPL-MCNC: 0.36 MG/DL (ref 0.2–1)
BUN SERPL-MCNC: 8 MG/DL (ref 5–25)
CALCIUM SERPL-MCNC: 8.9 MG/DL (ref 8.3–10.1)
CHLORIDE SERPL-SCNC: 97 MMOL/L (ref 100–108)
CO2 SERPL-SCNC: 25 MMOL/L (ref 21–32)
CREAT SERPL-MCNC: 1.12 MG/DL (ref 0.6–1.3)
EOSINOPHIL # BLD MANUAL: 0.06 THOUSAND/UL (ref 0–0.4)
EOSINOPHIL NFR BLD MANUAL: 1 % (ref 0–6)
ERYTHROCYTE [DISTWIDTH] IN BLOOD BY AUTOMATED COUNT: 17.4 % (ref 11.6–15.1)
GFR SERPL CREATININE-BSD FRML MDRD: 51 ML/MIN/1.73SQ M
GLUCOSE SERPL-MCNC: 111 MG/DL (ref 65–140)
HCT VFR BLD AUTO: 32.1 % (ref 34.8–46.1)
HEMOCCULT STL QL: NEGATIVE
HGB BLD-MCNC: 10.3 G/DL (ref 11.5–15.4)
INR PPP: 0.93 (ref 0.84–1.19)
LIPASE SERPL-CCNC: 71 U/L (ref 73–393)
LYMPHOCYTES # BLD AUTO: 0.87 THOUSAND/UL (ref 0.6–4.47)
LYMPHOCYTES # BLD AUTO: 15 % (ref 14–44)
MACROCYTES BLD QL AUTO: PRESENT
MAGNESIUM SERPL-MCNC: 2 MG/DL (ref 1.6–2.6)
MCH RBC QN AUTO: 31.4 PG (ref 26.8–34.3)
MCHC RBC AUTO-ENTMCNC: 32.1 G/DL (ref 31.4–37.4)
MCV RBC AUTO: 98 FL (ref 82–98)
METAMYELOCYTES NFR BLD MANUAL: 1 % (ref 0–1)
MONOCYTES # BLD AUTO: 0.87 THOUSAND/UL (ref 0–1.22)
MONOCYTES NFR BLD: 15 % (ref 4–12)
NEUTROPHILS # BLD MANUAL: 3.87 THOUSAND/UL (ref 1.85–7.62)
NEUTS BAND NFR BLD MANUAL: 12 % (ref 0–8)
NEUTS SEG NFR BLD AUTO: 55 % (ref 43–75)
NRBC BLD AUTO-RTO: 0 /100 WBCS
NRBC BLD AUTO-RTO: 1 /100 WBC (ref 0–2)
PLATELET # BLD AUTO: 358 THOUSANDS/UL (ref 149–390)
PLATELET BLD QL SMEAR: ADEQUATE
PMV BLD AUTO: 9.4 FL (ref 8.9–12.7)
POLYCHROMASIA BLD QL SMEAR: PRESENT
POTASSIUM SERPL-SCNC: 2.9 MMOL/L (ref 3.5–5.3)
PROT SERPL-MCNC: 8 G/DL (ref 6.4–8.2)
PROTHROMBIN TIME: 12.6 SECONDS (ref 11.6–14.5)
RBC # BLD AUTO: 3.28 MILLION/UL (ref 3.81–5.12)
SODIUM SERPL-SCNC: 134 MMOL/L (ref 136–145)
TOTAL CELLS COUNTED SPEC: 100
TROPONIN I SERPL-MCNC: <0.02 NG/ML
VARIANT LYMPHS # BLD AUTO: 1 %
WBC # BLD AUTO: 5.77 THOUSAND/UL (ref 4.31–10.16)

## 2020-08-12 PROCEDURE — 87493 C DIFF AMPLIFIED PROBE: CPT | Performed by: NURSE PRACTITIONER

## 2020-08-12 PROCEDURE — 93005 ELECTROCARDIOGRAM TRACING: CPT

## 2020-08-12 PROCEDURE — 85730 THROMBOPLASTIN TIME PARTIAL: CPT | Performed by: PHYSICIAN ASSISTANT

## 2020-08-12 PROCEDURE — 93010 ELECTROCARDIOGRAM REPORT: CPT | Performed by: INTERNAL MEDICINE

## 2020-08-12 PROCEDURE — 85027 COMPLETE CBC AUTOMATED: CPT | Performed by: EMERGENCY MEDICINE

## 2020-08-12 PROCEDURE — 85007 BL SMEAR W/DIFF WBC COUNT: CPT | Performed by: EMERGENCY MEDICINE

## 2020-08-12 PROCEDURE — 87505 NFCT AGENT DETECTION GI: CPT | Performed by: NURSE PRACTITIONER

## 2020-08-12 PROCEDURE — 96360 HYDRATION IV INFUSION INIT: CPT

## 2020-08-12 PROCEDURE — 80053 COMPREHEN METABOLIC PANEL: CPT | Performed by: EMERGENCY MEDICINE

## 2020-08-12 PROCEDURE — 83540 ASSAY OF IRON: CPT | Performed by: NURSE PRACTITIONER

## 2020-08-12 PROCEDURE — 84484 ASSAY OF TROPONIN QUANT: CPT | Performed by: PHYSICIAN ASSISTANT

## 2020-08-12 PROCEDURE — 85610 PROTHROMBIN TIME: CPT | Performed by: PHYSICIAN ASSISTANT

## 2020-08-12 PROCEDURE — 82728 ASSAY OF FERRITIN: CPT | Performed by: NURSE PRACTITIONER

## 2020-08-12 PROCEDURE — 83550 IRON BINDING TEST: CPT | Performed by: NURSE PRACTITIONER

## 2020-08-12 PROCEDURE — 36415 COLL VENOUS BLD VENIPUNCTURE: CPT | Performed by: EMERGENCY MEDICINE

## 2020-08-12 PROCEDURE — 99285 EMERGENCY DEPT VISIT HI MDM: CPT

## 2020-08-12 PROCEDURE — 99285 EMERGENCY DEPT VISIT HI MDM: CPT | Performed by: PHYSICIAN ASSISTANT

## 2020-08-12 PROCEDURE — 99220 PR INITIAL OBSERVATION CARE/DAY 70 MINUTES: CPT | Performed by: NURSE PRACTITIONER

## 2020-08-12 PROCEDURE — 83735 ASSAY OF MAGNESIUM: CPT | Performed by: NURSE PRACTITIONER

## 2020-08-12 PROCEDURE — 83690 ASSAY OF LIPASE: CPT | Performed by: EMERGENCY MEDICINE

## 2020-08-12 PROCEDURE — 82272 OCCULT BLD FECES 1-3 TESTS: CPT | Performed by: NURSE PRACTITIONER

## 2020-08-12 RX ORDER — ONDANSETRON 2 MG/ML
4 INJECTION INTRAMUSCULAR; INTRAVENOUS ONCE
Status: DISCONTINUED | OUTPATIENT
Start: 2020-08-12 | End: 2020-08-13

## 2020-08-12 RX ORDER — FLUOXETINE HYDROCHLORIDE 20 MG/1
40 CAPSULE ORAL DAILY
Status: DISCONTINUED | OUTPATIENT
Start: 2020-08-13 | End: 2020-08-14 | Stop reason: HOSPADM

## 2020-08-12 RX ORDER — SODIUM CHLORIDE 9 MG/ML
100 INJECTION, SOLUTION INTRAVENOUS CONTINUOUS
Status: DISCONTINUED | OUTPATIENT
Start: 2020-08-12 | End: 2020-08-14

## 2020-08-12 RX ORDER — FLECAINIDE ACETATE 50 MG/1
75 TABLET ORAL 2 TIMES DAILY
Status: DISCONTINUED | OUTPATIENT
Start: 2020-08-12 | End: 2020-08-14 | Stop reason: HOSPADM

## 2020-08-12 RX ORDER — FLUTICASONE PROPIONATE 44 UG/1
2 AEROSOL, METERED RESPIRATORY (INHALATION) EVERY 12 HOURS SCHEDULED
Status: DISCONTINUED | OUTPATIENT
Start: 2020-08-12 | End: 2020-08-14 | Stop reason: HOSPADM

## 2020-08-12 RX ORDER — POTASSIUM CHLORIDE 20 MEQ/1
40 TABLET, EXTENDED RELEASE ORAL ONCE
Status: DISCONTINUED | OUTPATIENT
Start: 2020-08-12 | End: 2020-08-12

## 2020-08-12 RX ORDER — ESTRADIOL 1 MG/1
1 TABLET ORAL
Status: DISCONTINUED | OUTPATIENT
Start: 2020-08-14 | End: 2020-08-14 | Stop reason: HOSPADM

## 2020-08-12 RX ORDER — ACETAMINOPHEN 325 MG/1
650 TABLET ORAL EVERY 6 HOURS PRN
Status: DISCONTINUED | OUTPATIENT
Start: 2020-08-12 | End: 2020-08-14 | Stop reason: HOSPADM

## 2020-08-12 RX ORDER — FERROUS SULFATE 325(65) MG
325 TABLET ORAL
Status: DISCONTINUED | OUTPATIENT
Start: 2020-08-13 | End: 2020-08-14 | Stop reason: HOSPADM

## 2020-08-12 RX ORDER — POTASSIUM CHLORIDE 20 MEQ/1
40 TABLET, EXTENDED RELEASE ORAL ONCE
Status: COMPLETED | OUTPATIENT
Start: 2020-08-12 | End: 2020-08-12

## 2020-08-12 RX ORDER — POTASSIUM CHLORIDE 14.9 MG/ML
20 INJECTION INTRAVENOUS
Status: DISPENSED | OUTPATIENT
Start: 2020-08-12 | End: 2020-08-12

## 2020-08-12 RX ORDER — MELATONIN
1000 DAILY
Status: DISCONTINUED | OUTPATIENT
Start: 2020-08-13 | End: 2020-08-14 | Stop reason: HOSPADM

## 2020-08-12 RX ORDER — FAMOTIDINE 20 MG/1
20 TABLET, FILM COATED ORAL EVERY 12 HOURS
Status: DISCONTINUED | OUTPATIENT
Start: 2020-08-12 | End: 2020-08-14 | Stop reason: HOSPADM

## 2020-08-12 RX ORDER — ALBUTEROL SULFATE 90 UG/1
2 AEROSOL, METERED RESPIRATORY (INHALATION) EVERY 6 HOURS PRN
Status: DISCONTINUED | OUTPATIENT
Start: 2020-08-12 | End: 2020-08-14 | Stop reason: HOSPADM

## 2020-08-12 RX ORDER — LORAZEPAM 0.5 MG/1
0.5 TABLET ORAL EVERY 8 HOURS PRN
Status: DISCONTINUED | OUTPATIENT
Start: 2020-08-12 | End: 2020-08-14 | Stop reason: HOSPADM

## 2020-08-12 RX ADMIN — SODIUM CHLORIDE 1000 ML: 0.9 INJECTION, SOLUTION INTRAVENOUS at 17:50

## 2020-08-12 RX ADMIN — FLUTICASONE PROPIONATE 2 PUFF: 44 AEROSOL, METERED RESPIRATORY (INHALATION) at 23:29

## 2020-08-12 RX ADMIN — SODIUM CHLORIDE 100 ML/HR: 0.9 INJECTION, SOLUTION INTRAVENOUS at 20:58

## 2020-08-12 RX ADMIN — POTASSIUM CHLORIDE 40 MEQ: 1500 TABLET, EXTENDED RELEASE ORAL at 22:21

## 2020-08-12 RX ADMIN — POTASSIUM CHLORIDE 20 MEQ: 14.9 INJECTION, SOLUTION INTRAVENOUS at 19:25

## 2020-08-12 NOTE — ED PROVIDER NOTES
History  Chief Complaint   Patient presents with    Dehydration     "hx of chronic diarrhea unresolved and rectal bleeding"  pt feels fatigued and heart races when she goes up and down the stairs  reports last time she felt this way her electrolytes were off  no appetite     Hx of chronic diarrhea and hx of hemorrhoids which- have been bleeding   Feels dehydrated so she came in - hx hypokalemia 2nd to this in past and feels similar so she came in for eval    No appetitie  - no vomiting  Diarrhea: taking immodium - had been on polystyrmine - but then pt dc this4 days ago  And then started imodium - pt thought she was having side affects from the polystyrmine so she stopped and started imodium  Watery stools  6-7 episodes a day - w the immodium  edisllibrands hx  Has had EGD and colonoscopies in past    No abdominal pain  Able to eat and drink - but then she stools right away  Has been here before w same - states she was hypokalemic - and needed IV k - feels same - so came back - feeling weak    + bright red rectal bleeding from her hemorrhoids  Prior to Admission Medications   Prescriptions Last Dose Informant Patient Reported? Taking?    Cholecalciferol (VITAMIN D3) 1000 UNITS CAPS  Self Yes No   Sig: Take by mouth daily   FLUoxetine (PROzac) 40 MG capsule  Self Yes No   Sig: Take 40 mg by mouth daily     LORazepam (ATIVAN) 0 5 mg tablet  Self Yes No   Sig: Take 0 5 mg by mouth every 6 (six) hours as needed for anxiety   albuterol (PROVENTIL HFA,VENTOLIN HFA) 90 mcg/act inhaler  Self Yes No   Sig: Inhale 2 puffs every 6 (six) hours as needed for wheezing   beclomethasone (QVAR) 40 MCG/ACT inhaler  Self Yes No   Sig: Inhale 2 puffs 2 (two) times a day   cholestyramine (QUESTRAN) 4 g packet  Self Yes No   Sig: Take 1 packet by mouth daily   estradiol (ESTRACE) 1 mg tablet  Self Yes No   Sig: Take 1 mg by mouth 3 (three) times a week    famotidine (PEPCID) 20 mg tablet  Self No No   Sig: Take 1 tablet (20 mg total) by mouth every 12 (twelve) hours   Patient taking differently: Take 20 mg by mouth as needed    ferrous sulfate 325 (65 Fe) mg tablet  Self Yes No   Sig: Take 325 mg by mouth as needed    flecainide (TAMBOCOR) 50 mg tablet   No No   Sig: Take 1 5 tablets (75 mg total) by mouth 2 (two) times a day   loperamide (IMODIUM A-D) 2 MG tablet  Self Yes No   Sig: Take 2 mg by mouth as needed for diarrhea   potassium chloride (K-DUR,KLOR-CON) 20 mEq tablet   No No   Sig: Take 1 tablet (20 mEq total) by mouth daily      Facility-Administered Medications: None       Past Medical History:   Diagnosis Date    Anxiety     Asthma     Atrial fibrillation (HCC)     Bowel obstruction (HCC)     Hypertension     PAF (paroxysmal atrial fibrillation) (HCC)     Von Willebrand disease ([facility] [address] )        Past Surgical History:   Procedure Laterality Date    BUNIONECTOMY      HEMORROIDECTOMY      HYSTERECTOMY      POLYPECTOMY         Family History   Problem Relation Age of Onset    Heart attack Father     Heart attack Sister      I have reviewed and agree with the history as documented  E-Cigarette/Vaping     E-Cigarette/Vaping Substances     Social History     Tobacco Use    Smoking status: Former Smoker     Types: Cigarettes, Pipe, Cigars     Last attempt to quit:      Years since quittin 6    Smokeless tobacco: Never Used    Tobacco comment: 1 pack/day for 5 years, and about 1/2 pack for 30 years   Substance Use Topics    Alcohol use: Yes     Alcohol/week: 21 0 standard drinks     Types: 21 Glasses of wine per week    Drug use: No       Review of Systems   Respiratory: Negative for chest tightness and shortness of breath  Cardiovascular: Negative  Gastrointestinal: Positive for blood in stool and diarrhea  Negative for abdominal pain and vomiting  Genitourinary: Negative  All other systems reviewed and are negative        Physical Exam  Physical Exam  Vitals signs and nursing note reviewed  Constitutional:       Appearance: She is well-developed  HENT:      Head: Normocephalic and atraumatic  Right Ear: Tympanic membrane and external ear normal       Left Ear: Tympanic membrane and external ear normal    Eyes:      Conjunctiva/sclera: Conjunctivae normal    Neck:      Musculoskeletal: Neck supple  Cardiovascular:      Rate and Rhythm: Normal rate and regular rhythm  Heart sounds: Normal heart sounds  Pulmonary:      Effort: Pulmonary effort is normal       Breath sounds: Normal breath sounds  Abdominal:      General: Bowel sounds are normal       Palpations: Abdomen is soft  Genitourinary:     Rectum: Guaiac result positive  Comments: Numerous large external hemmorids  Musculoskeletal: Normal range of motion  Lymphadenopathy:      Cervical: No cervical adenopathy  Skin:     General: Skin is warm  Findings: No rash  Neurological:      Mental Status: She is alert     Psychiatric:         Behavior: Behavior normal          Vital Signs  ED Triage Vitals   Temperature Pulse Respirations Blood Pressure SpO2   08/12/20 1635 08/12/20 1634 08/12/20 1634 08/12/20 1634 08/12/20 1634   98 6 °F (37 °C) 94 18 152/60 100 %      Temp Source Heart Rate Source Patient Position - Orthostatic VS BP Location FiO2 (%)   08/12/20 1634 08/12/20 1634 08/12/20 1800 08/12/20 1634 --   Oral Monitor Sitting Left arm       Pain Score       08/12/20 1634       3           Vitals:    08/12/20 1800 08/12/20 1930 08/12/20 2003 08/12/20 2030   BP: 130/60 146/66 137/65 143/68   Pulse: 74 78 80 74   Patient Position - Orthostatic VS: Sitting Sitting Lying Sitting         Visual Acuity  Visual Acuity      Most Recent Value   L Pupil Size (mm)  3   R Pupil Size (mm)  3          ED Medications  Medications   ondansetron (ZOFRAN) injection 4 mg (0 mg Intravenous Hold 8/12/20 1750)   potassium chloride 20 mEq IVPB (premix) ( Intravenous Restarted 8/12/20 2100)   sodium chloride 0 9 % infusion (100 mL/hr Intravenous New Bag 8/12/20 2058)   sodium chloride 0 9 % bolus 1,000 mL (0 mL Intravenous Stopped 8/12/20 1850)       Diagnostic Studies  Results Reviewed     Procedure Component Value Units Date/Time    Magnesium [[id number]]  (Normal) Collected:  08/12/20 1746    Lab Status:  Final result Specimen:  Blood from Arm, Right Updated:  08/12/20 2057     Magnesium 2 0 mg/dL     Occult blood 1-3, stool [[id number]]     Lab Status:  No result Specimen:  Stool     Clostridium difficile toxin by PCR with EIA [[id number]]     Lab Status:  No result Specimen:  Stool     Stool Enteric Bacterial Panel by PCR [[id number]]     Lab Status:  No result Specimen:  Stool     CBC and differential [[id number]]  (Abnormal) Collected:  08/12/20 1746    Lab Status:  Final result Specimen:  Blood from Arm, Right Updated:  08/12/20 1859     WBC 5 77 Thousand/uL      RBC 3 28 Million/uL      Hemoglobin 10 3 g/dL      Hematocrit 32 1 %      MCV 98 fL      MCH 31 4 pg      MCHC 32 1 g/dL      RDW 17 4 %      MPV 9 4 fL      Platelets 096 Thousands/uL      nRBC 0 /100 WBCs     Narrative: This is an appended report  These results have been appended to a previously verified report      Comprehensive metabolic panel [[id number]]  (Abnormal) Collected:  08/12/20 1746    Lab Status:  Final result Specimen:  Blood from Arm, Right Updated:  08/12/20 1835     Sodium 134 mmol/L      Potassium 2 9 mmol/L      Chloride 97 mmol/L      CO2 25 mmol/L      ANION GAP 12 mmol/L      BUN 8 mg/dL      Creatinine 1 12 mg/dL      Glucose 111 mg/dL      Calcium 8 9 mg/dL      AST 25 U/L      ALT 32 U/L      Alkaline Phosphatase 125 U/L      Total Protein 8 0 g/dL      Albumin 3 6 g/dL      Total Bilirubin 0 36 mg/dL      eGFR 51 ml/min/1 73sq m     Narrative:       [place] guidelines for Chronic Kidney Disease (CKD):     Stage 1 with normal or high GFR (GFR > 90 mL/min/1 73 square meters)    Stage 2 Mild CKD (GFR = 60-89 mL/min/1 73 square meters)    Stage 3A Moderate CKD (GFR = 45-59 mL/min/1 73 square meters)    Stage 3B Moderate CKD (GFR = 30-44 mL/min/1 73 square meters)    Stage 4 Severe CKD (GFR = 15-29 mL/min/1 73 square meters)    Stage 5 End Stage CKD (GFR <15 mL/min/1 73 square meters)  Note: GFR calculation is accurate only with a steady state creatinine    Lipase [[id number]]  (Abnormal) Collected:  08/12/20 1746    Lab Status:  Final result Specimen:  Blood from Arm, Right Updated:  08/12/20 1835     Lipase 71 u/L     Troponin I [[id number]]  (Normal) Collected:  08/12/20 1746    Lab Status:  Final result Specimen:  Blood from Arm, Right Updated:  08/12/20 1825     Troponin I <0 02 ng/mL     Protime-INR [[id number]]  (Normal) Collected:  08/12/20 1746    Lab Status:  Final result Specimen:  Blood from Arm, Right Updated:  08/12/20 1816     Protime 12 6 seconds      INR 0 93    APTT [[id number]]  (Normal) Collected:  08/12/20 1746    Lab Status:  Final result Specimen:  Blood from Arm, Right Updated:  08/12/20 1816     PTT 26 seconds                  No orders to display              Procedures  ECG 12 Lead Documentation Only    Date/Time: 8/12/2020 9:03 PM  Performed by: Garrett Irizarry PA-C  Authorized by: Garrett Irizarry PA-C     Indications / Diagnosis:  Weak  ECG reviewed by me, the ED Provider: yes    Patient location:  ED  Previous ECG:     Previous ECG:  Compared to current    Comparison ECG info:  Mel 15, 2020  Rate:     ECG rate:  79    ECG rate assessment: normal    Rhythm:     Rhythm: sinus rhythm    Ectopy:     Ectopy: none    QRS:     QRS axis:  Normal  Conduction:     Conduction: normal    ST segments:     ST segments:  Normal  T waves:     T waves: non-specific               ED Course  ED Course as of Aug 12 2104   Wed Aug 12, 2020   1924 Discussed w GI will see in consutl                                                MDM  Number of Diagnoses or Management Options  Dehydration: new and requires workup  Diarrhea: established and worsening  Hypokalemia: new and requires workup     Amount and/or Complexity of Data Reviewed  Clinical lab tests: reviewed  Decide to obtain previous medical records or to obtain history from someone other than the patient: yes  Review and summarize past medical records: yes  Discuss the patient with other providers: yes    Patient Progress  Patient progress: stable        Disposition  Final diagnoses:   Dehydration   Diarrhea   Hypokalemia     Time reflects when diagnosis was documented in both MDM as applicable and the Disposition within this note     Time User Action Codes Description Comment    8/12/2020  7:27 PM Estela Walton [E86 0] Dehydration     8/12/2020  7:27 PM Estela Walton [R19 7] Diarrhea     8/12/2020  7:27 PM Estela Walton [E87 6] Hypokalemia       ED Disposition     ED Disposition Condition Date/Time Comment    Admit Stable Wed Aug 12, 2020  7:09 PM Case was discussed with DR Ayden Vargsa and the patient's admission status was agreed to be observation  to the service of Dr Dominique Woodall    None         Patient's Medications   Discharge Prescriptions    No medications on file     No discharge procedures on file      PDMP Review     None          ED Provider  Electronically Signed by           Tonya Mills PA-C  08/12/20 8221

## 2020-08-13 ENCOUNTER — APPOINTMENT (INPATIENT)
Dept: CT IMAGING | Facility: HOSPITAL | Age: 70
DRG: 392 | End: 2020-08-13
Payer: COMMERCIAL

## 2020-08-13 PROBLEM — E87.1 HYPONATREMIA: Status: RESOLVED | Noted: 2020-08-12 | Resolved: 2020-08-13

## 2020-08-13 PROBLEM — R19.7 DIARRHEA: Status: RESOLVED | Noted: 2020-08-12 | Resolved: 2020-08-13

## 2020-08-13 PROBLEM — E87.6 HYPOKALEMIA: Status: RESOLVED | Noted: 2020-08-12 | Resolved: 2020-08-13

## 2020-08-13 LAB
ANION GAP SERPL CALCULATED.3IONS-SCNC: 12 MMOL/L (ref 4–13)
BUN SERPL-MCNC: 4 MG/DL (ref 5–25)
C DIFF TOX GENS STL QL NAA+PROBE: NEGATIVE
CALCIUM SERPL-MCNC: 8.1 MG/DL (ref 8.3–10.1)
CAMPYLOBACTER DNA SPEC NAA+PROBE: NORMAL
CHLORIDE SERPL-SCNC: 107 MMOL/L (ref 100–108)
CO2 SERPL-SCNC: 22 MMOL/L (ref 21–32)
CREAT SERPL-MCNC: 0.76 MG/DL (ref 0.6–1.3)
CRP SERPL QL: 31.9 MG/L
ERYTHROCYTE [DISTWIDTH] IN BLOOD BY AUTOMATED COUNT: 18.2 % (ref 11.6–15.1)
FERRITIN SERPL-MCNC: 47 NG/ML (ref 8–388)
GFR SERPL CREATININE-BSD FRML MDRD: 81 ML/MIN/1.73SQ M
GLUCOSE P FAST SERPL-MCNC: 79 MG/DL (ref 65–99)
GLUCOSE SERPL-MCNC: 79 MG/DL (ref 65–140)
HCT VFR BLD AUTO: 30.1 % (ref 34.8–46.1)
HGB BLD-MCNC: 9.3 G/DL (ref 11.5–15.4)
IRON SATN MFR SERPL: 72 %
IRON SERPL-MCNC: 312 UG/DL (ref 50–170)
MCH RBC QN AUTO: 31.1 PG (ref 26.8–34.3)
MCHC RBC AUTO-ENTMCNC: 30.9 G/DL (ref 31.4–37.4)
MCV RBC AUTO: 101 FL (ref 82–98)
PLATELET # BLD AUTO: 315 THOUSANDS/UL (ref 149–390)
PMV BLD AUTO: 9.3 FL (ref 8.9–12.7)
POTASSIUM SERPL-SCNC: 3.1 MMOL/L (ref 3.5–5.3)
RBC # BLD AUTO: 2.99 MILLION/UL (ref 3.81–5.12)
SALMONELLA DNA SPEC QL NAA+PROBE: NORMAL
SHIGA TOXIN STX GENE SPEC NAA+PROBE: NORMAL
SHIGELLA DNA SPEC QL NAA+PROBE: NORMAL
SODIUM SERPL-SCNC: 141 MMOL/L (ref 136–145)
TIBC SERPL-MCNC: 432 UG/DL (ref 250–450)
WBC # BLD AUTO: 5.27 THOUSAND/UL (ref 4.31–10.16)

## 2020-08-13 PROCEDURE — 86140 C-REACTIVE PROTEIN: CPT | Performed by: INTERNAL MEDICINE

## 2020-08-13 PROCEDURE — 87209 SMEAR COMPLEX STAIN: CPT | Performed by: INTERNAL MEDICINE

## 2020-08-13 PROCEDURE — 99223 1ST HOSP IP/OBS HIGH 75: CPT | Performed by: INTERNAL MEDICINE

## 2020-08-13 PROCEDURE — 80048 BASIC METABOLIC PNL TOTAL CA: CPT | Performed by: NURSE PRACTITIONER

## 2020-08-13 PROCEDURE — 85027 COMPLETE CBC AUTOMATED: CPT | Performed by: NURSE PRACTITIONER

## 2020-08-13 PROCEDURE — G1004 CDSM NDSC: HCPCS

## 2020-08-13 PROCEDURE — 74177 CT ABD & PELVIS W/CONTRAST: CPT

## 2020-08-13 PROCEDURE — 83993 ASSAY FOR CALPROTECTIN FECAL: CPT | Performed by: INTERNAL MEDICINE

## 2020-08-13 PROCEDURE — 87177 OVA AND PARASITES SMEARS: CPT | Performed by: INTERNAL MEDICINE

## 2020-08-13 PROCEDURE — 87329 GIARDIA AG IA: CPT | Performed by: INTERNAL MEDICINE

## 2020-08-13 PROCEDURE — 99232 SBSQ HOSP IP/OBS MODERATE 35: CPT | Performed by: INTERNAL MEDICINE

## 2020-08-13 RX ORDER — LOPERAMIDE HYDROCHLORIDE 2 MG/1
2 CAPSULE ORAL 4 TIMES DAILY PRN
Status: DISCONTINUED | OUTPATIENT
Start: 2020-08-13 | End: 2020-08-14 | Stop reason: HOSPADM

## 2020-08-13 RX ORDER — POTASSIUM CHLORIDE 20 MEQ/1
40 TABLET, EXTENDED RELEASE ORAL ONCE
Status: COMPLETED | OUTPATIENT
Start: 2020-08-13 | End: 2020-08-13

## 2020-08-13 RX ADMIN — FLECAINIDE ACETATE 75 MG: 50 TABLET ORAL at 18:43

## 2020-08-13 RX ADMIN — IOHEXOL 50 ML: 240 INJECTION, SOLUTION INTRATHECAL; INTRAVASCULAR; INTRAVENOUS; ORAL at 22:16

## 2020-08-13 RX ADMIN — FERROUS SULFATE TAB 325 MG (65 MG ELEMENTAL FE) 325 MG: 325 (65 FE) TAB at 09:39

## 2020-08-13 RX ADMIN — FLUTICASONE PROPIONATE 2 PUFF: 44 AEROSOL, METERED RESPIRATORY (INHALATION) at 09:41

## 2020-08-13 RX ADMIN — FLECAINIDE ACETATE 75 MG: 50 TABLET ORAL at 09:50

## 2020-08-13 RX ADMIN — FLUOXETINE 40 MG: 20 CAPSULE ORAL at 09:39

## 2020-08-13 RX ADMIN — Medication 1000 UNITS: at 09:39

## 2020-08-13 RX ADMIN — SODIUM CHLORIDE 100 ML/HR: 0.9 INJECTION, SOLUTION INTRAVENOUS at 09:38

## 2020-08-13 RX ADMIN — FAMOTIDINE 20 MG: 20 TABLET, FILM COATED ORAL at 09:39

## 2020-08-13 RX ADMIN — POTASSIUM CHLORIDE 40 MEQ: 1500 TABLET, EXTENDED RELEASE ORAL at 09:40

## 2020-08-13 RX ADMIN — SODIUM CHLORIDE 100 ML/HR: 0.9 INJECTION, SOLUTION INTRAVENOUS at 19:53

## 2020-08-13 RX ADMIN — IOHEXOL 100 ML: 350 INJECTION, SOLUTION INTRAVENOUS at 22:16

## 2020-08-13 NOTE — DISCHARGE INSTRUCTIONS
Gluten-Free Diet   WHAT YOU NEED TO KNOW:   A gluten-free diet is a meal plan that does not contain any gluten  Gluten is a protein found in wheat, rye, and barley  Gluten is found in many breads, pastas, cereals, cakes, pies, and cookies  Some vitamin supplements and medicines may also contain gluten  You need to follow this diet for the rest of your life if you have celiac disease, dermatitis herpetiformis, or non-celiac gluten sensitivity  DISCHARGE INSTRUCTIONS:   Contact your healthcare provider or dietitian if:   · You must take a medicine or vitamin that contains gluten  · Signs and symptoms of your condition get worse  · You are losing weight, without trying  · You have questions or concerns about your condition or care  Follow up with your healthcare provider or dietitian as directed:  Write down your questions so you remember to ask them during your visits  Foods to avoid:  Do not eat foods that contain the following ingredients:  · Barley, barley malt, barley extract, rye, bulgar, semolina, and triticale    · Wheat, wheat bran, wheat germ, and wheat starch     · Wheat varieties such as kamut and spelt    · Bran, couscous, durum, farina, and orzo    · Matzo flour, matzo meal, julian flour     · Oats that are not labeled as gluten-free, unless your dietitian has allowed you to eat a small amount    · Malt extract and malt flavoring    · Einkorn, emmer, faro, panko, seitan, and udon  Foods you can have:  Choose foods labeled as gluten-free  You may be able to eat gluten-free oats, or you may be able to eat regular oats in small amounts  Ask your dietitian if oats are safe for you to eat   You may eat foods that are made with the following types of grains and starches:   · Corn, potato starch, and potato flour    · Soy, tapioca, and teff    · Rice, rice bran, quinoa, sago, and sorghum    · Amaranth, arrowroot, and buckwheat    · Flours made from nuts, beans, and seeds    · Flax, millet, and tong  Examples of gluten-free foods:   · Fresh fruits and vegetables    · Eggs, meat, fish, and poultry    · Dried beans, lentils, and peas    · Beverages such as 100% fruit juice, coffee, tea, cocoa, and soft drinks    · Fats and oils, such as butter, margarine, and vegetable, canola, and olive oils    · Regular, unflavored milk, cottage cheese, most yogurts, and aged cheese such as cheddar cheese    · Cream, sour cream, cream cheese, most ice creams, and sherbets    · Cream of rice, grits, puffed rice, brown rice, and white rice    · Corn tacos and tortillas  Sample menu for 1 day:   · Breakfast:  Soft boiled eggs, gluten-free toast with butter, and milk    · Morning snack:  Gluten-free rice cakes or crackers    · Lunch:  Tuna salad with tomato and lettuce, and an apple     · Afternoon snack:  Carrot sticks    · Dinner:  Broiled chicken, baked potato, green beans, and sorbet with strawberries  Ways to make a gluten-free diet part of your lifestyle:   · Follow up with your healthcare provider or dietitian as directed  It may take time to learn how to properly follow a gluten-free diet  Your dietitian can help you find ways to fit this diet into your lifestyle  · Learn to read food labels  Gluten is found in many foods and drinks  It may not be clear which foods contain gluten  Include a variety of allowed foods so that you can get all the nutrients you need  · Prepare for restaurant meals  Carry a list of items allowed on this diet with you when you are away from home  Go to the restaurant's website or call ahead to find out if the restaurant has gluten-free meals  Tell the  that you cannot eat wheat, rye, or barley  Ask how food is prepared  · Prepare gluten-free foods separately from other foods  Cross-contamination is when foods that contain gluten get mixed in with gluten-free foods   Prevent cross contamination by cleaning counter tops and cutting boards well to remove any crumbs that contain gluten  Wash cooking utensils, colanders, and pans well before you cook gluten-free foods  Buy a separate toaster to use for gluten-free breads  Buy separate jam, jelly, mayonnaise, or peanut butter to use on gluten-free breads to avoid cross contamination  These foods are also available in squeeze containers  · Include naturally gluten-free foods that are high in iron, folate, and vitamin B12  Foods high in iron and vitamin B12 include meat, fish, poultry (chicken and fish), liver, and eggs  Foods high in folate include broccoli, asparagus, orange juice, legumes, peanuts, walnuts, and sunflower seeds  · Ask your healthcare provider if you need a vitamin and mineral supplement  Celiac disease and dermatitis herpetiformis can cause damage to your intestines  This damage can decrease the absorption of certain vitamins and minerals  Also, many gluten-free cereals, pastas, and breads are not fortified with vitamin B and iron  © 2017 [facility] Information is for End User's use only and may not be sold, redistributed or otherwise used for commercial purposes  All illustrations and images included in CareNotes® are the copyrighted property of [employer] , [employer]  or Aaron Peters  The above information is an  only  It is not intended as medical advice for individual conditions or treatments  Talk to your doctor, nurse or pharmacist before following any medical regimen to see if it is safe and effective for you

## 2020-08-13 NOTE — CONSULTS
Consultation - GI   Toyin Arshad 78 y o  female MRN: [id number]  Unit/Bed#: S -01 Encounter: [id number]      Assessment/Plan     1  Diarrhea with hematochezia, h/o hemorrhoids   Patient has history of intermittent constipation and diarrhea for many years  Episodes now tend to be more diarrhea, rarely constipation  Associated with bloating, nausea, abdominal pain which are relieved with BM  Considering imaging and scope results conveyed by the patient, very well could be IBS  Last colonoscopy 2016 - results not able to be found, but based on note unimpressive  Less likely to be infectious/IBD  Could be IBS, celiac, H  Pylori  Plan:  · Diet: patient to eat as tolerance allows, Trial of gluten free diet  · Reassess need of IVF if eating well  · Stool studies  - consider adding giardia, fecal calprotectin  · Obtain CRP  · Obtain CT AP with c  · EGD tomorrow - h pylori, celiac since patient has iron def  Anemia  · NPO midnight  · Colonoscopy outpatient    History of Present Illness   Physician Requesting Consult: Sukumar Mcnamara MD  Reason for Consult / Principal Problem: diarrhea  Hx and PE limited by:   HPI: Toyni Arshad is a 78y o  year old female with PMH of IBS, hiatal hernia, von willebrand's, Parox  A  Fib with ablation currently on flecaiide, HTN, Anxiety presented to the ED yesterday for diarrhea associated with reduced appetite  PCP started on Questran, but after reading side effects related to clotting, patient started herself on imodium 4 days ago  It did however slightly reduce BMs  She does have intermittent hematochezia, which she associates with h/o hemorrhoids, for the past 7 days  Last colonoscopy 2016 at [facility], results not found but patient states that it showed 1 polyp, and years before it showed (?)sigmoid colitis  Has a history of chronic diarrhea which initially started out as intermittent episodes of diarrhea and constipation dating back to the 90s   Previous episodes have been associated with bloating, nausea, abdominal pain, and reduced appetite which improves after a BM  IBS has always been discussed with her, but unsure if the diagnosis was made official  She usually is able to control this on her own with imodium  Do not see any biopsy results in chart review, don't think patient has had EGD done  Mentions due to her coagulopathy, she's always been anemic  Used to be on iron tablets for a long time  She used to be vegetarian for 30 years, and recently started eating red meats again in order to improve this  Has attempted gluten free diet before, which did show relief in symptoms  NO melena, fevers, chills, nausea, vomiting, abdominal pain, sour/metallic taste in mouth, dysphaiga, odynophagia, changes in weight  No recent travel, sick contacts, unhealthy pets  In Ed, K of 2 9 for which patient was admitted  Repleting  Isolated alk  Phos of 125  Hb baseline at 12-15, on admission 10 3  FOBT -  Primary team sent infectious stool studies which are pending, started on NS infusion at 100ml/hr  Continues to be on home famotidine and iron tablets  At present, Hb 9 3, likely to be dilutional drop  Hemodynamically stable  Folate and b12 pending  Iron panel elevated iron at 322  Since admission, BM frequency unchanged, but is now without blood  Improved appetite, hungry and would like to eat  Diet advanced  Does not follow with GI outpatient since last colonoscopy  Inpatient consult to gastroenterology     Performed by  Jose Angel Gallegos MD     Authorized by SUSAN Haskins              Inpatient consult to gastroenterology     Performed by  Jose Angel Gallegos MD     Authorized by Eyad Greenfield PA-C              Review of Systems   Gastrointestinal: Positive for diarrhea  All other systems reviewed and are negative        Historical Information   Past Medical History:   Diagnosis Date    Anxiety     Asthma     Atrial fibrillation ([facility] [address] )     Bowel obstruction (HCC)     Hypertension     PAF (paroxysmal atrial fibrillation) (HCC)     Von Willebrand disease ([address] )      Past Surgical History:   Procedure Laterality Date    BUNIONECTOMY      HEMORROIDECTOMY      HYSTERECTOMY      POLYPECTOMY       Social History   Social History     Substance and Sexual Activity   Alcohol Use Yes    Alcohol/week: 21 0 standard drinks    Types: 21 Glasses of wine per week     Social History     Substance and Sexual Activity   Drug Use No     E-Cigarette/Vaping     E-Cigarette/Vaping Substances     Social History     Tobacco Use   Smoking Status Former Smoker    Types: Cigarettes, Pipe, Cigars    Last attempt to quit:     Years since quittin 6   Smokeless Tobacco Never Used   Tobacco Comment    1 pack/day for 5 years, and about 1/2 pack for 30 years     Family History:   Family History   Problem Relation Age of Onset    Heart attack Father     Heart attack Sister        Meds/Allergies   all current active meds have been reviewed    Allergies   Allergen Reactions    Bactrim [Sulfamethoxazole-Trimethoprim]     Wellbutrin [Bupropion]        Objective       Intake/Output Summary (Last 24 hours) at 2020 1028  Last data filed at 2020 1388  Gross per 24 hour   Intake 2720 ml   Output 1200 ml   Net 1520 ml       Invasive Devices:   Peripheral IV 20 Right Antecubital (Active)   Site Assessment Clean;Dry; Intact 20   Dressing Type Transparent 20   Line Status Flushed; Infusing 20   Dressing Status Clean;Dry; Intact 20       Physical Exam  Vitals signs reviewed  Constitutional:       General: She is not in acute distress  Appearance: Normal appearance  She is normal weight  She is not diaphoretic  HENT:      Head: Normocephalic and atraumatic  Eyes:      Extraocular Movements: Extraocular movements intact  Cardiovascular:      Rate and Rhythm: Normal rate and regular rhythm        Pulses: Normal pulses  Heart sounds: Normal heart sounds  Pulmonary:      Effort: Pulmonary effort is normal       Breath sounds: Normal breath sounds  Abdominal:      General: Abdomen is flat  Bowel sounds are normal  There is no distension  Palpations: Abdomen is soft  There is no mass  Tenderness: There is no abdominal tenderness  Musculoskeletal:         General: No swelling or tenderness  Skin:     General: Skin is warm and dry  Neurological:      General: No focal deficit present  Mental Status: She is alert and oriented to person, place, and time  Mental status is at baseline  Psychiatric:         Mood and Affect: Mood normal          Behavior: Behavior normal          Thought Content: Thought content normal          Judgment: Judgment normal          Lab Results: I have personally reviewed pertinent reports  Imaging Studies: I have personally reviewed pertinent reports  EKG, Pathology, and Other Studies: I have personally reviewed pertinent reports  VTE Prophylaxis: Reason for no pharmacologic prophylaxis low VTE    Counseling / Coordination of Care  Total floor / unit time spent today 30 minutes  Greater than 50% of total time was spent with the patient and / or family counseling and / or coordination of care

## 2020-08-13 NOTE — H&P
[address] [facility]    [facility] 1950, 78 y o  [facility] MRN: [id number]    Unit/Bed#: S -01 Encounter: [id number]    Primary Care Provider: Karl Khan MD   Date and time admitted to hospital: 8/12/2020  5:01 PM        * Diarrhea  Assessment & Plan  · Patient presents with history of chronic diarrhea and rectal bleeding  Patient reports feeling fatigued  Patient reports 5-10 bowel movements per day  She reports brown color stools; however, patient reports siddhartha red blood related to hemorrhoids  Patient reports she recently put herself on Imodium  · Obtain C diff sample and stool enteric bacterial panel  · Contact precautions  · IV hydration  · Replete electrolytes  · GI consult  · Clear liquid diet  Hypokalemia  Assessment & Plan  · Potassium level upon admission: 2 9  Secondary to diarrhea  Magnesium level: Pending  Patient received 20 mEq of IV potassium in the ER  Replete and monitor BMP  Monitor on telemetry  Anemia  Assessment & Plan  · Present on admission, hemoglobin of 10 3  · Occult stools  · Iron panel pending  · Monitor CBC  · Continue ferrous sulfate  Alcohol use  Assessment & Plan  · Patient reports 3 glasses of wine daily  However, the patient reports she has not drink for the past week due to feeling a unwell related to diarrhea  · Monitor for signs symptoms of withdrawal; currently no signs or symptoms noted  Hyponatremia  Assessment & Plan   Sodium level upon admission: [address] secondary to dehydration related to chronic diarrhea   Treatment:  In a IV hydration   Monitor BMP  Anxiety disorder  Assessment & Plan  · PDMP reviewed  · Continue daily Prozac and Ativan p r n  Essential hypertension  Assessment & Plan   Blood pressure is reviewed and acceptable   o Last recorded pressure: 129/60   Patient currently not on any home antihypertensive medications   Monitor blood pressure      Irritable bowel disease  Assessment & Plan  · Per patient history of IBS  · Patient reports she has had EGD and colonoscopies in the past, last 1 was a couple years ago  Von Willebrand disease ([address] )  Assessment & Plan  · Continue outpatient follow-up with Dr Nura Troy of hematology  Asthma  Assessment & Plan  · Not in acute exacerbation  · Continue home medications  Paroxysmal atrial fibrillation (HCC)  Assessment & Plan  · Patient has a history of paroxysmal atrial fibrillation and flutter  · Status post ablation therapy with no subsequent episodes of AFib a flutter since February of 2019  · Continue flecainide  · Continue outpatient follow-up with cardiology  VTE Prophylaxis: Pharmacologic VTE Prophylaxis contraindicated due to Not indicated  / reason for no mechanical VTE prophylaxis Not indicated  Code Status:  Full  POLST: POLST form is not discussed and not completed at this time  Discussion with family:   Jenelle Alexander at bedside  Anticipated Length of Stay:  Patient will be admitted on an Observation basis with an anticipated length of stay of  < 2 midnights  Justification for Hospital Stay:  IV hydration, potassium replacement, telemetry monitoring, GI consult  Total Time for Visit, including Counseling / Coordination of Care: 1 hour  Greater than 50% of this total time spent on direct patient counseling and coordination of care  Chief Complaint:   Diarrhea    History of Present Illness:    Amarjit Rene is a 78 y o  female with a past medical history of asthma, anxiety, atrial fib/flutter with ablation in February 2019, IBS, hypertension and von Willebrand's disease who presents with diarrhea  Patient presents with complaints of chronic diarrhea, fatigue and racing her with ambulation    Patient reported that the last time she felt this way her "electrolyte were off "Patient reports that at outpatient PCP started her Questran; however, patient discontinued this medication herself due the red side effects of vitamin K and clotting interference  The patient then started herself on Imodium 4 days ago  Patient denies melena or hematochezia  She reports siddhartha red blood per rectum; reporting that she has a history of hemorrhoids  She denies nausea, vomiting, abdominal pain, fever or chills  She reports that she has had a EGD and colonoscopy in the past several years ago    Upon evaluation in the ER, she was found to have a potassium of 2 9  Patient will be admitted for electrolyte repletion, IV hydration, telemetry monitoring and gastroenterology consult  Review of Systems:    Review of Systems   Constitutional: Positive for fatigue  Negative for appetite change and chills  Respiratory: Negative for cough, chest tightness and shortness of breath  Cardiovascular: Positive for palpitations  Gastrointestinal: Positive for anal bleeding and diarrhea  Negative for abdominal pain, blood in stool, constipation, nausea, rectal pain and vomiting  Genitourinary: Negative for dysuria  Musculoskeletal: Negative for arthralgias, back pain and myalgias  Neurological: Positive for weakness (Generalized)  Negative for syncope and headaches  All other systems reviewed and are negative  Past Medical and Surgical History:     Past Medical History:   Diagnosis Date    Anxiety     Asthma     Atrial fibrillation (HCC)     Bowel obstruction (HCC)     Hypertension     PA[facility]ox[facility]mal [facility] fibrillation) [facility] )     Von Willebrand disease ([facility] )        Past Surgical History:   Procedure Laterality Date    BUNIONECTOMY      HEMORROIDECTOMY      HYSTERECTOMY      POLYPECTOMY         Meds/Allergies:    Prior to Admission medications    Medication Sig Start Date End Date Taking?  Authorizing Provider   albuterol (PROVENTIL HFA,VENTOLIN HFA) 90 mcg/act inhaler Inhale 2 puffs every 6 (six) hours as needed for wheezing    Historical Provider, MD   beclomethasone (QVAR) 40 MCG/ACT inhaler Inhale 2 puffs 2 (two) times a day    Historical Provider, MD   Cholecalciferol (VITAMIN D3) 1000 UNITS CAPS Take by mouth daily    Historical Provider, MD   cholestyramine (QUESTRAN) 4 g packet Take 1 packet by mouth daily    Historical Provider, MD   estradiol (ESTRACE) 1 mg tablet Take 1 mg by mouth 3 (three) times a week     Historical Provider, MD   famotidine (PEPCID) 20 mg tablet Take 1 tablet (20 mg total) by mouth every 12 (twelve) hours  Patient taking differently: Take 20 mg by mouth as needed  3/22/19   Natan Ashraf MD   ferrous sulfate 325 (65 Fe) mg tablet Take 325 mg by mouth as needed     Historical Provider, MD   flecainide (TAMBOCOR) 50 mg tablet Take 1 5 tablets (75 mg total) by mouth 2 (two) times a day 6/23/20   Yanelis Bejarano MD   FLUoxetine (PROzac) 40 MG capsule Take 40 mg by mouth daily      Historical Provider, MD   loperamide (IMODIUM A-D) 2 MG tablet Take 2 mg by mouth as needed for diarrhea    Historical Provider, MD   LORazepam (ATIVAN) 0 5 mg tablet Take 0 5 mg by mouth every 6 (six) hours as needed for anxiety    Historical Provider, MD   potassium chloride (K-DUR,KLOR-CON) 20 mEq tablet Take 1 tablet (20 mEq total) by mouth daily 6/23/20   Yanelis Bejarano MD     I have reviewed home medications with patient personally  Allergies:    Allergies   Allergen Reactions    Bactrim [Sulfamethoxazole-Trimethoprim]     Wellbutrin [Bupropion]        Social History:     Marital Status: /Civil Union   Occupation:  Unknown  Patient Pre-hospital Living Situation:  Private residence  Patient Pre-hospital Level of Mobility:  Independent  Patient Pre-hospital Diet Restrictions:  None  Substance Use History:   Social History     Substance and Sexual Activity   Alcohol Use Yes    Alcohol/week: 21 0 standard drinks    Types: 21 Glasses of wine per week     Social History     Tobacco Use   Smoking Status Former Smoker    Types: Cigarettes, Pipe, Cigars    Last attempt to quit: 2011    Years since quittin 6   Smokeless Tobacco Never Used   Tobacco Comment    1 pack/day for 5 years, and about 1/2 pack for 30 years     Social History     Substance and Sexual Activity   Drug Use No       Family History:    non-contributory    Physical Exam:     Vitals:   Blood Pressure: 129/60 (20)  Pulse: 81 (20)  Temperature: 98 2 °F (36 8 °C) (20)  Temp Source: Oral (20)  Respirations: 18 (20)  Height: 5' 5" (165 1 cm) (20)  Weight - Scale: 60 3 kg (132 lb 15 oz) (20)  SpO2: 100 % (20)    Physical Exam  Vitals signs and nursing note reviewed  Constitutional:       General: She is not in acute distress  Appearance: Normal appearance  She is not ill-appearing  HENT:      Head: Normocephalic and atraumatic  Eyes:      General: No scleral icterus  Conjunctiva/sclera: Conjunctivae normal    Cardiovascular:      Rate and Rhythm: Normal rate and regular rhythm  Pulses: Normal pulses  Heart sounds: Normal heart sounds  Pulmonary:      Effort: Pulmonary effort is normal       Breath sounds: Normal breath sounds  No wheezing, rhonchi or rales  Chest:      Chest wall: No tenderness  Abdominal:      General: Bowel sounds are normal  There is no distension  Tenderness: There is no abdominal tenderness  Musculoskeletal:         General: No swelling or tenderness  Skin:     General: Skin is warm and dry  Capillary Refill: Capillary refill takes 2 to 3 seconds  Coloration: Skin is pale  Neurological:      Mental Status: She is alert and oriented to person, place, and time  Psychiatric:         Speech: Speech normal          Additional Data:     Lab Results: I have personally reviewed pertinent reports        Results from last 7 days   Lab Units 20  1746   WBC Thousand/uL 5 77   HEMOGLOBIN g/dL 10 3*   HEMATOCRIT % 32 1*   PLATELETS Thousands/uL 358   BANDS PCT % 12*   LYMPHO PCT % 15   MONO PCT % 15*   EOS PCT % 1     Results from last 7 days   Lab Units 08/12/20  1746   SODIUM mmol/L 134*   POTASSIUM mmol/L 2 9*   CHLORIDE mmol/L 97*   CO2 mmol/L 25   BUN mg/dL 8   CREATININE mg/dL 1 12   ANION GAP mmol/L 12   CALCIUM mg/dL 8 9   ALBUMIN g/dL 3 6   TOTAL BILIRUBIN mg/dL 0 36   ALK PHOS U/L 125*   ALT U/L 32   AST U/L 25   GLUCOSE RANDOM mg/dL 111     Results from last 7 days   Lab Units 08/12/20  1746   INR  0 93                   Imaging: I have personally reviewed pertinent reports  No orders to display       EKG, Pathology, and Other Studies Reviewed on Admission:   · EKG:  Sinus rhythm with first-degree AV block, heart rate 79  [facility] Yes     ** Please Note: This note has been constructed using a voice recognition system   **

## 2020-08-13 NOTE — ASSESSMENT & PLAN NOTE
 Blood pressure is reviewed and acceptable   o Last recorded pressure: 129/60   Patient currently not on any home antihypertensive medications   Monitor blood pressure

## 2020-08-13 NOTE — ASSESSMENT & PLAN NOTE
Patient presents with history of chronic diarrhea and rectal bleeding  Patient reports feeling fatigued  Patient reports 5-10 bowel movements per day  She reports brown color stools; however, patient reports siddhartha red blood related to hemorrhoids  Patient reports she recently put herself on Imodium      Plan:  · C diff sample and stool enteric bacterial panel:  Negative  · GI consult and recommendations appreciated:  NPO at midnight for EGD tomorrow, follow up on celiac studies and inflammatory markers   · Trial of gluten free diet  · Continue Imodium for diarrhea as needed

## 2020-08-13 NOTE — ASSESSMENT & PLAN NOTE
· Present on admission, hemoglobin of 10 3  · Occult stools  · Iron panel pending  · Monitor CBC  · Continue ferrous sulfate

## 2020-08-13 NOTE — DISCHARGE INSTR - AVS FIRST PAGE
Dear Lola Mayfield,     It was our pleasure to care for you here at [facility]  It is our hope that we were always able to exceed the expected standards for your care during your stay  You were hospitalized due to diarrhea  You were cared for on the 4th floor by Popeye Driver MD under the service of Rhys Rey MD with the [facility] who covers for your primary care physician (PCP), Deborah Knight MD, while you were hospitalized  If you have any questions or concerns related to this hospitalization, you may contact us at [phone]  For follow up as well as any medication refills, we recommend that you follow up with your primary care physician  A registered nurse will reach out to you by phone within a few days after your discharge to answer any additional questions that you may have after going home  However, at this time we provide for you here, the most important instructions / recommendations at discharge:     · Notable Medication Adjustments -   · Continue Imodium for diarrhea as needed  · Continue 7 days of Flagyl which is antibiotics for colitis which is infection/inflammation of your colon  · Testing Required after Discharge -   · BMP in 1 week with potassium supplements and follow-up with the primary care doctor regarding results for further instructions accordingly  · Important follow up information -   · Follow-up with primary care doctor  · Follow-up with gastroenterology in clinic  · Other Instructions -   · Trial of gluten free diet, referr to discharge instruction for further details  · Please review this entire after visit summary as additional general instructions including medication list, appointments, activity, diet, any pertinent wound care, and other additional recommendations from your care team that may be provided for you        Sincerely,     Popeye Driver MD

## 2020-08-13 NOTE — ASSESSMENT & PLAN NOTE
· Potassium level upon admission: 2 9  Secondary to diarrhea  Magnesium level: 2 0  Patient received 20 mEq of IV potassium in the ER  Replete and monitor BMP  Monitor on telemetry

## 2020-08-13 NOTE — ASSESSMENT & PLAN NOTE
· Present on admission, hemoglobin of 10 3  · Occult stools: negative  · Iron panel unremarkable  · Monitor CBC    · Continue ferrous sulfate due to history of iron deficiency anemia  · Check b12, folate as mcv is 101

## 2020-08-13 NOTE — ASSESSMENT & PLAN NOTE
· Patient reports 3 glasses of wine daily  However, the patient reports she has not drink for the past week due to feeling a unwell related to diarrhea  · Monitor for signs symptoms of withdrawal; currently no signs or symptoms noted

## 2020-08-13 NOTE — ASSESSMENT & PLAN NOTE
· Patient has a history of paroxysmal atrial fibrillation and flutter  · Status post ablation therapy with no subsequent episodes of AFib a flutter since February of 2019  · Continue flecainide  · Continue outpatient follow-up with cardiology

## 2020-08-13 NOTE — ASSESSMENT & PLAN NOTE
 Sodium level upon admission: 134   Suspect secondary to dehydration related to chronic diarrhea   Treatment:  In a IV hydration   Monitor BMP

## 2020-08-13 NOTE — ASSESSMENT & PLAN NOTE
· Per patient history of IBS    · Patient reports she has had EGD and colonoscopies in the past, last colonoscopy was in 2016

## 2020-08-13 NOTE — DISCHARGE SUMMARY
Discharge- Artis Gann 1950, 78 y o  female MRN: [id number]    Unit/Bed#: S -01 Encounter: [id number]    Primary Care Provider: Karl Khan MD   Date and time admitted to hospital: 8/12/2020  5:01 PM        * Diarrhea  Assessment & Plan  Patient presents with history of chronic diarrhea and rectal bleeding  Patient reports feeling fatigued  Patient reports 5-10 bowel movements per day  She reports brown color stools; however, patient reports siddhartha red blood related to hemorrhoids  Patient reports she recently put herself on Imodium  Plan:  · C diff sample and stool enteric bacterial panel:  Negative  · GI consult and recommendations appreciated:  Patient to follow-up with GI in clinic, recommend EGD and celiac disease studies  · Trial of gluten free diet  · Continue Imodium for diarrhea as needed  · Will discharge with potassium supplements with repeat BMP in week and patient to follow-up with PCP for further instructions if needed    Alcohol use  Assessment & Plan  · Patient reports 3 glasses of wine daily  However, the patient reports she has not drink for the past week due to feeling a unwell related to diarrhea  · Monitor for signs symptoms of withdrawal; currently no signs or symptoms noted  Hyponatremia  Assessment & Plan   Improved      Anemia  Assessment & Plan  · Present on admission, hemoglobin of 10 3  · Occult stools: negative  · Iron panel unremarkable  · Monitor CBC  · Continue ferrous sulfate due to history of iron deficiency anemia  · Check b12, folate as mcv is 101    Hypokalemia  Assessment & Plan  Repleted, patient will be discharged with potassium supplements with BMP repeat in 1 week and follow-up with PCP regarding further instructions    Anxiety disorder  Assessment & Plan  · PDMP reviewed  · Continue daily Prozac and Ativan p r n  Essential hypertension  Assessment & Plan   Blood pressure is reviewed and acceptable     Patient currently not on any home antihypertensive medications  Irritable bowel disease  Assessment & Plan  · Per patient history of IBS  · Patient reports she has had EGD and colonoscopies in the past, last colonoscopy was in 2016    [facility]out Vincent disease [facility])  Assessment & Plan  · Continue outpatient follow-up with Dr Lilia Jensen of hematology  Asthma  Assessment & Plan  · Not in acute exacerbation  · Continue home medications  Paroxysmal atrial fibrillation (HCC)  Assessment & Plan  · Patient has a history of paroxysmal atrial fibrillation and flutter  · Status post ablation therapy with no subsequent episodes of AFib a flutter since February of 2019  · Continue flecainide  · Continue outpatient follow-up with cardiology  Discharging Resident Physician: Sherry Roberts MD  Attending: No att  providers found  PCP: Karl Khan MD  Admission Date: 8/12/2020  Discharge Date: 08/14/20    Disposition:     Home    Reason for Admission:  Diarrhea    Consultations During Hospital Stay:  · Gastroenterology    Procedures Performed:     · None    Significant Findings / Test Results:     · Anemia  · Hypokalemia, improved with replacement  · C diff negative  · Stool enteric bacteria negative  · CT abdomen showing colitis    Incidental Findings:   · None     Test Results Pending at Discharge (will require follow up):   · Celiac disease serology  · Follow-up on B12 and folate results     Outpatient Tests Requested:  · Patient to follow-up with GI who will facilitate an EGD and celiac studies    Complications:  None    Hospital Course:     Artis Gann is a 78 y o  female patient who originally presented to the hospital on 8/12/2020 due to diarrhea    Due to chronicity of patient's diarrhea which is mostly loose bowel movements with associated bloating and negative infectious disease workup, it was thought that patient's symptoms more consistent with celiac disease clinically as she reported response when she tried gluten free diet plus history of iron deficiency anemia therefore patient was seen by GI here with whome she will follow up in clinic to facilitate EGD to rule out H pylori and colonoscopy as well as patient had a CT scan abdomen in the hospital showing colitis for which patient will be discharged with 7 days of Flagyl and also follow-up on celiac disease lab workup  Electrolytes were repleted which were decreased secondary to diarrhea and patient will be discharged with potassium supplements with repeat of BMP in 1 week and to follow-up with primary care doctor for further instructions according to the results  Patient was advised to continue he will gluten free diet  Due to patient history of Von Willebrand disease, our team spoke hematology who recommended humate P to be given prior to GI procedure and relayed specific recommendations to the GI team   Patient is medically stable for discharge and she is agreeable to the above plan  Condition at Discharge: stable     Discharge Day Visit / Exam:     Subjective:  Patient still complains of loose bowel movements, denies any abdominal pain, nausea or vomiting  Vitals: Blood Pressure: 116/58 (08/14/20 0640)  Pulse: 83 (08/14/20 0640)  Temperature: 99 5 °F (37 5 °C) (08/14/20 0640)  Temp Source: Oral (08/14/20 0640)  Respirations: 16 (08/14/20 0640)  Height: 5' 5" (165 1 cm) (08/12/20 1930)  Weight - Scale: 60 3 kg (132 lb 15 oz) (08/12/20 1930)  SpO2: 98 % (08/14/20 0640)  Exam:   Physical Exam  Vitals signs and nursing note reviewed  HENT:      Head: Normocephalic  Mouth/Throat:      Mouth: Mucous membranes are moist       Pharynx: Oropharynx is clear  Eyes:      Conjunctiva/sclera: Conjunctivae normal       Pupils: Pupils are equal, round, and reactive to light  Cardiovascular:      Rate and Rhythm: Normal rate and regular rhythm  Heart sounds: Normal heart sounds  No murmur  Pulmonary:      Effort: Pulmonary effort is normal  No respiratory distress  Breath sounds: Normal breath sounds  No wheezing or rales  Abdominal:      General: Bowel sounds are normal       Palpations: Abdomen is soft  Musculoskeletal: Normal range of motion  Skin:     General: Skin is warm and dry  Capillary Refill: Capillary refill takes less than 2 seconds  Neurological:      Mental Status: She is alert and oriented to person, place, and time  Psychiatric:         Mood and Affect: Mood normal          Behavior: Behavior normal        Discussion with Family:  Discussed with patient at bedside    Discharge instructions/Information to patient and family:   See after visit summary for information provided to patient and family  Provisions for Follow-Up Care:  See after visit summary for information related to follow-up care and any pertinent home health orders  Planned Readmission:  None     Discharge Medications:  See after visit summary for reconciled discharge medications provided to patient and family        ** Please Note: This note has been constructed using a voice recognition system **

## 2020-08-13 NOTE — UTILIZATION REVIEW
Initial Clinical Review    OBSERVATION 8/12 @ 1910 - CHANGED TO INPATIENT ON 8/13/20 @ 1744    Admission: Date/Time/Statement:   Inpatient Admission Orders (From admission, onward)     Ordered        08/13/20 1744  Inpatient Admission  Once                   Orders Placed This Encounter   Procedures    Inpatient Admission     Standing Status:   Standing     Number of Occurrences:   1     Order Specific Question:   Admitting Physician     Answer:   Junior Bates [[id number]]     Order Specific Question:   Level of Care     Answer:   Med Surg [[id number]]     Order Specific Question:   Estimated length of stay     Answer:   More than 2 Midnights     Order Specific Question:   Certification     Answer:   I certify that inpatient services are medically necessary for this patient for a duration of greater than two midnights  See H&P and MD Progress Notes for additional information about the patient's course of treatment  ED Arrival Information     Expected Arrival Acuity Means of Arrival Escorted By Service Admission Type    - 8/12/2020 16:24 Urgent Walk-In Spouse Hospitalist Urgent    Arrival Complaint    DIARRHEA/DEHYDRATION        Chief Complaint   Patient presents with    Dehydration     "hx of chronic diarrhea unresolved and rectal bleeding"  pt feels fatigued and heart races when she goes up and down the stairs  reports last time she felt this way her electrolytes were off  no appetite     Assessment/Plan:   Ms Valeria Cervantes is a 77 yo female who presents to the ED from home with c/o diarrhea that is chronic, fatigue, racing with ambulation, siddhartha red blood from rectum r/t hemorrhoids per pt, generalized weakness  When she has felt like this in past her "electrolytes were off"  PCP put her on Questran but pt stopped it r/t side effects  Has been taking Imodium x 4 days  In ED she had K of 2 9, Na is 134    PMH: asthma, anxiety, atrial fib/flutter with ablation in February 2019, IBS, hypertension and von Odilon & Odilon disease  She is admitted to OBSERVATION status with Diarrhea - C diff, cl liquid diet,  IV fluids, GI Consult  Hypokalemia - repletion, Tele  Anemia - continue ferrous sulfate, check occult stools, Iron panel  Hyponatremia - monitor BMP       8/13 GI Consult -  intermittent constipation and diarrhea for many years  Episodes now tend to be more diarrhea, rarely constipation  Associated with bloating, nausea, abdominal pain which are relieved with BM  Considering imaging and scope results conveyed by the patient, very well could be IBS  Less likely to be infectious  Could be IBS, celiac, H  Pylori  PLAN: Diet: patient to eat as tolerance allows, Trial of gluten free diet  Reassess need of IVF if eating well  Stool studies pending - consider adding giardia  EGD outpatient - h pylori, celiac biopsies since patient has iron def  Anemia      8/13 PT TO HAVE EGD TODAY     ED Triage Vitals   Temperature Pulse Respirations Blood Pressure SpO2   08/12/20 1635 08/12/20 1634 08/12/20 1634 08/12/20 1634 08/12/20 1634   98 6 °F (37 °C) 94 18 152/60 100 %      Temp Source Heart Rate Source Patient Position - Orthostatic VS BP Location FiO2 (%)   08/12/20 1634 08/12/20 1634 08/12/20 1800 08/12/20 1634 --   Oral Monitor Sitting Left arm       Pain Score       08/12/20 1634       3          Wt Readings from Last 1 Encounters:   08/12/20 60 3 kg (132 lb 15 oz)     Additional Vital Signs:   08/13/20 0757   97 9 °F (36 6 °C)   76   16   130/60      100 %   None (Room air)   Lying    08/12/20 2200                     None (Room air)       08/12/20 2136   98 2 °F (36 8 °C)   81   18   129/60      100 %   None (Room air)   Lying    08/12/20 2107      74   16   145/72   91   98 %   None (Room air)   Lying    08/12/20 2030      74   16   143/68   98   100 %   None (Room air)   Sitting    08/12/20 2003      80   16   137/65      99 %   None (Room air)   Lying    08/12/20 1930      78   16   146/66   95   100 %   None (Room air)   Sitting    08/12/20 1800      74   16   130/60   87   100 %   None (Room air)   Sitting    08/12/20 1700                     None (Room air)         Pertinent Labs/Diagnostic Test Results:     8/12 ECG - NSR with non-specific T waves         Results from last 7 days   Lab Units 08/13/20  0621 08/12/20  1746   WBC Thousand/uL 5 27 5 77   HEMOGLOBIN g/dL 9 3* 10 3*   HEMATOCRIT % 30 1* 32 1*   PLATELETS Thousands/uL 315 358   BANDS PCT %  --  12*         Results from last 7 days   Lab Units 08/13/20  0621 08/12/20  1746   SODIUM mmol/L 141 134*   POTASSIUM mmol/L 3 1* 2 9*   CHLORIDE mmol/L 107 97*   CO2 mmol/L 22 25   ANION GAP mmol/L 12 12   BUN mg/dL 4* 8   CREATININE mg/dL 0 76 1 12   EGFR ml/min/1 73sq m 81 51   CALCIUM mg/dL 8 1* 8 9   MAGNESIUM mg/dL  --  2 0     Results from last 7 days   Lab Units 08/12/20  1746   AST U/L 25   ALT U/L 32   ALK PHOS U/L 125*   TOTAL PROTEIN g/dL 8 0   ALBUMIN g/dL 3 6   TOTAL BILIRUBIN mg/dL 0 36     Results from last 7 days   Lab Units 08/13/20  0621 08/12/20  1746   GLUCOSE RANDOM mg/dL 79 111     Results from last 7 days   Lab Units 08/12/20  1746   TROPONIN I ng/mL <0 02         Results from last 7 days   Lab Units 08/12/20  1746   PROTIME seconds 12 6   INR  0 93   PTT seconds 26     Results from last 7 days   Lab Units 08/12/20  1746   FERRITIN ng/mL 47         Results from last 7 days   Lab Units 08/12/20  1746   LIPASE u/L 71*     ED Treatment:   Medication Administration from 08/12/2020 1623 to 08/12/2020 2123    Date/Time Order Dose Route Action   08/12/2020 1750 sodium chloride 0 9 % bolus 1,000 mL 1,000 mL Intravenous New Bag   08/12/2020 2100 potassium chloride 20 mEq IVPB (premix)   Intravenous Restarted   08/12/2020 1925 potassium chloride 20 mEq IVPB (premix) 20 mEq Intravenous New Bag   08/12/2020 2058 sodium chloride 0 9 % infusion 100 mL/hr Intravenous New Bag        Past Medical History:   Diagnosis Date    Anxiety     Asthma     Atrial fibrillation (HCC)     Bowel obstruction ([facility] )     Hypertension     PAF (paroxysmal atrial fibrillation) (HCC)     Von Willebrand disease ([place] [address] )      Present on Admission:   Paroxysmal atrial fibrillation (HCC)   Essential hypertension   Anxiety disorder   Irritable bowel disease   Von Willebrand disease ([place] [address] )    Admitting Diagnosis: Diarrhea [R19 7]  Dehydration [E86 0]  Hypokalemia [E87 6]     Age/Sex: 78 y o  female     Admission Orders:  Scheduled Medications:  cholecalciferol, 1,000 Units, Oral, Daily  [START ON 8/14/2020] estradiol, 1 mg, Oral, Once per day on Mon Wed Fri  famotidine, 20 mg, Oral, Q12H  ferrous sulfate, 325 mg, Oral, Daily With Breakfast  flecainide, 75 mg, Oral, BID  FLUoxetine, 40 mg, Oral, Daily  fluticasone, 2 puff, Inhalation, Q12H [facility]  potassium chloride, 40 mEq, Oral, Once      Continuous IV Infusions:  sodium chloride, 100 mL/hr, Intravenous, Continuous      PRN Meds:  acetaminophen, 650 mg, Oral, Q6H PRN  albuterol, 2 puff, Inhalation, Q6H PRN  LORazepam, 0 5 mg, Oral, Q8H PRN    Tele   SCDs  Activity as mala   Ambulate q shift   Stool studies  Diet cl liq  IP CONSULT TO GASTROENTEROLOGY      Network Utilization Review Department  [email or web address]  ATTENTION: Please call with any questions or concerns to [phone] and carefully listen to the prompts so that you are directed to the right person  All voicemails are confidential   Kathrine Grand all requests for admission clinical reviews, approved or denied determinations and any other requests to dedicated fax number below belonging to the campus where the patient is receiving treatment   List of dedicated fax numbers for the Facilities:  FACILITY NAME UR FAX NUMBER   ADMISSION DENIALS (Administrative/Medical Necessity) [phone]   [address] (Maternity/NICU/Pediatrics) [phone]   [place] [address] [address] [facility] [address] [facility] [phone]   [address] [facility] [phone]   [facility] [address] [phone]   [address] [facility]  [phone] [address] [phone]

## 2020-08-13 NOTE — PROGRESS NOTES
Progress Note - Diana Metzger 1950, 78 y o  female MRN: [id number]    Unit/Bed#: S -01 Encounter: [id number]    Primary Care Provider: Tia Demarco MD   Date and time admitted to hospital: 8/12/2020  5:01 PM        * Diarrhea  Assessment & Plan  Patient presents with history of chronic diarrhea and rectal bleeding  Patient reports feeling fatigued  Patient reports 5-10 bowel movements per day  She reports brown color stools; however, patient reports siddhartha red blood related to hemorrhoids  Patient reports she recently put herself on Imodium  Plan:  · C diff sample and stool enteric bacterial panel:  Negative  · GI consult and recommendations appreciated:  NPO at midnight for EGD tomorrow, follow up on celiac studies and inflammatory markers   · Trial of gluten free diet  · Continue Imodium for diarrhea as needed    Alcohol use  Assessment & Plan  · Patient reports 3 glasses of wine daily  However, the patient reports she has not drink for the past week due to feeling a unwell related to diarrhea  · Monitor for signs symptoms of withdrawal; currently no signs or symptoms noted  Anemia  Assessment & Plan  · Present on admission, hemoglobin of 10 3  · Occult stools: negative  · Iron panel unremarkable  · Monitor CBC  · Continue ferrous sulfate due to history of iron deficiency anemia  · Check b12, folate as mcv is 101    Anxiety disorder  Assessment & Plan  · PDMP reviewed  · Continue daily Prozac and Ativan p r n  Essential hypertension  Assessment & Plan   Blood pressure is reviewed and acceptable   o Last recorded pressure: 129/60   Patient currently not on any home antihypertensive medications  Irritable bowel disease  Assessment & Plan  · Per patient history of IBS    · Patient reports she has had EGD and colonoscopies in the past, last colonoscopy was in 2016    Von Willebrand disease [facility])  Assessment & Plan  · Continue outpatient follow-up with Dr Matthew Goss of hematology  Asthma  Assessment & Plan  · Not in acute exacerbation  · Continue home medications  Paroxysmal atrial fibrillation (HCC)  Assessment & Plan  · Patient has a history of paroxysmal atrial fibrillation and flutter  · Status post ablation therapy with no subsequent episodes of AFib a flutter since 2019  · Continue flecainide  · Continue outpatient follow-up with cardiology  Hyponatremiaresolved as of 2020  Assessment & Plan   Improved      Hypokalemiaresolved as of 2020  Assessment & Plan  Repleted  Monitor bmp and replete electrolytes as indicated           VTE Pharmacologic Prophylaxis:   Pharmacologic: Enoxaparin (Lovenox)  Mechanical VTE Prophylaxis in Place: Yes    Discussions with Specialists or Other Care Team Provider: Nursing, case management, GI     Education and Discussions with Family / Patient: Patient and her  at bedside     Current Length of Stay: 0 day(s)    Current Patient Status: Inpatient     Discharge Plan / Estimated Discharge Date: likely within 24-48 hours pending EGD tomorrow     Code Status: Level 1 - Full Code      Subjective:   Patient still complains of loose stool 3 times since last night  Denies abdominal pain, nausea or vomiting    Objective:     Vitals:   Temp (24hrs), Av 1 °F (36 7 °C), Min:97 9 °F (36 6 °C), Max:98 2 °F (36 8 °C)    Temp:  [97 9 °F (36 6 °C)-98 2 °F (36 8 °C)] 97 9 °F (36 6 °C)  HR:  [74-81] 76  Resp:  [16-18] 16  BP: (129-146)/(60-72) 130/60  SpO2:  [98 %-100 %] 100 %  Body mass index is 22 12 kg/m²  Input and Output Summary (last 24 hours): Intake/Output Summary (Last 24 hours) at 2020 1748  Last data filed at 2020 1664  Gross per 24 hour   Intake 2720 ml   Output 1200 ml   Net 1520 ml       Physical Exam:     Physical Exam  Vitals signs and nursing note reviewed  Constitutional:       Appearance: Normal appearance  HENT:      Head: Normocephalic and atraumatic     Eyes: General: No scleral icterus  Conjunctiva/sclera: Conjunctivae normal       Pupils: Pupils are equal, round, and reactive to light  Cardiovascular:      Rate and Rhythm: Normal rate and regular rhythm  Heart sounds: Normal heart sounds  Abdominal:      General: Bowel sounds are normal  There is no distension  Palpations: Abdomen is soft  Tenderness: There is no abdominal tenderness  Musculoskeletal: Normal range of motion  Skin:     General: Skin is warm and dry  Capillary Refill: Capillary refill takes less than 2 seconds  Neurological:      Mental Status: She is alert and oriented to person, place, and time  Cranial Nerves: No cranial nerve deficit  Psychiatric:         Mood and Affect: Mood normal          Behavior: Behavior normal            Additional Data:     Labs:    Results from last 7 days   Lab Units 08/13/20  0621 08/12/20  1746   WBC Thousand/uL 5 27 5 77   HEMOGLOBIN g/dL 9 3* 10 3*   HEMATOCRIT % 30 1* 32 1*   PLATELETS Thousands/uL 315 358   LYMPHO PCT %  --  15   MONO PCT %  --  15*   EOS PCT %  --  1     Results from last 7 days   Lab Units 08/13/20  0621 08/12/20  1746   POTASSIUM mmol/L 3 1* 2 9*   CHLORIDE mmol/L 107 97*   CO2 mmol/L 22 25   BUN mg/dL 4* 8   CREATININE mg/dL 0 76 1 12   CALCIUM mg/dL 8 1* 8 9   ALK PHOS U/L  --  125*   ALT U/L  --  32   AST U/L  --  25     Results from last 7 days   Lab Units 08/12/20  1746   INR  0 93       * I Have Reviewed All Lab Data Listed Above  * Additional Pertinent Lab Tests Reviewed:  [facility]    Imaging:    Imaging Reports Reviewed Today Include: none  Imaging Personally Reviewed by Myself Includes:  none    Recent Cultures (last 7 days):     Results from last 7 days   Lab Units 08/12/20  2328   C DIFF TOXIN B  Negative       Last 24 Hours Medication List:   Current Facility-Administered Medications   Medication Dose Route Frequency Provider Last Rate    acetaminophen 650 mg Oral Q6H PRN Ty Sport, CRNP      albuterol  2 puff Inhalation Q6H PRN Ty Sport, CRNP      cholecalciferol  1,000 Units Oral Daily PlastiPure, CRNP      [START ON 8/14/2020] estradiol  1 mg Oral Once per day on Mon Wed Fri Ty Sport, CRNP      famotidine  20 mg Oral Q12H Ty Sport, CRNP      ferrous sulfate  325 mg Oral Daily With Breakfast Ty Sport, CRNP      flecainide  75 mg Oral BID Ty Sport, CRNP      FLUoxetine  40 mg Oral Daily Ty Sport, CRNP      fluticasone  2 puff Inhalation Q12H [address] Mirela Ghent, CRNP      loperamide  2 mg Oral 4x Daily PRN John Becerra MD      LORazepam  0 5 mg Oral Q8H PRN Ty Sport, CRNP      sodium chloride  100 mL/hr Intravenous Continuous Ty Sport, CRNP 100 mL/hr (08/13/20 0030)        Today, Patient Was Seen By: Brett Magallon MD    ** Please Note: This note has been constructed using a voice recognition system   **

## 2020-08-13 NOTE — ASSESSMENT & PLAN NOTE
· Patient presents with history of chronic diarrhea and rectal bleeding  Patient reports feeling fatigued  Patient reports 5-10 bowel movements per day  She reports brown color stools; however, patient reports siddhartha red blood related to hemorrhoids  Patient reports she recently put herself on Imodium  · Obtain C diff sample and stool enteric bacterial panel  · Contact precautions  · IV hydration  · Replete electrolytes  · GI consult  · Clear liquid diet

## 2020-08-13 NOTE — ASSESSMENT & PLAN NOTE
· Per patient history of IBS  · Patient reports she has had EGD and colonoscopies in the past, last 1 was a couple years ago

## 2020-08-14 VITALS
DIASTOLIC BLOOD PRESSURE: 58 MMHG | WEIGHT: 132.94 LBS | HEART RATE: 83 BPM | RESPIRATION RATE: 16 BRPM | SYSTOLIC BLOOD PRESSURE: 116 MMHG | TEMPERATURE: 99.5 F | HEIGHT: 65 IN | BODY MASS INDEX: 22.15 KG/M2 | OXYGEN SATURATION: 98 %

## 2020-08-14 LAB
ANION GAP SERPL CALCULATED.3IONS-SCNC: 10 MMOL/L (ref 4–13)
ATRIAL RATE: 81 BPM
BUN SERPL-MCNC: 4 MG/DL (ref 5–25)
CALCIUM SERPL-MCNC: 7.7 MG/DL (ref 8.3–10.1)
CHLORIDE SERPL-SCNC: 109 MMOL/L (ref 100–108)
CO2 SERPL-SCNC: 20 MMOL/L (ref 21–32)
CREAT SERPL-MCNC: 0.74 MG/DL (ref 0.6–1.3)
ERYTHROCYTE [DISTWIDTH] IN BLOOD BY AUTOMATED COUNT: 18.3 % (ref 11.6–15.1)
FOLATE SERPL-MCNC: 14.2 NG/ML (ref 3.1–17.5)
GFR SERPL CREATININE-BSD FRML MDRD: 84 ML/MIN/1.73SQ M
GLUCOSE SERPL-MCNC: 96 MG/DL (ref 65–140)
HCT VFR BLD AUTO: 27.5 % (ref 34.8–46.1)
HGB BLD-MCNC: 8.4 G/DL (ref 11.5–15.4)
MCH RBC QN AUTO: 30.9 PG (ref 26.8–34.3)
MCHC RBC AUTO-ENTMCNC: 30.5 G/DL (ref 31.4–37.4)
MCV RBC AUTO: 101 FL (ref 82–98)
P AXIS: 65 DEGREES
PLATELET # BLD AUTO: 282 THOUSANDS/UL (ref 149–390)
PMV BLD AUTO: 8.9 FL (ref 8.9–12.7)
POTASSIUM SERPL-SCNC: 3.4 MMOL/L (ref 3.5–5.3)
PR INTERVAL: 216 MS
QRS AXIS: 52 DEGREES
QRSD INTERVAL: 90 MS
QT INTERVAL: 352 MS
QTC INTERVAL: 403 MS
RBC # BLD AUTO: 2.72 MILLION/UL (ref 3.81–5.12)
SODIUM SERPL-SCNC: 139 MMOL/L (ref 136–145)
T WAVE AXIS: 38 DEGREES
VENTRICULAR RATE: 79 BPM
VIT B12 SERPL-MCNC: 1008 PG/ML (ref 100–900)
WBC # BLD AUTO: 6.34 THOUSAND/UL (ref 4.31–10.16)

## 2020-08-14 PROCEDURE — NC001 PR NO CHARGE: Performed by: INTERNAL MEDICINE

## 2020-08-14 PROCEDURE — 85027 COMPLETE CBC AUTOMATED: CPT | Performed by: INTERNAL MEDICINE

## 2020-08-14 PROCEDURE — 82607 VITAMIN B-12: CPT | Performed by: INTERNAL MEDICINE

## 2020-08-14 PROCEDURE — 80048 BASIC METABOLIC PNL TOTAL CA: CPT | Performed by: INTERNAL MEDICINE

## 2020-08-14 PROCEDURE — 99239 HOSP IP/OBS DSCHRG MGMT >30: CPT | Performed by: INTERNAL MEDICINE

## 2020-08-14 PROCEDURE — 86255 FLUORESCENT ANTIBODY SCREEN: CPT | Performed by: INTERNAL MEDICINE

## 2020-08-14 PROCEDURE — 83516 IMMUNOASSAY NONANTIBODY: CPT | Performed by: INTERNAL MEDICINE

## 2020-08-14 PROCEDURE — 82784 ASSAY IGA/IGD/IGG/IGM EACH: CPT | Performed by: INTERNAL MEDICINE

## 2020-08-14 PROCEDURE — 82746 ASSAY OF FOLIC ACID SERUM: CPT | Performed by: INTERNAL MEDICINE

## 2020-08-14 RX ORDER — METRONIDAZOLE 500 MG/1
500 TABLET ORAL EVERY 8 HOURS SCHEDULED
Qty: 21 TABLET | Refills: 0 | Status: SHIPPED | OUTPATIENT
Start: 2020-08-14 | End: 2020-08-21

## 2020-08-14 RX ORDER — POTASSIUM CHLORIDE 20 MEQ/1
40 TABLET, EXTENDED RELEASE ORAL DAILY
Qty: 90 TABLET | Refills: 0 | Status: SHIPPED | OUTPATIENT
Start: 2020-08-14 | End: 2021-08-04 | Stop reason: SDUPTHER

## 2020-08-14 RX ORDER — POTASSIUM CHLORIDE 20 MEQ/1
40 TABLET, EXTENDED RELEASE ORAL ONCE
Status: COMPLETED | OUTPATIENT
Start: 2020-08-14 | End: 2020-08-14

## 2020-08-14 RX ADMIN — FLECAINIDE ACETATE 75 MG: 50 TABLET ORAL at 09:04

## 2020-08-14 RX ADMIN — FLUTICASONE PROPIONATE 2 PUFF: 44 AEROSOL, METERED RESPIRATORY (INHALATION) at 08:58

## 2020-08-14 RX ADMIN — FERROUS SULFATE TAB 325 MG (65 MG ELEMENTAL FE) 325 MG: 325 (65 FE) TAB at 08:58

## 2020-08-14 RX ADMIN — FAMOTIDINE 20 MG: 20 TABLET, FILM COATED ORAL at 09:01

## 2020-08-14 RX ADMIN — ESTRADIOL 1 MG: 1 TABLET ORAL at 10:12

## 2020-08-14 RX ADMIN — POTASSIUM CHLORIDE 40 MEQ: 1500 TABLET, EXTENDED RELEASE ORAL at 06:44

## 2020-08-14 RX ADMIN — FLUOXETINE 40 MG: 20 CAPSULE ORAL at 08:58

## 2020-08-14 RX ADMIN — Medication 1000 UNITS: at 08:58

## 2020-08-14 RX ADMIN — SODIUM CHLORIDE 100 ML/HR: 0.9 INJECTION, SOLUTION INTRAVENOUS at 05:17

## 2020-08-14 NOTE — PROGRESS NOTES
Progress Note - GI   Amarjit Rene 78 y o  female MRN: [id number]  Unit/Bed#: S -01 Encounter: [id number]    1  Diarrhea with hematochezia, h/o hemorrhoids   Patient has history of intermittent constipation and diarrhea for many years  Episodes now tend to be more diarrhea, rarely constipation  Associated with bloating, nausea, abdominal pain which are relieved with BM  Considering imaging and scope results conveyed by the patient, very well could be IBS  Last colonoscopy  - results not able to be found, but based on note unimpressive (1 polyp removed as per patient, colonic diverticulosis)  Less likely to be infectious/IBD  Could be IBS, celiac, H  Pylori  CRP elevated at 31 9  CT AP showing mild thickening of descending colon, possible infectious/inflammatory colitis  Patient states she's had 10BMs since last night, hematochezia restarted  Plan:  · Diet: patient to eat as tolerance allows, Trial of gluten free diet  · Stool studies pending - giardia, fecal calprotectin  · Celiac panel pending  · Start 7 day course of metronidazole due to CT findings, and if this was 2/2 giardia, will benefit  · Start daily fiber (metamucil), and PRN imodium QID  · EGD and colonoscopy outpatient - preferably after Hemonc recommendations for Huamte P, patient cleared from GI standpoint for discharge  Office will reach out to schedule procedures  Due to needs before procedure, scopes will be in hospital      VTE Pharmacologic Prophylaxis:   Pharmacologic: low VTE  Mechanical VTE Prophylaxis in Place: No  Current Length of Stay: 1 day(s)  Current Patient Status: Inpatient   Code Status: Level 1 - Full Code      Subjective:   Patient states had 10 Bms since last night - watery with some blood  She mentions feeling "woozy" after eating every time  No other ROS positive       Objective:     Vitals:   Temp (24hrs), Av 4 °F (36 9 °C), Min:97 7 °F (36 5 °C), Max:99 5 °F (37 5 °C)    Temp:  [97 7 °F (36 5 °C)-99 5 °F (37 5 °C)] 99 5 °F (37 5 °C)  HR:  [78-84] 83  Resp:  [16-18] 16  BP: (116-140)/(51-64) 116/58  SpO2:  [98 %-100 %] 98 %  Body mass index is 22 12 kg/m²  Input and Output Summary (last 24 hours): Intake/Output Summary (Last 24 hours) at 8/14/2020 0855  Last data filed at 8/14/2020 0519  Gross per 24 hour   Intake 3685 ml   Output 1400 ml   Net 2285 ml       Physical Exam:     Physical Exam  Vitals signs and nursing note reviewed  Constitutional:       Appearance: Normal appearance  She is normal weight  HENT:      Head: Normocephalic and atraumatic  Eyes:      Extraocular Movements: Extraocular movements intact  Cardiovascular:      Rate and Rhythm: Normal rate and regular rhythm  Pulses: Normal pulses  Heart sounds: Normal heart sounds  Pulmonary:      Effort: Pulmonary effort is normal       Breath sounds: Normal breath sounds  Abdominal:      General: Abdomen is flat  Bowel sounds are normal  There is no distension  Tenderness: There is no abdominal tenderness  Musculoskeletal:         General: No swelling or tenderness  Skin:     General: Skin is warm and dry  Neurological:      General: No focal deficit present  Mental Status: She is alert and oriented to person, place, and time  Mental status is at baseline  Psychiatric:         Mood and Affect: Mood normal          Behavior: Behavior normal          Thought Content: Thought content normal          Judgment: Judgment normal          Additional Data:     Labs:    Results from last 7 days   Lab Units 08/14/20  0516  08/12/20  1746   WBC Thousand/uL 6 34   < > 5 77   HEMOGLOBIN g/dL 8 4*   < > 10 3*   HEMATOCRIT % 27 5*   < > 32 1*   PLATELETS Thousands/uL 282   < > 358   LYMPHO PCT %  --   --  15   MONO PCT %  --   --  15*   EOS PCT %  --   --  1    < > = values in this interval not displayed       Results from last 7 days   Lab Units 08/14/20  0516  08/12/20  1746   POTASSIUM mmol/L 3 4*   < > 2 9*   CHLORIDE mmol/L 109*   < > 97*   CO2 mmol/L 20*   < > 25   BUN mg/dL 4*   < > 8   CREATININE mg/dL 0 74   < > 1 12   CALCIUM mg/dL 7 7*   < > 8 9   ALK PHOS U/L  --   --  125*   ALT U/L  --   --  32   AST U/L  --   --  25    < > = values in this interval not displayed  Results from last 7 days   Lab Units 08/12/20  1746   INR  0 93       * I Have Reviewed All Lab Data Listed Above  * Additional Pertinent Lab Tests Reviewed: [facility] [facility] Admission Reviewed    Imaging:    Imaging Reports Reviewed Today Include: CT AP  Imaging Personally Reviewed by Myself Includes:  none    Recent Cultures (last 7 days):     Results from last 7 days   Lab Units 08/12/20  2328   C DIFF TOXIN B  Negative       Last 24 Hours Medication List:   Current Facility-Administered Medications   Medication Dose Route Frequency Provider Last Rate    acetaminophen  650 mg Oral Q6H PRN Freedman Alex, CRNP      albuterol  2 puff Inhalation Q6H PRN Freedmanbabak Johnson, CRNP      cholecalciferol  1,000 Units Oral Daily Freedman Hung, CRNP      estradiol  1 mg Oral Once per day on Mon Wed Fri Freedmanbabak Jonhson, CRNP      famotidine  20 mg Oral Q12H Freedman Hung, CRNP      ferrous sulfate  325 mg Oral Daily With Breakfast Freedman Hung, CRNP      flecainide  75 mg Oral BID Freedman Hung, CRNP      FLUoxetine  40 mg Oral Daily Freedman Hung, CRNP      fluticasone  2 puff Inhalation Q12H [facility] Mirela Gonzalez, CRNP      loperamide  2 mg Oral 4x Daily PRN Marlene Holland MD      LORazepam  0 5 mg Oral Q8H PRN Tano Johnson, SUSAN          Today, Patient Was Seen By: Carl Winston MD    ** Please Note: This note has been constructed using a voice recognition system   **

## 2020-08-14 NOTE — UTILIZATION REVIEW
Initial Clinical Review    OBSERVATION 8/12 @ 1910 - CHANGED TO INPATIENT ON 8/13/20 @ 1744  RE: PATIENT NEEDS > 2 MIDNIGHT STAY DUE TO ONGOING DIARRHEA WITH NEED FOR ONGOING GI WORKUP AND IVF    Admission: Date/Time/Statement:   Inpatient Admission Orders (From admission, onward)     Ordered        08/13/20 1744  Inpatient Admission  Once                   Orders Placed This Encounter   Procedures    Inpatient Admission     Standing Status:   Standing     Number of Occurrences:   1     Order Specific Question:   Admitting Physician     Answer:   Margarita Munoz [[id number]]     Order Specific Question:   Level of Care     Answer:   Med Surg [16]     Order Specific Question:   Estimated length of stay     Answer:   More than 2 Midnights     Order Specific Question:   Certification     Answer:   I certify that inpatient services are medically necessary for this patient for a duration of greater than two midnights  See H&P and MD Progress Notes for additional information about the patient's course of treatment  ED Arrival Information     Expected Arrival Acuity Means of Arrival Escorted By Service Admission Type    - 8/12/2020 16:24 Urgent Walk-In Spouse Hospitalist Urgent    Arrival Complaint    DIARRHEA/DEHYDRATION        Chief Complaint   Patient presents with    Dehydration     "hx of chronic diarrhea unresolved and rectal bleeding"  pt feels fatigued and heart races when she goes up and down the stairs  reports last time she felt this way her electrolytes were off  no appetite     Assessment/Plan: This is a 78year old female from home to ED admitted to observation [address] due to diarrhea/hypokalmeia/anemia  history of Von Willebrand disease, asthma, PAF post ablation 2019, IBS, hypertension and anxiety  Presented due to  Feelings of dehydration, has been taking imodium for diarrhea for last 4 days, prior was on polystymine   Has chronic diarrhea, describes stools as brown with siddhartha red blood  Patient with 6 to 7 episodes daily, has stools with eating  On exam numerous large external hemorrhoids, guaiac positive stool  H&H 10 3/32  1   K 2 9  In the ED given IVF  1 liter bolus, total of KCL 40 meq  Plan is clears, contact precautions with stool C diff, IV hydration, replete electrolytes and consult GI    On 8/13/2020 changed to inpatient:   Patient with continued diarrhea  3 loose stools overnight  Stool enteric bacterial panel and C diff negative  To advance to gluten free diet  Continue imodium as needed  IVF continue  Admits to 3 glasses of wine daily, no signs of withdrawal and has not taken in last week  Stools occult blood negative continue iron  On 8/13/2020 per GI - Patient with diarrhea and hematochezia  History of Von Willebrand disease  Plan is regular diet  check CRP, celiac dis panel, fecal calprotectin  EGD tomorrow, CT abdomen    On 8/14/2020 patient with 10 BM since last night and hematochezia restarted  CT shows distal thickening of descending colon, possible colitis  Celiac panel pending  To start metronidazole, daily fiber, prn imodium qid    Will have EGD and colonoscopy as outpatient        ED Triage Vitals   Temperature Pulse Respirations Blood Pressure SpO2   08/12/20 1635 08/12/20 1634 08/12/20 1634 08/12/20 1634 08/12/20 1634   98 6 °F (37 °C) 94 18 152/60 100 %      Temp Source Heart Rate Source Patient Position - Orthostatic VS BP Location FiO2 (%)   08/12/20 1634 08/12/20 1634 08/12/20 1800 08/12/20 1634 --   Oral Monitor Sitting Left arm       Pain Score       08/12/20 1634       3          Wt Readings from Last 1 Encounters:   08/12/20 60 3 kg (132 lb 15 oz)     Additional Vital Signs:   08/13/20 0757   97 9 °F (36 6 °C)   76   16   130/60      100 %   None (Room air)   Lying    08/12/20 2200                     None (Room air)       08/12/20 2136   98 2 °F (36 8 °C)   81   18   129/60      100 %   None (Room air)   Lying    08/12/20 2107    74   16   145/72   91   98 %   None (Room air)   Lying    08/12/20 2030      74   16   143/68   98   100 %   None (Room air)   Sitting    08/12/20 2003      80   16   137/65      99 %   None (Room air)   Lying    08/12/20 1930      78   16   146/66   95   100 %   None (Room air)   Sitting    08/12/20 1800      74   16   130/60   87   100 %   None (Room air)   Sitting    08/12/20 1700                     None (Room air)         Pertinent Labs/Diagnostic Test Results:   8/14/2020 CT abdomen - Questional mild mural thickening of the descending colon which may reflect an infectious or inflammatory colitis  8/12 ECG - NSR with non-specific T waves         Results from last 7 days   Lab Units 08/14/20  0516 08/13/20 0621 08/12/20  1746   WBC Thousand/uL 6 34 5 27 5 77   HEMOGLOBIN g/dL 8 4* 9 3* 10 3*   HEMATOCRIT % 27 5* 30 1* 32 1*   PLATELETS Thousands/uL 282 315 358   BANDS PCT %  --   --  12*         Results from last 7 days   Lab Units 08/14/20  0516 08/13/20  0621 08/12/20  1746   SODIUM mmol/L 139 141 134*   POTASSIUM mmol/L 3 4* 3 1* 2 9*   CHLORIDE mmol/L 109* 107 97*   CO2 mmol/L 20* 22 25   ANION GAP mmol/L 10 12 12   BUN mg/dL 4* 4* 8   CREATININE mg/dL 0 74 0 76 1 12   EGFR ml/min/1 73sq m 84 81 51   CALCIUM mg/dL 7 7* 8 1* 8 9   MAGNESIUM mg/dL  --   --  2 0     Results from last 7 days   Lab Units 08/12/20  1746   AST U/L 25   ALT U/L 32   ALK PHOS U/L 125*   TOTAL PROTEIN g/dL 8 0   ALBUMIN g/dL 3 6   TOTAL BILIRUBIN mg/dL 0 36     Results from last 7 days   Lab Units 08/14/20  0516 08/13/20  0621 08/12/20  1746   GLUCOSE RANDOM mg/dL 96 79 111     Results from last 7 days   Lab Units 08/12/20  1746   TROPONIN I ng/mL <0 02         Results from last 7 days   Lab Units 08/12/20  1746   PROTIME seconds 12 6   INR  0 93   PTT seconds 26     Results from last 7 days   Lab Units 08/12/20  1746   FERRITIN ng/mL 47         Results from last 7 days   Lab Units 08/12/20  1746   LIPASE u/L 71* ED Treatment:   Medication Administration from 08/12/2020 1623 to 08/12/2020 2123    Date/Time Order Dose Route Action   08/12/2020 1750 sodium chloride 0 9 % bolus 1,000 mL 1,000 mL Intravenous New Bag   08/12/2020 2100 potassium chloride 20 mEq IVPB (premix)   Intravenous Restarted   08/12/2020 1925 potassium chloride 20 mEq IVPB (premix) 20 mEq Intravenous New Bag   08/12/2020 2058 sodium chloride 0 9 % infusion 100 mL/hr Intravenous New Bag        Past Medical History:   Diagnosis Date    Anxiety     Asthma     At[facility] fibrillation ([facility] )     [facility]     Hypertension     PA[facility]oxysmal [facility] fibrillation) [facility] )     Von Willebrand disease ([facility] )      Present on Admission:   Paroxysmal atrial fibrillation (HCC)   Essential hypertension   Anxiety disorder   Irritable bowel disease   Von Willebrand disease ([facility] )    Admitting Diagnosis: Diarrhea [R19 7]  Dehydration [E86 0]  Hypokalemia [E87 6]     Age/Sex: 78 y o  female     Admission Orders: 8/12/2020 1910 observation and CHANGED 8/13/2020 1744 INPATIENT   Scheduled Medications:  cholecalciferol, 1,000 Units, Oral, Daily  estradiol, 1 mg, Oral, Once per day on Mon Wed Fri  famotidine, 20 mg, Oral, Q12H  ferrous sulfate, 325 mg, Oral, Daily With Breakfast  flecainide, 75 mg, Oral, BID  FLUoxetine, 40 mg, Oral, Daily  fluticasone, 2 puff, Inhalation, Q12H IRMA  potassium chloride (K-DUR,KLOR-CON) CR tablet 40 mEq  - given on 8/12, 13 and 14  Dose: 40 mEq  Freq:  Once Route: PO    Continuous IV Infusions:sodium chloride 0 9 % infusion    Rate: 100 mL/hr Dose: 100 mL/hr  Freq: Continuous Route: IV  Indications of Use: IV Hydration  Last Dose: Stopped (08/14/20 0645)  Start: 08/12/20 2045 End: 08/14/20 4861      PRN Meds: not used   acetaminophen, 650 mg, Oral, Q6H PRN  albuterol, 2 puff, Inhalation, Q6H PRN  loperamide, 2 mg, Oral, 4x Daily PRN  LORazepam, 0 5 mg, Oral, Q8H PRN    Tele   SCDs  Activity as mala   Ambulate q shift   Stool studies  Diet cl liq  IP CONSULT TO GASTROENTEROLOGY      Network Utilization Review Department  [email or web address]  ATTENTION: Please call with any questions or concerns to [phone] and carefully listen to the prompts so that you are directed to the right person  All voicemails are confidential   Murray Macias all requests for admission clinical reviews, approved or denied determinations and any other requests to dedicated fax number below belonging to the campus where the patient is receiving treatment   List of dedicated fax numbers for the Facilities:  [address] DENIALS (Administrative/Medical Necessity) [phone]   [address] (Maternity/NICU/Pediatrics) [phone]   Juanito Seen [phone]   Janneth Forbes [phone]   Dora Dallas [phone]   [facility] [phone]   [address] [place] [address] [phone]   [facility]  [phone]   [address] [facility], [address]  [address] [address] [address] [facility] [phone]

## 2020-08-14 NOTE — QUICK NOTE
Contacted by SLIM regarding patient's history of Von Willebrand disease  Per review of primary hematologist's note patient does not respond to DDAVP  Recommendation is to receive Humate-P prior to procedure  Patient is undergoing an EGD and colonoscopy  GI is asking for recommendations  Recommendations: Humate-P 50 IU/kg prior to procedure  SLIM stated that patient has a history of allergy with Humate-P for which he received premedications and tolerated well  Recommend Benadryl 50 mg IV with hydrocortisone 100 mg IV as premeds prior to administration of Humate-P  Please contact on-call hematologist for further questions

## 2020-08-15 LAB
ENDOMYSIUM IGA SER QL: NEGATIVE
GLIADIN PEPTIDE IGA SER-ACNC: 3 UNITS (ref 0–19)
GLIADIN PEPTIDE IGG SER-ACNC: 1 UNITS (ref 0–19)
IGA SERPL-MCNC: 78 MG/DL (ref 87–352)
TTG IGA SER-ACNC: <2 U/ML (ref 0–3)
TTG IGG SER-ACNC: <2 U/ML (ref 0–5)

## 2020-08-17 NOTE — UTILIZATION REVIEW
Initial Clinical Review    OBSERVATION 8/12 @ 1910 - CHANGED TO INPATIENT ON 8/13/20 @ 1744  RE: PATIENT NEEDS > 2 MIDNIGHT STAY DUE TO ONGOING DIARRHEA WITH NEED FOR ONGOING GI WORKUP AND IVF    Admission: Date/Time/Statement:   Inpatient Admission Orders (From admission, onward)     Ordered        08/13/20 1744  Inpatient Admission  Once                   Orders Placed This Encounter   Procedures    Inpatient Admission     Standing Status:   Standing     Number of Occurrences:   1     Order Specific Question:   Admitting Physician     Answer:   An Rodriguez [[id number]]     Order Specific Question:   Level of Care     Answer:   Med Surg [16]     Order Specific Question:   Estimated length of stay     Answer:   More than 2 Midnights     Order Specific Question:   Certification     Answer:   I certify that inpatient services are medically necessary for this patient for a duration of greater than two midnights  See H&P and MD Progress Notes for additional information about the patient's course of treatment  ED Arrival Information     Expected Arrival Acuity Means of Arrival Escorted By Service Admission Type    - 8/12/2020 16:24 Urgent Walk-In Spouse Hospitalist Urgent    Arrival Complaint    DIARRHEA/DEHYDRATION        Chief Complaint   Patient presents with    Dehydration     "hx of chronic diarrhea unresolved and rectal bleeding"  pt feels fatigued and heart races when she goes up and down the stairs  reports last time she felt this way her electrolytes were off  no appetite     Assessment/Plan: This is a 78year old female from home to ED admitted to observation [address] due to diarrhea/hypokalmeia/anemia  history of Von Willebrand disease, asthma, PAF post ablation 2019, IBS, hypertension and anxiety  Presented due to  Feelings of dehydration, has been taking imodium for diarrhea for last 4 days, prior was on polystymine   Has chronic diarrhea, describes stools as brown with siddhartha red blood  Patient with 6 to 7 episodes daily, has stools with eating  On exam numerous large external hemorrhoids, guaiac positive stool  H&H 10 3/32  1   K 2 9  In the ED given IVF  1 liter bolus, total of KCL 40 meq  Plan is clears, contact precautions with stool C diff, IV hydration, replete electrolytes and consult GI    On 8/13/2020 changed to inpatient:   Patient with continued diarrhea  3 loose stools overnight  Stool enteric bacterial panel and C diff negative  To advance to gluten free diet  Continue imodium as needed  IVF continue  Admits to 3 glasses of wine daily, no signs of withdrawal and has not taken in last week  Stools occult blood negative continue iron  On 8/13/2020 per GI - Patient with diarrhea and hematochezia  History of Von Willebrand disease  Plan is regular diet  check CRP, celiac dis panel, fecal calprotectin  EGD tomorrow, CT abdomen    On 8/14/2020 patient with 10 BM since last night and hematochezia restarted  CT shows distal thickening of descending colon, possible colitis  Celiac panel pending  To start metronidazole, daily fiber, prn imodium qid    Will have EGD and colonoscopy as outpatient        ED Triage Vitals   Temperature Pulse Respirations Blood Pressure SpO2   08/12/20 1635 08/12/20 1634 08/12/20 1634 08/12/20 1634 08/12/20 1634   98 6 °F (37 °C) 94 18 152/60 100 %      Temp Source Heart Rate Source Patient Position - Orthostatic VS BP Location FiO2 (%)   08/12/20 1634 08/12/20 1634 08/12/20 1800 08/12/20 1634 --   Oral Monitor Sitting Left arm       Pain Score       08/12/20 1634       3          Wt Readings from Last 1 Encounters:   08/12/20 60 3 kg (132 lb 15 oz)     Additional Vital Signs:   08/13/20 0757   97 9 °F (36 6 °C)   76   16   130/60      100 %   None (Room air)   Lying    08/12/20 2200                     None (Room air)       08/12/20 2136   98 2 °F (36 8 °C)   81   18   129/60      100 %   None (Room air)   Lying    08/12/20 2107    74   16   145/72   91   98 %   None (Room air)   Lying    08/12/20 2030      74   16   143/68   98   100 %   None (Room air)   Sitting    08/12/20 2003      80   16   137/65      99 %   None (Room air)   Lying    08/12/20 1930      78   16   146/66   95   100 %   None (Room air)   Sitting    08/12/20 1800      74   16   130/60   87   100 %   None (Room air)   Sitting    08/12/20 1700                     None (Room air)         Pertinent Labs/Diagnostic Test Results:   8/14/2020 CT abdomen - Questional mild mural thickening of the descending colon which may reflect an infectious or inflammatory colitis  8/12 ECG - NSR with non-specific T waves         Results from last 7 days   Lab Units 08/14/20  0516 08/13/20 0621 08/12/20  1746   WBC Thousand/uL 6 34 5 27 5 77   HEMOGLOBIN g/dL 8 4* 9 3* 10 3*   HEMATOCRIT % 27 5* 30 1* 32 1*   PLATELETS Thousands/uL 282 315 358   BANDS PCT %  --   --  12*         Results from last 7 days   Lab Units 08/14/20  0516 08/13/20  0621 08/12/20  1746   SODIUM mmol/L 139 141 134*   POTASSIUM mmol/L 3 4* 3 1* 2 9*   CHLORIDE mmol/L 109* 107 97*   CO2 mmol/L 20* 22 25   ANION GAP mmol/L 10 12 12   BUN mg/dL 4* 4* 8   CREATININE mg/dL 0 74 0 76 1 12   EGFR ml/min/1 73sq m 84 81 51   CALCIUM mg/dL 7 7* 8 1* 8 9   MAGNESIUM mg/dL  --   --  2 0     Results from last 7 days   Lab Units 08/12/20  1746   AST U/L 25   ALT U/L 32   ALK PHOS U/L 125*   TOTAL PROTEIN g/dL 8 0   ALBUMIN g/dL 3 6   TOTAL BILIRUBIN mg/dL 0 36     Results from last 7 days   Lab Units 08/14/20  0516 08/13/20  0621 08/12/20  1746   GLUCOSE RANDOM mg/dL 96 79 111     Results from last 7 days   Lab Units 08/12/20  1746   TROPONIN I ng/mL <0 02         Results from last 7 days   Lab Units 08/12/20  1746   PROTIME seconds 12 6   INR  0 93   PTT seconds 26     Results from last 7 days   Lab Units 08/12/20  1746   FERRITIN ng/mL 47         Results from last 7 days   Lab Units 08/12/20  1746   LIPASE u/L 71* ED Treatment:   Medication Administration from 08/12/2020 1623 to 08/12/2020 2123    Date/Time Order Dose Route Action   08/12/2020 1750 sodium chloride 0 9 % bolus 1,000 mL 1,000 mL Intravenous New Bag   08/12/2020 2100 potassium chloride 20 mEq IVPB (premix)   Intravenous Restarted   08/12/2020 1925 potassium chloride 20 mEq IVPB (premix) 20 mEq Intravenous New Bag   08/12/2020 2058 sodium chloride 0 9 % infusion 100 mL/hr Intravenous New Bag        Past Medical History:   Diagnosis Date    Anxiety     Asthma     Atrial fibrillation ([facility] )     [facility]     Hypertension     PAF (paroxysmal atrial fibrillation) ([facility] )     Von Willebrand disease ([facility] )      Present on Admission:   Paroxysmal atrial fibrillation (HCC)   Essential hypertension   Anxiety disorder   Irritable bowel disease   Von Willebrand disease ([facility] )    Admitting Diagnosis: Diarrhea [R19 7]  Dehydration [E86 0]  Hypokalemia [E87 6]     Age/Sex: 78 y o  female     Admission Orders: 8/12/2020 1910 observation and CHANGED 8/13/2020 1744 INPATIENT   Scheduled Medications:  cholecalciferol, 1,000 Units, Oral, Daily  estradiol, 1 mg, Oral, Once per day on Mon Wed Fri  famotidine, 20 mg, Oral, Q12H  ferrous sulfate, 325 mg, Oral, Daily With Breakfast  flecainide, 75 mg, Oral, BID  FLUoxetine, 40 mg, Oral, Daily  fluticasone, 2 puff, Inhalation, Q12H IRMA  potassium chloride (K-DUR,KLOR-CON) CR tablet 40 mEq  - given on 8/12, 13 and 14  Dose: 40 mEq  Freq: Once Route: PO    Continuous IV Infusions:sodium chloride 0 9 % infusion    Rate: 100 mL/hr Dose: 100 mL/hr  Freq: Continuous Route: IV  Indications of Use: IV Hydration  Last Dose: Stopped (08/14/20 0645)  Start: 08/12/20 2045 End: 08/14/20 1669   No current facility-administered medications for this encounter       PRN Meds: not used   acetaminophen, 650 mg, Oral, Q6H PRN  albuterol, 2 puff, Inhalation, Q6H PRN  loperamide, 2 mg, Oral, 4x Daily PRN  LORazepam, 0 5 mg, Oral, Q8H PRN    Tele   SCDs  Activity as mala   Ambulate q shift   Stool studies  Diet cl liq  IP CONSULT TO GASTROENTEROLOGY      Network Utilization Review Department  [email or web address]  ATTENTION: Please call with any questions or concerns to [phone] and carefully listen to the prompts so that you are directed to the right person  All voicemails are confidential   Harlene Pair all requests for admission clinical reviews, approved or denied determinations and any other requests to dedicated fax number below belonging to the campus where the patient is receiving treatment   List of dedicated fax numbers for the Facilities:  [address] [place] [address] DENIALS (Administrative/Medical Necessity) [phone]   [address] (Maternity/NICU/Pediatrics) [phone]   Soraya Bauer [phone]     [address]  [phone]   Toy Pawan [phone]   [facility] [phone]   [address] [place] [address] [phone]   [facility]  [phone]   [address] [place] [address], [address]  [address] [facility] [address] [facility] [phone]

## 2020-08-18 ENCOUNTER — TELEPHONE (OUTPATIENT)
Dept: GASTROENTEROLOGY | Facility: AMBULARY SURGERY CENTER | Age: 70
End: 2020-08-18

## 2020-08-18 LAB
G LAMBLIA AG STL QL IA: NEGATIVE
O+P STL CONC: NORMAL

## 2020-08-18 NOTE — TELEPHONE ENCOUNTER
----- Message from Tiffanie Hussein MD sent at 8/14/2020 10:02 PM EDT -----  Please schedule patient for EGD and colonoscopy for diarrhea soon (within the next 2 weeks)  Of note patient will need Humate-P, Benadryl, and hydrocortisone prior to procedure      Thank you,Irasema

## 2020-08-20 ENCOUNTER — APPOINTMENT (OUTPATIENT)
Dept: LAB | Facility: CLINIC | Age: 70
End: 2020-08-20
Payer: COMMERCIAL

## 2020-08-20 ENCOUNTER — TRANSCRIBE ORDERS (OUTPATIENT)
Dept: LAB | Facility: CLINIC | Age: 70
End: 2020-08-20

## 2020-08-20 DIAGNOSIS — E87.6 HYPOKALEMIA: ICD-10-CM

## 2020-08-20 LAB
ANION GAP SERPL CALCULATED.3IONS-SCNC: 6 MMOL/L (ref 4–13)
BUN SERPL-MCNC: 8 MG/DL (ref 5–25)
CALCIUM SERPL-MCNC: 8.8 MG/DL (ref 8.3–10.1)
CHLORIDE SERPL-SCNC: 101 MMOL/L (ref 100–108)
CO2 SERPL-SCNC: 27 MMOL/L (ref 21–32)
CREAT SERPL-MCNC: 0.93 MG/DL (ref 0.6–1.3)
GFR SERPL CREATININE-BSD FRML MDRD: 63 ML/MIN/1.73SQ M
GLUCOSE P FAST SERPL-MCNC: 103 MG/DL (ref 65–99)
POTASSIUM SERPL-SCNC: 3.9 MMOL/L (ref 3.5–5.3)
SODIUM SERPL-SCNC: 134 MMOL/L (ref 136–145)

## 2020-08-20 PROCEDURE — 36415 COLL VENOUS BLD VENIPUNCTURE: CPT

## 2020-08-20 PROCEDURE — 80048 BASIC METABOLIC PNL TOTAL CA: CPT

## 2020-08-24 ENCOUNTER — TELEPHONE (OUTPATIENT)
Dept: HEMATOLOGY ONCOLOGY | Facility: CLINIC | Age: 70
End: 2020-08-24

## 2020-08-24 ENCOUNTER — TELEPHONE (OUTPATIENT)
Dept: GASTROENTEROLOGY | Facility: AMBULARY SURGERY CENTER | Age: 70
End: 2020-08-24

## 2020-08-24 NOTE — TELEPHONE ENCOUNTER
Spoke to cullen/RN at dr manning's office  She will look into who needs to order Humate-P, benadryl and hydrocortisone for the pt  May need to coordinate w/ infusion center   Pt is scheduled for 9/3/2020 w/ dr Darin Brown at an gi lab

## 2020-08-24 NOTE — TELEPHONE ENCOUNTER
Spoke with Dr Dalton Hanna office - Mia Liu  Colonoscopy and endoscopy scheduled 9/3/20  Patient needs infusion Benadryl, hydrocortisone and Humate- P  GI wondering how this gets ordered  How far ahead to colonoscopy/endoscopy does infusion need to take place? If they need to arrange this they will need an order      Will forward to RN with Dr Annabelle Santillan to discuss  Please call Mia Liu back at

## 2020-08-27 LAB — CALPROTECTIN STL-MCNT: 83 UG/G (ref 0–120)

## 2020-09-01 ENCOUNTER — TELEPHONE (OUTPATIENT)
Dept: GASTROENTEROLOGY | Facility: AMBULARY SURGERY CENTER | Age: 70
End: 2020-09-01

## 2020-09-01 NOTE — TELEPHONE ENCOUNTER
Moncho/RN from Dr Kate Huang office called  They will run the infusion preop for this pt scheduled on 9/10/2020 at [facility] w/ dr Eugene Minor  Need to keep this pt @ 10 O R  time w/ preop to GI at 9:15am (Moncho is aware of this) they will also contact the pt re: all infusion instructions

## 2020-09-02 NOTE — TELEPHONE ENCOUNTER
Cecil Henderson regarding this infusion  Trying to coordinate with the GI lab vs the infusion center due to timing

## 2020-09-02 NOTE — PROGRESS NOTES
Spoke with Mikie Ramos RN, in Dr Afia Haile office  Patient to be added to [facility] Infusion schedule tomorrow at 8am for Humate P prior to Colonoscopy    Unable to give in pre-op error, must be given just prior to colonoscopy scheduled at 10am   Patient to arrive for colonoscopy at [phone]

## 2020-09-03 ENCOUNTER — HOSPITAL ENCOUNTER (OUTPATIENT)
Dept: INFUSION CENTER | Facility: CLINIC | Age: 70
Discharge: HOME | End: 2020-09-03

## 2020-09-03 ENCOUNTER — HOSPITAL ENCOUNTER (OUTPATIENT)
Dept: GASTROENTEROLOGY | Facility: HOSPITAL | Age: 70
Setting detail: OUTPATIENT SURGERY
Discharge: HOME | End: 2020-09-03
Attending: INTERNAL MEDICINE

## 2020-09-03 RX ORDER — METHYLPREDNISOLONE SODIUM SUCCINATE 40 MG/ML
40 INJECTION, POWDER, LYOPHILIZED, FOR SOLUTION INTRAMUSCULAR; INTRAVENOUS ONCE
Status: DISCONTINUED | OUTPATIENT
Start: 2020-09-03 | End: 2020-09-06 | Stop reason: HOSPADM

## 2020-09-04 ENCOUNTER — TELEPHONE (OUTPATIENT)
Dept: HEMATOLOGY ONCOLOGY | Facility: CLINIC | Age: 70
End: 2020-09-04

## 2020-09-04 NOTE — TELEPHONE ENCOUNTER
Please contact patient to see when her colonoscopy is being rescheduled  If she does not know she needs to call us when she has the appt   Thanks

## 2020-11-18 ENCOUNTER — TELEPHONE (OUTPATIENT)
Dept: HEMATOLOGY ONCOLOGY | Facility: CLINIC | Age: 70
End: 2020-11-18

## 2020-11-18 ENCOUNTER — TELEPHONE (OUTPATIENT)
Dept: GASTROENTEROLOGY | Facility: AMBULARY SURGERY CENTER | Age: 70
End: 2020-11-18

## 2020-11-23 ENCOUNTER — TELEPHONE (OUTPATIENT)
Dept: HEMATOLOGY ONCOLOGY | Facility: CLINIC | Age: 70
End: 2020-11-23

## 2020-12-01 RX ORDER — METHYLPREDNISOLONE SODIUM SUCCINATE 40 MG/ML
40 INJECTION, POWDER, LYOPHILIZED, FOR SOLUTION INTRAMUSCULAR; INTRAVENOUS ONCE
Status: COMPLETED | OUTPATIENT
Start: 2020-12-03 | End: 2020-12-03

## 2020-12-02 ENCOUNTER — ANESTHESIA EVENT (OUTPATIENT)
Dept: GASTROENTEROLOGY | Facility: HOSPITAL | Age: 70
End: 2020-12-02

## 2020-12-03 ENCOUNTER — HOSPITAL ENCOUNTER (OUTPATIENT)
Dept: GASTROENTEROLOGY | Facility: HOSPITAL | Age: 70
Setting detail: OUTPATIENT SURGERY
Discharge: HOME | End: 2020-12-03
Attending: INTERNAL MEDICINE | Admitting: INTERNAL MEDICINE
Payer: COMMERCIAL

## 2020-12-03 ENCOUNTER — HOSPITAL ENCOUNTER (OUTPATIENT)
Dept: INFUSION CENTER | Facility: CLINIC | Age: 70
Discharge: HOME | End: 2020-12-03
Payer: COMMERCIAL

## 2020-12-03 ENCOUNTER — ANESTHESIA (OUTPATIENT)
Dept: GASTROENTEROLOGY | Facility: HOSPITAL | Age: 70
End: 2020-12-03

## 2020-12-03 VITALS
HEART RATE: 65 BPM | SYSTOLIC BLOOD PRESSURE: 119 MMHG | DIASTOLIC BLOOD PRESSURE: 58 MMHG | OXYGEN SATURATION: 99 % | RESPIRATION RATE: 16 BRPM | TEMPERATURE: 97.1 F

## 2020-12-03 VITALS
RESPIRATION RATE: 16 BRPM | OXYGEN SATURATION: 98 % | BODY MASS INDEX: 22.46 KG/M2 | TEMPERATURE: 98 F | HEIGHT: 65 IN | DIASTOLIC BLOOD PRESSURE: 72 MMHG | HEART RATE: 70 BPM | WEIGHT: 134.8 LBS | SYSTOLIC BLOOD PRESSURE: 138 MMHG

## 2020-12-03 VITALS — HEART RATE: 72 BPM

## 2020-12-03 DIAGNOSIS — R19.7 DIARRHEA, UNSPECIFIED TYPE: ICD-10-CM

## 2020-12-03 DIAGNOSIS — K58.0 IRRITABLE BOWEL SYNDROME WITH DIARRHEA: ICD-10-CM

## 2020-12-03 DIAGNOSIS — D64.9 ANEMIA, UNSPECIFIED TYPE: ICD-10-CM

## 2020-12-03 DIAGNOSIS — K29.00 ACUTE GASTRITIS WITHOUT HEMORRHAGE, UNSPECIFIED GASTRITIS TYPE: ICD-10-CM

## 2020-12-03 PROCEDURE — 88305 TISSUE EXAM BY PATHOLOGIST: CPT | Performed by: PATHOLOGY

## 2020-12-03 PROCEDURE — 96365 THER/PROPH/DIAG IV INF INIT: CPT

## 2020-12-03 PROCEDURE — 43239 EGD BIOPSY SINGLE/MULTIPLE: CPT | Performed by: INTERNAL MEDICINE

## 2020-12-03 PROCEDURE — 45380 COLONOSCOPY AND BIOPSY: CPT | Performed by: INTERNAL MEDICINE

## 2020-12-03 PROCEDURE — 96375 TX/PRO/DX INJ NEW DRUG ADDON: CPT

## 2020-12-03 RX ORDER — LIDOCAINE HYDROCHLORIDE 10 MG/ML
0.5 INJECTION, SOLUTION EPIDURAL; INFILTRATION; INTRACAUDAL; PERINEURAL ONCE AS NEEDED
Status: DISCONTINUED | OUTPATIENT
Start: 2020-12-03 | End: 2020-12-07 | Stop reason: HOSPADM

## 2020-12-03 RX ORDER — TOBRAMYCIN AND DEXAMETHASONE 3; 1 MG/ML; MG/ML
SUSPENSION/ DROPS OPHTHALMIC
COMMUNITY
Start: 2020-11-12 | End: 2020-12-03

## 2020-12-03 RX ORDER — LIDOCAINE HYDROCHLORIDE 20 MG/ML
INJECTION, SOLUTION EPIDURAL; INFILTRATION; INTRACAUDAL; PERINEURAL AS NEEDED
Status: DISCONTINUED | OUTPATIENT
Start: 2020-12-03 | End: 2020-12-03

## 2020-12-03 RX ORDER — CEPHALEXIN 500 MG/1
CAPSULE ORAL
COMMUNITY
Start: 2020-11-12 | End: 2020-12-03

## 2020-12-03 RX ORDER — GLYCOPYRROLATE 0.2 MG/ML
INJECTION INTRAMUSCULAR; INTRAVENOUS AS NEEDED
Status: DISCONTINUED | OUTPATIENT
Start: 2020-12-03 | End: 2020-12-03

## 2020-12-03 RX ORDER — PROPOFOL 10 MG/ML
INJECTION, EMULSION INTRAVENOUS CONTINUOUS PRN
Status: DISCONTINUED | OUTPATIENT
Start: 2020-12-03 | End: 2020-12-03

## 2020-12-03 RX ORDER — PROPOFOL 10 MG/ML
INJECTION, EMULSION INTRAVENOUS AS NEEDED
Status: DISCONTINUED | OUTPATIENT
Start: 2020-12-03 | End: 2020-12-03

## 2020-12-03 RX ORDER — FENTANYL CITRATE 50 UG/ML
INJECTION, SOLUTION INTRAMUSCULAR; INTRAVENOUS AS NEEDED
Status: DISCONTINUED | OUTPATIENT
Start: 2020-12-03 | End: 2020-12-03

## 2020-12-03 RX ORDER — PANTOPRAZOLE SODIUM 40 MG/1
40 TABLET, DELAYED RELEASE ORAL DAILY
Qty: 30 TABLET | Refills: 3 | Status: SHIPPED | OUTPATIENT
Start: 2020-12-03 | End: 2021-04-29 | Stop reason: SDUPTHER

## 2020-12-03 RX ORDER — SODIUM CHLORIDE, SODIUM LACTATE, POTASSIUM CHLORIDE, CALCIUM CHLORIDE 600; 310; 30; 20 MG/100ML; MG/100ML; MG/100ML; MG/100ML
125 INJECTION, SOLUTION INTRAVENOUS CONTINUOUS
Status: DISCONTINUED | OUTPATIENT
Start: 2020-12-03 | End: 2020-12-07 | Stop reason: HOSPADM

## 2020-12-03 RX ADMIN — PROPOFOL 30 MG: 10 INJECTION, EMULSION INTRAVENOUS at 09:46

## 2020-12-03 RX ADMIN — LIDOCAINE HYDROCHLORIDE 80 MG: 20 INJECTION, SOLUTION EPIDURAL; INFILTRATION; INTRACAUDAL at 09:42

## 2020-12-03 RX ADMIN — SODIUM CHLORIDE, SODIUM LACTATE, POTASSIUM CHLORIDE, AND CALCIUM CHLORIDE: .6; .31; .03; .02 INJECTION, SOLUTION INTRAVENOUS at 10:02

## 2020-12-03 RX ADMIN — PROPOFOL 20 MG: 10 INJECTION, EMULSION INTRAVENOUS at 09:49

## 2020-12-03 RX ADMIN — ANTIHEMOPHILIC FACTOR/VON WILLEBRAND FACTOR COMPLEX (HUMAN) 1866 UNITS: KIT at 08:33

## 2020-12-03 RX ADMIN — PROPOFOL 100 MG: 10 INJECTION, EMULSION INTRAVENOUS at 09:43

## 2020-12-03 RX ADMIN — FENTANYL CITRATE 25 MCG: 50 INJECTION INTRAMUSCULAR; INTRAVENOUS at 09:42

## 2020-12-03 RX ADMIN — SODIUM CHLORIDE, SODIUM LACTATE, POTASSIUM CHLORIDE, AND CALCIUM CHLORIDE: .6; .31; .03; .02 INJECTION, SOLUTION INTRAVENOUS at 09:34

## 2020-12-03 RX ADMIN — PROPOFOL 30 MG: 10 INJECTION, EMULSION INTRAVENOUS at 09:52

## 2020-12-03 RX ADMIN — DIPHENHYDRAMINE HYDROCHLORIDE 25 MG: 50 INJECTION, SOLUTION INTRAMUSCULAR; INTRAVENOUS at 07:51

## 2020-12-03 RX ADMIN — PROPOFOL 20 MG: 10 INJECTION, EMULSION INTRAVENOUS at 09:55

## 2020-12-03 RX ADMIN — PROPOFOL 100 MCG/KG/MIN: 10 INJECTION, EMULSION INTRAVENOUS at 09:50

## 2020-12-03 RX ADMIN — GLYCOPYRROLATE 0.1 MG: 0.2 INJECTION, SOLUTION INTRAMUSCULAR; INTRAVENOUS at 09:39

## 2020-12-03 RX ADMIN — METHYLPREDNISOLONE SODIUM SUCCINATE 40 MG: 40 INJECTION, POWDER, FOR SOLUTION INTRAMUSCULAR; INTRAVENOUS at 07:48

## 2020-12-04 ENCOUNTER — TELEPHONE (OUTPATIENT)
Dept: GASTROENTEROLOGY | Facility: AMBULARY SURGERY CENTER | Age: 70
End: 2020-12-04

## 2020-12-17 DIAGNOSIS — R19.7 DIARRHEA, UNSPECIFIED TYPE: Primary | ICD-10-CM

## 2021-01-06 ENCOUNTER — TELEPHONE (OUTPATIENT)
Dept: GASTROENTEROLOGY | Facility: CLINIC | Age: 71
End: 2021-01-06

## 2021-01-06 NOTE — TELEPHONE ENCOUNTER
Called patient made her aware arranged CT at [facility] 1/12/21 Patient aware nothing to eat 3 hours prior to test  Clear liquids only  Begin drinking Barium 9am  Drink 1/2 bottle at 3:00 and bring rest with to appointment

## 2021-01-06 NOTE — TELEPHONE ENCOUNTER
Patient contacted office  She stated she had questions in regards to procedure results  Explained all results as per Dr Aline Perez notes  Follow up appointment was arranged at patients request  Shawn Vasquez has not been helping her diarrhea and is requesting budesonide to be sent to [facility], [address]  She was reminded that she should complete labs and stool tests prior to follow up visit  She questioned why CT is needed  Please advise why CT needs to be completed so patient can be informed

## 2021-01-06 NOTE — TELEPHONE ENCOUNTER
Patient scheduled for CT Scan  Dx: diarrhea  F/U on colonic inflammation  ABD pain (acute)      Message re forwarded to [facility] to make patient aware

## 2021-01-07 ENCOUNTER — LAB (OUTPATIENT)
Dept: LAB | Facility: HOSPITAL | Age: 71
End: 2021-01-07
Attending: INTERNAL MEDICINE
Payer: COMMERCIAL

## 2021-01-07 DIAGNOSIS — R19.7 DIARRHEA, UNSPECIFIED TYPE: ICD-10-CM

## 2021-01-07 LAB
ANION GAP SERPL CALCULATED.3IONS-SCNC: 5 MMOL/L (ref 4–13)
BUN SERPL-MCNC: 18 MG/DL (ref 5–25)
CALCIUM SERPL-MCNC: 9 MG/DL (ref 8.3–10.1)
CHLORIDE SERPL-SCNC: 105 MMOL/L (ref 100–108)
CO2 SERPL-SCNC: 25 MMOL/L (ref 21–32)
CREAT SERPL-MCNC: 0.89 MG/DL (ref 0.6–1.3)
CRP SERPL QL: <3 MG/L
GFR SERPL CREATININE-BSD FRML MDRD: 67 ML/MIN/1.73SQ M
GLUCOSE SERPL-MCNC: 85 MG/DL (ref 65–140)
POTASSIUM SERPL-SCNC: 3.8 MMOL/L (ref 3.5–5.3)
SODIUM SERPL-SCNC: 135 MMOL/L (ref 136–145)

## 2021-01-07 PROCEDURE — 80048 BASIC METABOLIC PNL TOTAL CA: CPT

## 2021-01-07 PROCEDURE — 36415 COLL VENOUS BLD VENIPUNCTURE: CPT

## 2021-01-07 PROCEDURE — 86140 C-REACTIVE PROTEIN: CPT

## 2021-01-12 ENCOUNTER — HOSPITAL ENCOUNTER (OUTPATIENT)
Dept: RADIOLOGY | Facility: HOSPITAL | Age: 71
Discharge: HOME | End: 2021-01-12
Attending: INTERNAL MEDICINE
Payer: COMMERCIAL

## 2021-01-12 DIAGNOSIS — R19.7 DIARRHEA, UNSPECIFIED TYPE: ICD-10-CM

## 2021-01-12 PROCEDURE — G1004 CDSM NDSC: HCPCS

## 2021-01-12 PROCEDURE — 74177 CT ABD & PELVIS W/CONTRAST: CPT

## 2021-01-12 RX ADMIN — IOHEXOL 100 ML: 350 INJECTION, SOLUTION INTRAVENOUS at 16:18

## 2021-01-21 ENCOUNTER — TELEPHONE (OUTPATIENT)
Dept: GASTROENTEROLOGY | Facility: AMBULARY SURGERY CENTER | Age: 71
End: 2021-01-21

## 2021-01-21 NOTE — TELEPHONE ENCOUNTER
----- Message from Elen Poon MD sent at 1/20/2021  1:37 PM EST -----  Please let patient know that CT abdomen pelvis shows sigmoid colon diverticulosis without diverticulitis but otherwise colon is normal     Thank you Dr Herman Loots to room. Ready for disch.      Vandana Melton RN  10/04/20 0645

## 2021-01-21 NOTE — TELEPHONE ENCOUNTER
----- Message from Pranav Goel MD sent at 1/7/2021  1:22 PM EST -----  Please let patient know that C-reactive protein (blood test for inflammation) which is a marker inflammation has improved from 4 months ago  Kidney panel is normal     Encouraged her to keep GI follow-up appointment February      Thank you

## 2021-01-22 NOTE — TELEPHONE ENCOUNTER
Patient returned call , went over results , she verbalized understanding , she has concerns to discuss regarding the CT at her upcoming follow up , Cecelia Waterman

## 2021-02-04 ENCOUNTER — TELEMEDICINE (OUTPATIENT)
Dept: GASTROENTEROLOGY | Facility: CLINIC | Age: 71
End: 2021-02-04
Payer: COMMERCIAL

## 2021-02-04 DIAGNOSIS — K52.831 COLLAGENOUS COLITIS: ICD-10-CM

## 2021-02-04 DIAGNOSIS — K21.9 GASTROESOPHAGEAL REFLUX DISEASE, UNSPECIFIED WHETHER ESOPHAGITIS PRESENT: Primary | ICD-10-CM

## 2021-02-04 PROCEDURE — 99213 OFFICE O/P EST LOW 20 MIN: CPT | Performed by: INTERNAL MEDICINE

## 2021-02-04 NOTE — PATIENT INSTRUCTIONS
Acid reflux and heartburn  - continue pantoprazole 40 mg daily  - lifestyle changes to reduce acid reflux: cut down on caffeine (coffee, tea, soda, chocolate), peppermint, spicy/greasy foods, trigger foods (citrus, red sauces); avoid laying down for 3 hours after meals; elevate the head of your bed; avoid tight clothing around abdomen    Diarrhea/microscopic colitis  - continue loperamide/imodium 2 mg tab as needed  - continue probiotic    Follow up in 6 months with PA

## 2021-02-04 NOTE — PROGRESS NOTES
[facility] - Outpatient Follow-up Note  Telephone visit  Jaylen Mejias 78 y o  female MRN: [id number]  Encounter: [id number]          ASSESSMENT AND PLAN:      1  GERD  - continue pantoprazole 40 mg daily (started after colonoscopy that showed microscopic colitis)  - lifestyle changes to reduce acid reflux    2  Microscopic colitis  - continue loperamide/imodium 2 mg tab as needed  - continue probiotic  - discussed budesonide but will hold off for now    Follow up in 6 months with PA    ______________________________________________________________________    SUBJECTIVE:  77 yo W who has telephone visit for follow up of diarrhea, anxiety, intermittent hematochezia and colon thickening on CT  Co-morbidities:     Telephone visit  Initially seen in hospital  77 yo W with IBS, hiatal hernia, von willebrand's type 2A, parox  A  Fib with ablation  HTN, and anxiety who presented to ER in 8/2020 for diarrhea and anorexia  +intermittent hematochezia  CT abdomen pelvis showing mild thickening of the descending colon  Had outpatient EGD and colonoscopy 12/2020 for anorexia, anemia, diarrhea and intermittent hematochezia  EGD showed one salmon colored tongue, mod gastritis s/p biopsies, mild duodenal bulb erythema  Colonoscopy showed normal TI, one 2 mm polyp removed s/p clip, sigmoid colon erythema s/p bx, int/ext hemorrhoids, and random colon biopsies  Pathology: normal stomach, normal esophagus, normal terminal ileum (small bowel), colon polyp removed shows inflammation, and colon biopsies show collagenous colitis/microscopic colitis    CT A/P 1/2020 was normal       GERD  - no heartburn and acid reflux on PPI 40 qd    Microscopic colitis  - 2 BMs per day  - started on loperamide 2 mg tab and usually taking taking about 2 tabs daily   - also started probiotic which helped  - discussed budesonide but will hold off for now  - denies NSAIDs      REVIEW OF SYSTEMS IS OTHERWISE NEGATIVE  Historical Information   Past Medical History:   Diagnosis Date    Anxiety     Asthma     Atrial fibrillation (HCC)     Bowel obstruction (HCC)     Hypertension     PAF (paroxysmal atrial fibrillation) (HCC)     Von Willebrand disease ([address] )      Past Surgical History:   Procedure Laterality Date    BUNIONECTOMY      HEMORROIDECTOMY      HYSTERECTOMY      POLYPECTOMY       Social History   Social History     Substance and Sexual Activity   Alcohol Use Yes    Alcohol/week: 21 0 standard drinks    Types: 21 Glasses of wine per week     Social History     Substance and Sexual Activity   Drug Use No     Social History     Tobacco Use   Smoking Status Former Smoker    Types: Cigarettes, Pipe, Cigars    Quit date: 2011    Years since quitting: 10 1   Smokeless Tobacco Never Used   Tobacco Comment    1 pack/day for 5 years, and about 1/2 pack for 30 years     Family History   Problem Relation Age of Onset    Heart attack Father     Heart attack Sister        Meds/Allergies       Current Outpatient Medications:     albuterol (PROVENTIL HFA,VENTOLIN HFA) 90 mcg/act inhaler    beclomethasone (QVAR) 40 MCG/ACT inhaler    Cholecalciferol (VITAMIN D3) 1000 UNITS CAPS    estradiol (ESTRACE) 1 mg tablet    famotidine (PEPCID) 20 mg tablet    ferrous sulfate 325 (65 Fe) mg tablet    flecainide (TAMBOCOR) 50 mg tablet    FLUoxetine (PROzac) 40 MG capsule    loperamide (IMODIUM A-D) 2 MG tablet    LORazepam (ATIVAN) 0 5 mg tablet    pantoprazole (PROTONIX) 40 mg tablet    potassium chloride (K-DUR,KLOR-CON) 20 mEq tablet    Allergies   Allergen Reactions    Bactrim [Sulfamethoxazole-Trimethoprim]     Wellbutrin [Bupropion]            Objective     There were no vitals taken for this visit  There is no height or weight on file to calculate BMI  PHYSICAL EXAM:      General Appearance:   Alert, cooperative, no distress   HEENT:   Normocephalic, atraumatic, anicteric       Neck:  Supple, symmetrical, trachea midline   Lungs:   Clear to auscultation bilaterally; no rales, rhonchi or wheezing; respirations unlabored    Heart[de-identified]   Regular rate and rhythm; no murmur, rub, or gallop  Abdomen:   Soft, non-tender, non-distended; normal bowel sounds; no masses, no organomegaly    Rectal:   Deferred   Neuro:    Alert, oriented, no gross deficits, normal strength and tone   Extremities:  No cyanosis, clubbing or edema    Psych:  Normal mood and affect    Skin:  No jaundice, rashes, or lesions    Lymph nodes:  No palpable cervical lymphadenopathy        Lab Results:   No visits with results within 1 Day(s) from this visit  Latest known visit with results is:   Lab on 01/07/2021   Component Date Value    CRP 01/07/2021 <3 0     Sodium 01/07/2021 135*    Potassium 01/07/2021 3 8     Chloride 01/07/2021 105     CO2 01/07/2021 25     ANION GAP 01/07/2021 5     BUN 01/07/2021 18     Creatinine 01/07/2021 0 89     Glucose 01/07/2021 85     Calcium 01/07/2021 9 0     eGFR 01/07/2021 67          Radiology Results:   Ct Abdomen Pelvis W Contrast    Result Date: 1/18/2021  Narrative: CT ABDOMEN AND PELVIS WITH IV CONTRAST INDICATION:   R19 7: Diarrhea, unspecified  Abdominal pain  Colonic inflammation  COMPARISON:  August 13, 2020 TECHNIQUE:  CT examination of the abdomen and pelvis was performed  Axial, sagittal, and coronal 2D reformatted images were created from the source data and submitted for interpretation  Radiation dose length product (DLP) for this visit:  347 68 mGy-cm   This examination, like all CT scans performed in the [facility], was performed utilizing techniques to minimize radiation dose exposure, including the use of iterative  reconstruction and automated exposure control  IV Contrast:  100 mL of iohexol (OMNIPAQUE) Enteric Contrast:  Enteric contrast was administered  FINDINGS: ABDOMEN LOWER CHEST:  There is a calcified granuloma of the right middle lobe  Otherwise, the lung bases are clear  Included portion of heart is normal in size and configuration  No pericardial or pleural effusion  LIVER/BILIARY TREE:  Unremarkable  GALLBLADDER:  No calcified gallstones  No pericholecystic inflammatory change  SPLEEN:  Unremarkable  PANCREAS:  Unremarkable  ADRENAL GLANDS:  Unremarkable  KIDNEYS/URETERS:  Unremarkable  No hydronephrosis  STOMACH AND BOWEL:  The previously described mild mural thickening along the descending colon seems improved  There is some sigmoid colon diverticulosis  No convincing evidence of acute diverticulitis  The remainder of the colon is unremarkable  The stomach and small bowel loops appear within normal limits  APPENDIX:  A normal appendix was visualized  ABDOMINOPELVIC CAVITY:  No ascites or lymphadenopathy or free air  The lymphovascular structures within the sigmoid mesentery seem prominent although stable from prior  VESSELS:  Unremarkable for patient's age  PELVIS REPRODUCTIVE ORGANS:  Unremarkable for patient's age  URINARY BLADDER:  Unremarkable  ABDOMINAL WALL/INGUINAL REGIONS:  Unremarkable  OSSEOUS STRUCTURES:  No acute fracture or destructive osseous lesion  Severe chronic disc degeneration is present at L4-L5  Impression: No significant colonic wall thickening appreciated at this time  There is sigmoid colon diverticulosis without diverticulitis   Workstation performed: [id number]

## 2021-03-10 DIAGNOSIS — Z23 ENCOUNTER FOR IMMUNIZATION: ICD-10-CM

## 2021-03-21 ENCOUNTER — IMMUNIZATIONS (OUTPATIENT)
Dept: FAMILY MEDICINE CLINIC | Facility: HOSPITAL | Age: 71
End: 2021-03-21

## 2021-03-21 DIAGNOSIS — Z23 ENCOUNTER FOR IMMUNIZATION: Primary | ICD-10-CM

## 2021-03-21 PROCEDURE — 0001A SARS-COV-2 / COVID-19 MRNA VACCINE (PFIZER-BIONTECH) 30 MCG: CPT

## 2021-03-21 PROCEDURE — 91300 SARS-COV-2 / COVID-19 MRNA VACCINE (PFIZER-BIONTECH) 30 MCG: CPT

## 2021-04-11 ENCOUNTER — IMMUNIZATIONS (OUTPATIENT)
Dept: FAMILY MEDICINE CLINIC | Facility: HOSPITAL | Age: 71
End: 2021-04-11

## 2021-04-11 DIAGNOSIS — Z23 ENCOUNTER FOR IMMUNIZATION: Primary | ICD-10-CM

## 2021-04-11 PROCEDURE — 91300 SARS-COV-2 / COVID-19 MRNA VACCINE (PFIZER-BIONTECH) 30 MCG: CPT

## 2021-04-11 PROCEDURE — 0002A SARS-COV-2 / COVID-19 MRNA VACCINE (PFIZER-BIONTECH) 30 MCG: CPT

## 2021-04-29 DIAGNOSIS — K29.00 ACUTE GASTRITIS WITHOUT HEMORRHAGE, UNSPECIFIED GASTRITIS TYPE: ICD-10-CM

## 2021-04-29 RX ORDER — PANTOPRAZOLE SODIUM 40 MG/1
TABLET, DELAYED RELEASE ORAL
Qty: 90 TABLET | OUTPATIENT
Start: 2021-04-29

## 2021-04-29 RX ORDER — PANTOPRAZOLE SODIUM 40 MG/1
40 TABLET, DELAYED RELEASE ORAL DAILY
Qty: 30 TABLET | Refills: 3 | Status: SHIPPED | OUTPATIENT
Start: 2021-04-29 | End: 2021-10-14

## 2021-04-29 NOTE — TELEPHONE ENCOUNTER
GI Physician: Dr Ottoniel Berry     Refill Request for Medication: pantoprazole     Dose: 40 mg     Quantity: 30 tablet     Pharmacy and Location: [facility]

## 2021-08-04 DIAGNOSIS — E87.6 HYPOKALEMIA: ICD-10-CM

## 2021-08-04 DIAGNOSIS — I48.0 PAROXYSMAL ATRIAL FIBRILLATION (HCC): ICD-10-CM

## 2021-08-04 RX ORDER — FLECAINIDE ACETATE 50 MG/1
TABLET ORAL
Qty: 270 TABLET | Refills: 3 | Status: SHIPPED | OUTPATIENT
Start: 2021-08-04 | End: 2022-01-17 | Stop reason: SDUPTHER

## 2021-08-05 RX ORDER — POTASSIUM CHLORIDE 20 MEQ/1
40 TABLET, EXTENDED RELEASE ORAL DAILY
Qty: 60 TABLET | Refills: 8 | Status: SHIPPED | OUTPATIENT
Start: 2021-08-05 | End: 2021-08-09 | Stop reason: SDUPTHER

## 2021-08-09 DIAGNOSIS — E87.6 HYPOKALEMIA: ICD-10-CM

## 2021-08-09 RX ORDER — POTASSIUM CHLORIDE 20 MEQ/1
40 TABLET, EXTENDED RELEASE ORAL DAILY
Qty: 180 TABLET | Refills: 3 | Status: SHIPPED | OUTPATIENT
Start: 2021-08-09

## 2021-08-10 ENCOUNTER — OFFICE VISIT (OUTPATIENT)
Dept: CARDIOLOGY CLINIC | Facility: CLINIC | Age: 71
End: 2021-08-10
Payer: COMMERCIAL

## 2021-08-10 VITALS
WEIGHT: 143.4 LBS | BODY MASS INDEX: 23.89 KG/M2 | DIASTOLIC BLOOD PRESSURE: 70 MMHG | HEIGHT: 65 IN | HEART RATE: 67 BPM | OXYGEN SATURATION: 98 % | SYSTOLIC BLOOD PRESSURE: 124 MMHG

## 2021-08-10 DIAGNOSIS — I10 ESSENTIAL HYPERTENSION: ICD-10-CM

## 2021-08-10 DIAGNOSIS — I48.0 PAROXYSMAL ATRIAL FIBRILLATION (HCC): Primary | ICD-10-CM

## 2021-08-10 DIAGNOSIS — F41.9 ANXIETY DISORDER, UNSPECIFIED TYPE: ICD-10-CM

## 2021-08-10 PROCEDURE — 3008F BODY MASS INDEX DOCD: CPT | Performed by: INTERNAL MEDICINE

## 2021-08-10 PROCEDURE — 1160F RVW MEDS BY RX/DR IN RCRD: CPT | Performed by: INTERNAL MEDICINE

## 2021-08-10 PROCEDURE — 1036F TOBACCO NON-USER: CPT | Performed by: INTERNAL MEDICINE

## 2021-08-10 PROCEDURE — 99214 OFFICE O/P EST MOD 30 MIN: CPT | Performed by: INTERNAL MEDICINE

## 2021-08-10 RX ORDER — OXYMETAZOLINE HYDROCHLORIDE 1 G/100G
CREAM TOPICAL
COMMUNITY

## 2021-08-10 NOTE — PROGRESS NOTES
Assessment/Plan:    Anxiety disorder    History of anxiety disorder, stable  Essential hypertension    Hypertension, stable and adequately controlled  Paroxysmal atrial fibrillation (HCC)    Paroxysmal atrial fibrillation status post ablation therapy  Very infrequent instances of atrial fibrillation may be present with minimal symptoms of palpitation  Patient is not on anticoagulation because of history of von Willebrand's disease  Diagnoses and all orders for this visit:    Paroxysmal atrial fibrillation (HCC)    Essential hypertension    Anxiety disorder, unspecified type    Other orders  -     Oxymetazoline HCl (Rhofade) 1 % CREA; Apply topically          Subjective:     Feels well  Patient ID: Camille Bob is a 73 y o  female  Patient presented to this office for the purpose of cardiac follow-up  She is known to have history of paroxysmal atrial fibrillation as well as hypertension  The patient also has a history of anxiety disorder  The patient has been feeling rather well with only 1 remote episode of palpitation raising the possibility of brief episode of atrial fibrillation  Otherwise the patient has been asymptomatic  She denies any symptom chest pain or shortness of breath  She has no symptoms of dizziness or syncope  She has no leg edema  The following portions of the patient's history were reviewed and updated as appropriate: allergies, current medications, past family history, past medical history, past social history, past surgical history and problem list     Review of Systems   Respiratory: Negative for apnea, cough, chest tightness, shortness of breath and wheezing  Cardiovascular: Negative for chest pain, palpitations and leg swelling  Gastrointestinal: Negative for abdominal pain  Neurological: Negative for dizziness and light-headedness  Psychiatric/Behavioral: Negative  Objective:  Stable cardiac-wise        /70 (BP Location: Left arm, Patient Position: Sitting, Cuff Size: Adult)   Pulse 67   Ht 5' 5" (1 651 m)   Wt 65 kg (143 lb 6 4 oz)   SpO2 98%   BMI 23 86 kg/m²          Physical Exam  Vitals reviewed  Constitutional:       General: She is not in acute distress  Appearance: She is well-developed  She is not diaphoretic  HENT:      Head: Normocephalic and atraumatic  Neck:      Thyroid: No thyromegaly  Vascular: No JVD  Cardiovascular:      Rate and Rhythm: Normal rate and regular rhythm  Heart sounds: S1 normal and S2 normal  No murmur heard  No friction rub  No gallop  Pulmonary:      Effort: Pulmonary effort is normal  No respiratory distress  Breath sounds: No wheezing or rales  Chest:      Chest wall: No tenderness  Abdominal:      Palpations: Abdomen is soft  Musculoskeletal:      Cervical back: Normal range of motion and neck supple  Right lower leg: No edema  Left lower leg: No edema  Skin:     General: Skin is warm and dry  Neurological:      Mental Status: She is oriented to person, place, and time     Psychiatric:         Mood and Affect: Mood normal          Behavior: Behavior normal

## 2021-08-10 NOTE — ASSESSMENT & PLAN NOTE
Paroxysmal atrial fibrillation status post ablation therapy  Very infrequent instances of atrial fibrillation may be present with minimal symptoms of palpitation  Patient is not on anticoagulation because of history of von Willebrand's disease

## 2021-08-10 NOTE — LETTER
August 10, 2021     [place] [id number]    Patient: Camille Bob   YOB: 1950   Date of Visit: 8/10/2021       Dear Dr Shireen Tao: Thank you for referring Marcos Hernandez to me for evaluation  Below are my notes for this consultation  If you have questions, please do not hesitate to call me  I look forward to following your patient along with you  Sincerely,        Chanda Ramirez MD        CC: No Recipients  Chanda Ramirez MD  8/10/2021 11:52 AM  Sign when Signing Visit  Assessment/Plan:    Anxiety disorder    History of anxiety disorder, stable  Essential hypertension    Hypertension, stable and adequately controlled  Paroxysmal atrial fibrillation (HCC)    Paroxysmal atrial fibrillation status post ablation therapy  Very infrequent instances of atrial fibrillation may be present with minimal symptoms of palpitation  Patient is not on anticoagulation because of history of von Willebrand's disease  Diagnoses and all orders for this visit:    Paroxysmal atrial fibrillation (HCC)    Essential hypertension    Anxiety disorder, unspecified type    Other orders  -     Oxymetazoline HCl (Rhofade) 1 % CREA; Apply topically          Subjective:     Feels well  Patient ID: Camille Bob is a 73 y o  female  Patient presented to this office for the purpose of cardiac follow-up  She is known to have history of paroxysmal atrial fibrillation as well as hypertension  The patient also has a history of anxiety disorder  The patient has been feeling rather well with only 1 remote episode of palpitation raising the possibility of brief episode of atrial fibrillation  Otherwise the patient has been asymptomatic  She denies any symptom chest pain or shortness of breath  She has no symptoms of dizziness or syncope  She has no leg edema        The following portions of the patient's history were reviewed and updated as appropriate: allergies, current medications, past family history, past medical history, past social history, past surgical history and problem list     Review of Systems   Respiratory: Negative for apnea, cough, chest tightness, shortness of breath and wheezing  Cardiovascular: Negative for chest pain, palpitations and leg swelling  Gastrointestinal: Negative for abdominal pain  Neurological: Negative for dizziness and light-headedness  Psychiatric/Behavioral: Negative  Objective:  Stable cardiac-wise  /70 (BP Location: Left arm, Patient Position: Sitting, Cuff Size: Adult)   Pulse 67   Ht 5' 5" (1 651 m)   Wt 65 kg (143 lb 6 4 oz)   SpO2 98%   BMI 23 86 kg/m²          Physical Exam  Vitals reviewed  Constitutional:       General: She is not in acute distress  Appearance: She is well-developed  She is not diaphoretic  HENT:      Head: Normocephalic and atraumatic  Neck:      Thyroid: No thyromegaly  Vascular: No JVD  Cardiovascular:      Rate and Rhythm: Normal rate and regular rhythm  Heart sounds: S1 normal and S2 normal  No murmur heard  No friction rub  No gallop  Pulmonary:      Effort: Pulmonary effort is normal  No respiratory distress  Breath sounds: No wheezing or rales  Chest:      Chest wall: No tenderness  Abdominal:      Palpations: Abdomen is soft  Musculoskeletal:      Cervical back: Normal range of motion and neck supple  Right lower leg: No edema  Left lower leg: No edema  Skin:     General: Skin is warm and dry  Neurological:      Mental Status: She is oriented to person, place, and time     Psychiatric:         Mood and Affect: Mood normal          Behavior: Behavior normal

## 2021-10-11 ENCOUNTER — TELEPHONE (OUTPATIENT)
Dept: GASTROENTEROLOGY | Facility: AMBULARY SURGERY CENTER | Age: 71
End: 2021-10-11

## 2021-10-14 DIAGNOSIS — K29.00 ACUTE GASTRITIS WITHOUT HEMORRHAGE, UNSPECIFIED GASTRITIS TYPE: ICD-10-CM

## 2021-10-14 RX ORDER — PANTOPRAZOLE SODIUM 40 MG/1
TABLET, DELAYED RELEASE ORAL
Qty: 30 TABLET | Refills: 3 | Status: SHIPPED | OUTPATIENT
Start: 2021-10-14

## 2021-12-08 ENCOUNTER — LAB REQUISITION (OUTPATIENT)
Dept: LAB | Facility: HOSPITAL | Age: 71
End: 2021-12-08
Payer: COMMERCIAL

## 2021-12-08 DIAGNOSIS — E87.6 HYPOKALEMIA: ICD-10-CM

## 2021-12-08 DIAGNOSIS — I10 ESSENTIAL (PRIMARY) HYPERTENSION: ICD-10-CM

## 2021-12-08 DIAGNOSIS — I48.20 CHRONIC ATRIAL FIBRILLATION, UNSPECIFIED (HCC): ICD-10-CM

## 2021-12-08 LAB
ALBUMIN SERPL BCP-MCNC: 3.6 G/DL (ref 3.5–5)
ALP SERPL-CCNC: 97 U/L (ref 46–116)
ALT SERPL W P-5'-P-CCNC: 47 U/L (ref 12–78)
ANION GAP SERPL CALCULATED.3IONS-SCNC: 8 MMOL/L (ref 4–13)
AST SERPL W P-5'-P-CCNC: 34 U/L (ref 5–45)
BILIRUB SERPL-MCNC: 0.48 MG/DL (ref 0.2–1)
BUN SERPL-MCNC: 15 MG/DL (ref 5–25)
CALCIUM SERPL-MCNC: 8.9 MG/DL (ref 8.3–10.1)
CHLORIDE SERPL-SCNC: 104 MMOL/L (ref 100–108)
CO2 SERPL-SCNC: 25 MMOL/L (ref 21–32)
CREAT SERPL-MCNC: 0.93 MG/DL (ref 0.6–1.3)
GFR SERPL CREATININE-BSD FRML MDRD: 63 ML/MIN/1.73SQ M
GLUCOSE SERPL-MCNC: 80 MG/DL (ref 65–140)
POTASSIUM SERPL-SCNC: 3.9 MMOL/L (ref 3.5–5.3)
PROT SERPL-MCNC: 7.3 G/DL (ref 6.4–8.2)
SODIUM SERPL-SCNC: 137 MMOL/L (ref 136–145)
TSH SERPL DL<=0.05 MIU/L-ACNC: 2.58 UIU/ML (ref 0.36–3.74)

## 2021-12-08 PROCEDURE — 80053 COMPREHEN METABOLIC PANEL: CPT | Performed by: INTERNAL MEDICINE

## 2021-12-08 PROCEDURE — 84443 ASSAY THYROID STIM HORMONE: CPT | Performed by: INTERNAL MEDICINE

## 2021-12-20 ENCOUNTER — IMMUNIZATIONS (OUTPATIENT)
Dept: FAMILY MEDICINE CLINIC | Facility: HOSPITAL | Age: 71
End: 2021-12-20

## 2021-12-20 DIAGNOSIS — Z23 ENCOUNTER FOR IMMUNIZATION: Primary | ICD-10-CM

## 2021-12-20 PROCEDURE — 91300 COVID-19 PFIZER VACC 0.3 ML: CPT

## 2021-12-20 PROCEDURE — 0001A COVID-19 PFIZER VACC 0.3 ML: CPT

## 2022-01-17 ENCOUNTER — APPOINTMENT (OUTPATIENT)
Dept: LAB | Facility: HOSPITAL | Age: 72
End: 2022-01-17
Attending: INTERNAL MEDICINE
Payer: COMMERCIAL

## 2022-01-17 ENCOUNTER — OFFICE VISIT (OUTPATIENT)
Dept: CARDIOLOGY CLINIC | Facility: CLINIC | Age: 72
End: 2022-01-17
Payer: COMMERCIAL

## 2022-01-17 VITALS
OXYGEN SATURATION: 99 % | DIASTOLIC BLOOD PRESSURE: 72 MMHG | HEART RATE: 66 BPM | WEIGHT: 135 LBS | BODY MASS INDEX: 22.49 KG/M2 | SYSTOLIC BLOOD PRESSURE: 118 MMHG | HEIGHT: 65 IN

## 2022-01-17 DIAGNOSIS — I48.0 PAROXYSMAL ATRIAL FIBRILLATION (HCC): Primary | ICD-10-CM

## 2022-01-17 DIAGNOSIS — I48.3 TYPICAL ATRIAL FLUTTER (HCC): ICD-10-CM

## 2022-01-17 DIAGNOSIS — I10 ESSENTIAL HYPERTENSION: ICD-10-CM

## 2022-01-17 DIAGNOSIS — I48.0 PAROXYSMAL ATRIAL FIBRILLATION (HCC): ICD-10-CM

## 2022-01-17 DIAGNOSIS — F41.9 ANXIETY DISORDER, UNSPECIFIED TYPE: ICD-10-CM

## 2022-01-17 LAB
CHOLEST SERPL-MCNC: 206 MG/DL
HDLC SERPL-MCNC: 96 MG/DL
LDLC SERPL CALC-MCNC: 86 MG/DL (ref 0–100)
TRIGL SERPL-MCNC: 120 MG/DL

## 2022-01-17 PROCEDURE — 80061 LIPID PANEL: CPT

## 2022-01-17 PROCEDURE — 99214 OFFICE O/P EST MOD 30 MIN: CPT | Performed by: INTERNAL MEDICINE

## 2022-01-17 PROCEDURE — 36415 COLL VENOUS BLD VENIPUNCTURE: CPT

## 2022-01-17 RX ORDER — METOPROLOL SUCCINATE 50 MG/1
50 TABLET, EXTENDED RELEASE ORAL DAILY
Qty: 90 TABLET | Refills: 3 | Status: SHIPPED | OUTPATIENT
Start: 2022-01-17

## 2022-01-17 RX ORDER — FLECAINIDE ACETATE 50 MG/1
75 TABLET ORAL 2 TIMES DAILY
Qty: 270 TABLET | Refills: 3 | Status: SHIPPED | OUTPATIENT
Start: 2022-01-17

## 2022-01-17 RX ORDER — BECLOMETHASONE DIPROPIONATE HFA 80 UG/1
2 AEROSOL, METERED RESPIRATORY (INHALATION) 2 TIMES DAILY
COMMUNITY
Start: 2021-10-12

## 2022-01-17 RX ORDER — LISINOPRIL 10 MG/1
10 TABLET ORAL DAILY
COMMUNITY
End: 2022-03-15

## 2022-01-17 NOTE — PATIENT INSTRUCTIONS
Patient will be started on metoprolol succinate ER 50 mg daily  Other medications remain the same  I am scheduling the patient for follow-up lipid profile

## 2022-01-17 NOTE — ASSESSMENT & PLAN NOTE
Hypertension, generally stable however the patient describes  When the blood pressure is as high as 150/80  As mentioned above I will be starting the patient on metoprolol succinate ER increased heart rate  that may help with her blood pressure situation

## 2022-01-17 NOTE — PROGRESS NOTES
Assessment/Plan:    Paroxysmal atrial fibrillation (HCC)    Paroxysmal atrial fibrillation and flutter  This has been stable  Patient however exhibits symptoms of palpitation and racing sensation at night with heart rate in the vicinity of  beats per minute  It is not clear if this is associated with increased level of anxiety  I will however start the patient on a small dose of metoprolol succinate ER at 50  Mg daily  Essential hypertension    Hypertension, generally stable however the patient describes  When the blood pressure is as high as 150/80  As mentioned above I will be starting the patient on metoprolol succinate ER increased heart rate  that may help with her blood pressure situation  Anxiety disorder    Anxiety disorder, reasonably stable with some breakthrough  This could be contributing to the patient's sensation of rapid heartbeats  Diagnoses and all orders for this visit:    Paroxysmal atrial fibrillation (HCC)  -     flecainide (TAMBOCOR) 50 mg tablet; Take 1 5 tablets (75 mg total) by mouth 2 (two) times a day  -     metoprolol succinate (TOPROL-XL) 50 mg 24 hr tablet; Take 1 tablet (50 mg total) by mouth daily  -     Lipid Panel with Direct LDL reflex; Future    Typical atrial flutter (HCC)    Essential hypertension    Anxiety disorder, unspecified type    Other orders  -     Qvar RediHaler 80 MCG/ACT inhaler; 2 puffs 2 (two) times a day  -     lisinopril (ZESTRIL) 10 mg tablet; Take 10 mg by mouth daily Only takes when her bp is up          Subjective:  Racing sensation and elevated blood pressure  Patient ID: James Zheng is a 73 y o  female  The patient presented to this office for the purpose of cardiac follow-up  She is known to have a history of paroxysmal atrial fibrillation and atrial flutter  She also has a history of hypertension and anxiety disorder    The patient has described the symptoms palpitation racing sensation in the chest   This can occur at night   She was able to count her pulse to be in the vicinity 90 to beats per minute  She has not noticed any excessive heart rate increased and she does not think that she has been in atrial fibrillation she denies any symptoms chest pain or shortness of breath  She has no leg edema  She continues to exhibit some breakthrough anxiety as well  The following portions of the patient's history were reviewed and updated as appropriate: allergies, current medications, past family history, past medical history, past social history, past surgical history and problem list     Review of Systems   Respiratory: Negative for apnea, cough, chest tightness, shortness of breath and wheezing  Cardiovascular: Positive for palpitations  Negative for chest pain and leg swelling  Gastrointestinal: Negative for abdominal pain  Neurological: Negative for dizziness and light-headedness  Psychiatric/Behavioral: The patient is nervous/anxious  Objective:  Stable cardiac-wise      /72 (BP Location: Left arm, Patient Position: Sitting, Cuff Size: Adult)   Pulse 66   Ht 5' 5" (1 651 m)   Wt 61 2 kg (135 lb)   SpO2 99%   BMI 22 47 kg/m²          Physical Exam  Vitals reviewed  Constitutional:       General: She is not in acute distress  Appearance: She is well-developed  She is not diaphoretic  HENT:      Head: Normocephalic and atraumatic  Neck:      Thyroid: No thyromegaly  Vascular: No JVD  Cardiovascular:      Rate and Rhythm: Normal rate and regular rhythm  Heart sounds: S1 normal and S2 normal  No murmur heard  No friction rub  No gallop  Pulmonary:      Effort: Pulmonary effort is normal  No respiratory distress  Breath sounds: No wheezing or rales  Chest:      Chest wall: No tenderness  Abdominal:      Palpations: Abdomen is soft  Musculoskeletal:      Cervical back: Normal range of motion and neck supple  Right lower leg: No edema        Left lower leg: No edema    Skin:     General: Skin is warm and dry  Neurological:      Mental Status: She is oriented to person, place, and time     Psychiatric:         Mood and Affect: Mood normal          Behavior: Behavior normal

## 2022-01-17 NOTE — ASSESSMENT & PLAN NOTE
Anxiety disorder, reasonably stable with some breakthrough  This could be contributing to the patient's sensation of rapid heartbeats

## 2022-01-17 NOTE — LETTER
January 17, 2022     [place] [id number]    Patient: Rufina Gutierrez   YOB: 1950   Date of Visit: 1/17/2022       Dear Dr Vashti Britt: Thank you for referring Alysha Baker to me for evaluation  Below are my notes for this consultation  If you have questions, please do not hesitate to call me  I look forward to following your patient along with you  Sincerely,        Ciro Lockhart MD        CC: No Recipients  Ciro Lockhrat MD  1/17/2022  1:37 PM  Sign when Signing Visit  Assessment/Plan:    Paroxysmal atrial fibrillation ([address] )    Paroxysmal atrial fibrillation and flutter  This has been stable  Patient however exhibits symptoms of palpitation and racing sensation at night with heart rate in the vicinity of  beats per minute  It is not clear if this is associated with increased level of anxiety  I will however start the patient on a small dose of metoprolol succinate ER at 50  Mg daily  Essential hypertension    Hypertension, generally stable however the patient describes  When the blood pressure is as high as 150/80  As mentioned above I will be starting the patient on metoprolol succinate ER increased heart rate  that may help with her blood pressure situation  Anxiety disorder    Anxiety disorder, reasonably stable with some breakthrough  This could be contributing to the patient's sensation of rapid heartbeats  Diagnoses and all orders for this visit:    Paroxysmal atrial fibrillation (HCC)  -     flecainide (TAMBOCOR) 50 mg tablet; Take 1 5 tablets (75 mg total) by mouth 2 (two) times a day  -     metoprolol succinate (TOPROL-XL) 50 mg 24 hr tablet; Take 1 tablet (50 mg total) by mouth daily  -     Lipid Panel with Direct LDL reflex;  Future    Typical atrial flutter (HCC)    Essential hypertension    Anxiety disorder, unspecified type    Other orders  -     Qvar RediHaler 80 MCG/ACT inhaler; 2 puffs 2 (two) times a day  -     lisinopril (ZESTRIL) 10 mg tablet; Take 10 mg by mouth daily Only takes when her bp is up          Subjective:  Racing sensation and elevated blood pressure  Patient ID: Martha Spencer is a 73 y o  female  The patient presented to this office for the purpose of cardiac follow-up  She is known to have a history of paroxysmal atrial fibrillation and atrial flutter  She also has a history of hypertension and anxiety disorder  The patient has described the symptoms palpitation racing sensation in the chest   This can occur at night  She was able to count her pulse to be in the vicinity 90 to beats per minute  She has not noticed any excessive heart rate increased and she does not think that she has been in atrial fibrillation she denies any symptoms chest pain or shortness of breath  She has no leg edema  She continues to exhibit some breakthrough anxiety as well  The following portions of the patient's history were reviewed and updated as appropriate: allergies, current medications, past family history, past medical history, past social history, past surgical history and problem list     Review of Systems   Respiratory: Negative for apnea, cough, chest tightness, shortness of breath and wheezing  Cardiovascular: Positive for palpitations  Negative for chest pain and leg swelling  Gastrointestinal: Negative for abdominal pain  Neurological: Negative for dizziness and light-headedness  Psychiatric/Behavioral: The patient is nervous/anxious  Objective:  Stable cardiac-wise      /72 (BP Location: Left arm, Patient Position: Sitting, Cuff Size: Adult)   Pulse 66   Ht 5' 5" (1 651 m)   Wt 61 2 kg (135 lb)   SpO2 99%   BMI 22 47 kg/m²          Physical Exam  Vitals reviewed  Constitutional:       General: She is not in acute distress  Appearance: She is well-developed  She is not diaphoretic  HENT:      Head: Normocephalic and atraumatic     Neck: Thyroid: No thyromegaly  Vascular: No JVD  Cardiovascular:      Rate and Rhythm: Normal rate and regular rhythm  Heart sounds: S1 normal and S2 normal  No murmur heard  No friction rub  No gallop  Pulmonary:      Effort: Pulmonary effort is normal  No respiratory distress  Breath sounds: No wheezing or rales  Chest:      Chest wall: No tenderness  Abdominal:      Palpations: Abdomen is soft  Musculoskeletal:      Cervical back: Normal range of motion and neck supple  Right lower leg: No edema  Left lower leg: No edema  Skin:     General: Skin is warm and dry  Neurological:      Mental Status: She is oriented to person, place, and time     Psychiatric:         Mood and Affect: Mood normal          Behavior: Behavior normal

## 2022-01-17 NOTE — ASSESSMENT & PLAN NOTE
Paroxysmal atrial fibrillation and flutter  This has been stable  Patient however exhibits symptoms of palpitation and racing sensation at night with heart rate in the vicinity of  beats per minute  It is not clear if this is associated with increased level of anxiety  I will however start the patient on a small dose of metoprolol succinate ER at 50  Mg daily

## 2022-03-15 ENCOUNTER — TELEPHONE (OUTPATIENT)
Dept: CARDIOLOGY CLINIC | Facility: CLINIC | Age: 72
End: 2022-03-15

## 2022-03-15 DIAGNOSIS — I10 ESSENTIAL HYPERTENSION: Primary | ICD-10-CM

## 2022-03-15 RX ORDER — LOSARTAN POTASSIUM 50 MG/1
50 TABLET ORAL DAILY
Qty: 30 TABLET | Refills: 6 | Status: SHIPPED | OUTPATIENT
Start: 2022-03-15 | End: 2022-03-16 | Stop reason: SDUPTHER

## 2022-03-15 NOTE — TELEPHONE ENCOUNTER
Patient called stating she is concerned of SBP averaging 140-160 and asking for another medication?      She is currently taking:   Flecainide 75 mg  Toprol 50 mg    C/b# [phone]

## 2022-03-15 NOTE — TELEPHONE ENCOUNTER
Spoke with the patient  Losartan 50 mg daily ordered along with BMP in 2 weeks  The patient is not on lisinopril

## 2022-03-16 DIAGNOSIS — I10 ESSENTIAL HYPERTENSION: ICD-10-CM

## 2022-03-16 RX ORDER — LOSARTAN POTASSIUM 50 MG/1
50 TABLET ORAL DAILY
Qty: 90 TABLET | Refills: 1 | Status: SHIPPED | OUTPATIENT
Start: 2022-03-16

## 2022-07-18 ENCOUNTER — OFFICE VISIT (OUTPATIENT)
Dept: CARDIOLOGY CLINIC | Facility: CLINIC | Age: 72
End: 2022-07-18
Payer: COMMERCIAL

## 2022-07-18 VITALS
HEART RATE: 67 BPM | OXYGEN SATURATION: 100 % | SYSTOLIC BLOOD PRESSURE: 128 MMHG | HEIGHT: 65 IN | BODY MASS INDEX: 23.32 KG/M2 | WEIGHT: 140 LBS | DIASTOLIC BLOOD PRESSURE: 78 MMHG

## 2022-07-18 DIAGNOSIS — F41.9 ANXIETY DISORDER, UNSPECIFIED TYPE: ICD-10-CM

## 2022-07-18 DIAGNOSIS — I48.0 PAROXYSMAL ATRIAL FIBRILLATION (HCC): Primary | ICD-10-CM

## 2022-07-18 DIAGNOSIS — I10 ESSENTIAL HYPERTENSION: ICD-10-CM

## 2022-07-18 PROCEDURE — 99214 OFFICE O/P EST MOD 30 MIN: CPT | Performed by: INTERNAL MEDICINE

## 2022-07-18 NOTE — ASSESSMENT & PLAN NOTE
Paroxysmal atrial fibrillation flutter status post ablation therapy  The patient is doing well been in regular rhythm  No significant symptoms of palpitation  We will continue present regimen  Patient is not on anticoagulation because history Von Willebrand's disease

## 2022-07-18 NOTE — PROGRESS NOTES
Assessment/Plan:    Paroxysmal atrial fibrillation (HCC)    Paroxysmal atrial fibrillation flutter status post ablation therapy  The patient is doing well been in regular rhythm  No significant symptoms of palpitation  We will continue present regimen  Patient is not on anticoagulation because history Von Willebrand's disease  Essential hypertension    Hypertension, stable and adequately controlled  Anxiety disorder    Anxiety disorder, stable  Diagnoses and all orders for this visit:    Paroxysmal atrial fibrillation ([address] )    Essential hypertension    Anxiety disorder, unspecified type          Subjective:   Feels generally well  Patient ID: Martha Spencer is a 81 y o  female  The patient presented to this office for the purpose of cardiac follow-up  She is known to have history of paroxysmal atrial fibrillation and flutter as well as a history of hypertension  She has a history of anxiety disorder as well  Patient has been feeling rather well  Her symptoms of palpitation have greatly diminished  She has symptoms of dizziness or lightheadedness  She denies any symptom of chest pain or shortness of breath  She has no leg edema  The following portions of the patient's history were reviewed and updated as appropriate: allergies, current medications, past family history, past medical history, past social history, past surgical history and problem list     Review of Systems   Respiratory: Negative for apnea, cough, chest tightness, shortness of breath and wheezing  Cardiovascular: Negative for chest pain, palpitations and leg swelling  Gastrointestinal: Negative for abdominal pain  Neurological: Negative for dizziness and light-headedness  Psychiatric/Behavioral: Negative  Objective:  Stable cardiac-wise        /78 (BP Location: Left arm, Patient Position: Sitting, Cuff Size: Standard)   Pulse 67   Ht 5' 5" (1 651 m)   Wt 63 5 kg (140 lb)   SpO2 100%   BMI 23 30 kg/m²          Physical Exam  Vitals reviewed  Constitutional:       General: She is not in acute distress  Appearance: She is well-developed  She is not diaphoretic  HENT:      Head: Normocephalic and atraumatic  Neck:      Thyroid: No thyromegaly  Vascular: No JVD  Cardiovascular:      Rate and Rhythm: Normal rate and regular rhythm  Heart sounds: S1 normal and S2 normal  No murmur heard  No friction rub  No gallop  Pulmonary:      Effort: Pulmonary effort is normal  No respiratory distress  Breath sounds: No wheezing or rales  Chest:      Chest wall: No tenderness  Abdominal:      Palpations: Abdomen is soft  Musculoskeletal:      Cervical back: Normal range of motion and neck supple  Right lower leg: No edema  Left lower leg: No edema  Skin:     General: Skin is warm and dry  Neurological:      Mental Status: She is oriented to person, place, and time     Psychiatric:         Mood and Affect: Mood normal          Behavior: Behavior normal

## 2022-07-18 NOTE — LETTER
July 18, 2022     Sakina Cadena MD  [address] [address]    Patient: Dixie Horn   YOB: 1950   Date of Visit: 7/18/2022       Dear Dr Alvarado Hobson: Thank you for referring Lorraine Ng to me for evaluation  Below are my notes for this consultation  If you have questions, please do not hesitate to call me  I look forward to following your patient along with you  Sincerely,        Katey Grossman MD        CC: No Recipients  Katey Grossman MD  7/18/2022 12:06 PM  Sign when Signing Visit  Assessment/Plan:    Paroxysmal atrial fibrillation ([address] )    Paroxysmal atrial fibrillation flutter status post ablation therapy  The patient is doing well been in regular rhythm  No significant symptoms of palpitation  We will continue present regimen  Patient is not on anticoagulation because history Von Willebrand's disease  Essential hypertension    Hypertension, stable and adequately controlled  Anxiety disorder    Anxiety disorder, stable  Diagnoses and all orders for this visit:    Paroxysmal atrial fibrillation ([address] )    Essential hypertension    Anxiety disorder, unspecified type          Subjective:   Feels generally well  Patient ID: Dixie Horn is a 81 y o  female  The patient presented to this office for the purpose of cardiac follow-up  She is known to have history of paroxysmal atrial fibrillation and flutter as well as a history of hypertension  She has a history of anxiety disorder as well  Patient has been feeling rather well  Her symptoms of palpitation have greatly diminished  She has symptoms of dizziness or lightheadedness  She denies any symptom of chest pain or shortness of breath  She has no leg edema        The following portions of the patient's history were reviewed and updated as appropriate: allergies, current medications, past family history, past medical history, past social history, past surgical history and problem list     Review of Systems   Respiratory: Negative for apnea, cough, chest tightness, shortness of breath and wheezing  Cardiovascular: Negative for chest pain, palpitations and leg swelling  Gastrointestinal: Negative for abdominal pain  Neurological: Negative for dizziness and light-headedness  Psychiatric/Behavioral: Negative  Objective:  Stable cardiac-wise  /78 (BP Location: Left arm, Patient Position: Sitting, Cuff Size: Standard)   Pulse 67   Ht 5' 5" (1 651 m)   Wt 63 5 kg (140 lb)   SpO2 100%   BMI 23 30 kg/m²          Physical Exam  Vitals reviewed  Constitutional:       General: She is not in acute distress  Appearance: She is well-developed  She is not diaphoretic  HENT:      Head: Normocephalic and atraumatic  Neck:      Thyroid: No thyromegaly  Vascular: No JVD  Cardiovascular:      Rate and Rhythm: Normal rate and regular rhythm  Heart sounds: S1 normal and S2 normal  No murmur heard  No friction rub  No gallop  Pulmonary:      Effort: Pulmonary effort is normal  No respiratory distress  Breath sounds: No wheezing or rales  Chest:      Chest wall: No tenderness  Abdominal:      Palpations: Abdomen is soft  Musculoskeletal:      Cervical back: Normal range of motion and neck supple  Right lower leg: No edema  Left lower leg: No edema  Skin:     General: Skin is warm and dry  Neurological:      Mental Status: She is oriented to person, place, and time     Psychiatric:         Mood and Affect: Mood normal          Behavior: Behavior normal

## 2022-08-23 DIAGNOSIS — E87.6 HYPOKALEMIA: ICD-10-CM

## 2022-08-23 RX ORDER — POTASSIUM CHLORIDE 20 MEQ/1
40 TABLET, EXTENDED RELEASE ORAL DAILY
Qty: 180 TABLET | Refills: 3 | Status: SHIPPED | OUTPATIENT
Start: 2022-08-23 | End: 2022-08-23 | Stop reason: SDUPTHER

## 2022-08-24 RX ORDER — POTASSIUM CHLORIDE 20 MEQ/1
40 TABLET, EXTENDED RELEASE ORAL DAILY
Qty: 180 TABLET | Refills: 3 | Status: SHIPPED | OUTPATIENT
Start: 2022-08-24

## 2022-10-21 DIAGNOSIS — I10 ESSENTIAL HYPERTENSION: ICD-10-CM

## 2022-10-21 RX ORDER — LOSARTAN POTASSIUM 50 MG/1
TABLET ORAL
Qty: 90 TABLET | Refills: 1 | Status: SHIPPED | OUTPATIENT
Start: 2022-10-21

## 2023-01-16 ENCOUNTER — OFFICE VISIT (OUTPATIENT)
Dept: CARDIOLOGY CLINIC | Facility: CLINIC | Age: 73
End: 2023-01-16

## 2023-01-16 VITALS
BODY MASS INDEX: 23.32 KG/M2 | WEIGHT: 140 LBS | DIASTOLIC BLOOD PRESSURE: 80 MMHG | HEART RATE: 60 BPM | HEIGHT: 65 IN | OXYGEN SATURATION: 100 % | SYSTOLIC BLOOD PRESSURE: 145 MMHG

## 2023-01-16 DIAGNOSIS — F41.9 ANXIETY DISORDER, UNSPECIFIED TYPE: ICD-10-CM

## 2023-01-16 DIAGNOSIS — I10 ESSENTIAL HYPERTENSION: Primary | ICD-10-CM

## 2023-01-16 DIAGNOSIS — I48.0 PAROXYSMAL ATRIAL FIBRILLATION (HCC): ICD-10-CM

## 2023-01-16 RX ORDER — LOSARTAN POTASSIUM 100 MG/1
100 TABLET ORAL DAILY
Qty: 90 TABLET | Refills: 3 | Status: SHIPPED | OUTPATIENT
Start: 2023-01-16

## 2023-01-16 RX ORDER — TOBRAMYCIN AND DEXAMETHASONE 3; 1 MG/ML; MG/ML
SUSPENSION/ DROPS OPHTHALMIC
COMMUNITY
Start: 2022-10-13

## 2023-01-16 RX ORDER — FLUOXETINE HYDROCHLORIDE 20 MG/1
20 CAPSULE ORAL DAILY
COMMUNITY
Start: 2022-12-30

## 2023-01-16 NOTE — LETTER
January 16, 2023     [address] [address]    Patient: Ry Dhaliwal   YOB: 1950   Date of Visit: 1/16/2023       Dear Dr Zulema Andrade: Thank you for referring Andrzej Nobles to me for evaluation  Below are my notes for this consultation  If you have questions, please do not hesitate to call me  I look forward to following your patient along with you  Sincerely,        Dar Owens MD        CC: No Recipients  Dar Owens MD  1/16/2023  2:04 PM  Sign when Signing Visit  Assessment/Plan:    Paroxysmal atrial fibrillation ([address] )  Paroxysmal atrial fibrillation status post ablation therapy  The patient is asymptomatic  She is in regular rhythm  Essential hypertension  Hypertension, higher than desirable  Patient will try losartan at 100 mg daily while continuing metoprolol succinate ER 50 mg daily  Anxiety disorder  Anxiety disorder, reasonably stable  Diagnoses and all orders for this visit:    Essential hypertension  -     losartan (COZAAR) 100 MG tablet; Take 1 tablet (100 mg total) by mouth daily    Paroxysmal atrial fibrillation (HCC)    Anxiety disorder, unspecified type    Other orders  -     FLUoxetine (PROzac) 20 mg capsule; Take 20 mg by mouth daily  -     tobramycin-dexamethasone (TOBRADEX) ophthalmic suspension; Has an infection in Right eye  Subjective: Feels well  Patient ID: Ry Dhaliwal is a 81 y o  female  The patient presented to this office for the purpose of cardiac follow-up  She is known to have a history of hypertension and paroxysmal atrial fibrillation status post ablation therapy  In addition the patient has a history of anxiety disorder  She has been feeling well from the cardiac standpoint denying any symptoms of chest pain, shortness of breath, palpitation, dizziness or lightheadedness  She has no leg edema    The patient has been checking her blood pressure at home and it has been running somewhat on the high side  Today's blood pressure in the office was 145/80  The following portions of the patient's history were reviewed and updated as appropriate: allergies, current medications, past family history, past medical history, past social history, past surgical history and problem list     Review of Systems   Respiratory: Negative for apnea, cough, chest tightness, shortness of breath and wheezing  Cardiovascular: Negative for chest pain, palpitations and leg swelling  Gastrointestinal: Negative for abdominal pain  Neurological: Negative for dizziness and light-headedness  Psychiatric/Behavioral: Negative  Objective: Slightly higher than desirable blood pressure  /80 (BP Location: Left arm, Patient Position: Sitting, Cuff Size: Standard)   Pulse 60   Ht 5' 5" (1 651 m)   Wt 63 5 kg (140 lb)   SpO2 100%   BMI 23 30 kg/m²         Physical Exam  Vitals reviewed  Constitutional:       General: She is not in acute distress  Appearance: She is well-developed  She is not diaphoretic  HENT:      Head: Normocephalic and atraumatic  Neck:      Thyroid: No thyromegaly  Vascular: No JVD  Cardiovascular:      Rate and Rhythm: Normal rate and regular rhythm  Heart sounds: S1 normal and S2 normal  No murmur heard  No friction rub  No gallop  Pulmonary:      Effort: Pulmonary effort is normal  No respiratory distress  Breath sounds: No wheezing or rales  Chest:      Chest wall: No tenderness  Abdominal:      Palpations: Abdomen is soft  Musculoskeletal:      Cervical back: Normal range of motion and neck supple  Right lower leg: No edema  Left lower leg: No edema  Skin:     General: Skin is warm and dry  Neurological:      Mental Status: She is oriented to person, place, and time     Psychiatric:         Mood and Affect: Mood normal          Behavior: Behavior normal

## 2023-01-16 NOTE — PROGRESS NOTES
Assessment/Plan:    Paroxysmal atrial fibrillation (HCC)  Paroxysmal atrial fibrillation status post ablation therapy  The patient is asymptomatic  She is in regular rhythm  Essential hypertension  Hypertension, higher than desirable  Patient will try losartan at 100 mg daily while continuing metoprolol succinate ER 50 mg daily  Anxiety disorder  Anxiety disorder, reasonably stable  Diagnoses and all orders for this visit:    Essential hypertension  -     losartan (COZAAR) 100 MG tablet; Take 1 tablet (100 mg total) by mouth daily    Paroxysmal atrial fibrillation (HCC)    Anxiety disorder, unspecified type    Other orders  -     FLUoxetine (PROzac) 20 mg capsule; Take 20 mg by mouth daily  -     tobramycin-dexamethasone (TOBRADEX) ophthalmic suspension; Has an infection in Right eye  Subjective: Feels well  Patient ID: Elvis Oconnor is a 81 y o  female  The patient presented to this office for the purpose of cardiac follow-up  She is known to have a history of hypertension and paroxysmal atrial fibrillation status post ablation therapy  In addition the patient has a history of anxiety disorder  She has been feeling well from the cardiac standpoint denying any symptoms of chest pain, shortness of breath, palpitation, dizziness or lightheadedness  She has no leg edema  The patient has been checking her blood pressure at home and it has been running somewhat on the high side  Today's blood pressure in the office was 145/80  The following portions of the patient's history were reviewed and updated as appropriate: allergies, current medications, past family history, past medical history, past social history, past surgical history and problem list     Review of Systems   Respiratory: Negative for apnea, cough, chest tightness, shortness of breath and wheezing  Cardiovascular: Negative for chest pain, palpitations and leg swelling     Gastrointestinal: Negative for abdominal pain    Neurological: Negative for dizziness and light-headedness  Psychiatric/Behavioral: Negative  Objective: Slightly higher than desirable blood pressure  /80 (BP Location: Left arm, Patient Position: Sitting, Cuff Size: Standard)   Pulse 60   Ht 5' 5" (1 651 m)   Wt 63 5 kg (140 lb)   SpO2 100%   BMI 23 30 kg/m²          Physical Exam  Vitals reviewed  Constitutional:       General: She is not in acute distress  Appearance: She is well-developed  She is not diaphoretic  HENT:      Head: Normocephalic and atraumatic  Neck:      Thyroid: No thyromegaly  Vascular: No JVD  Cardiovascular:      Rate and Rhythm: Normal rate and regular rhythm  Heart sounds: S1 normal and S2 normal  No murmur heard  No friction rub  No gallop  Pulmonary:      Effort: Pulmonary effort is normal  No respiratory distress  Breath sounds: No wheezing or rales  Chest:      Chest wall: No tenderness  Abdominal:      Palpations: Abdomen is soft  Musculoskeletal:      Cervical back: Normal range of motion and neck supple  Right lower leg: No edema  Left lower leg: No edema  Skin:     General: Skin is warm and dry  Neurological:      Mental Status: She is oriented to person, place, and time     Psychiatric:         Mood and Affect: Mood normal          Behavior: Behavior normal

## 2023-01-16 NOTE — ASSESSMENT & PLAN NOTE
Paroxysmal atrial fibrillation status post ablation therapy  The patient is asymptomatic  She is in regular rhythm

## 2023-01-16 NOTE — ASSESSMENT & PLAN NOTE
Hypertension, higher than desirable  Patient will try losartan at 100 mg daily while continuing metoprolol succinate ER 50 mg daily

## 2023-02-06 DIAGNOSIS — I48.0 PAROXYSMAL ATRIAL FIBRILLATION (HCC): ICD-10-CM

## 2023-02-07 RX ORDER — METOPROLOL SUCCINATE 50 MG/1
TABLET, EXTENDED RELEASE ORAL
Qty: 90 TABLET | Refills: 3 | Status: SHIPPED | OUTPATIENT
Start: 2023-02-07

## 2023-05-03 DIAGNOSIS — I48.0 PAROXYSMAL ATRIAL FIBRILLATION (HCC): ICD-10-CM

## 2023-05-03 RX ORDER — FLECAINIDE ACETATE 50 MG/1
TABLET ORAL
Qty: 270 TABLET | Refills: 3 | Status: SHIPPED | OUTPATIENT
Start: 2023-05-03

## 2023-06-09 NOTE — ASSESSMENT & PLAN NOTE
· Patient has a history of paroxysmal atrial fibrillation and flutter  · Status post ablation therapy with no subsequent episodes of AFib a flutter since February of 2019  · Continue flecainide  · Continue outpatient follow-up with cardiology  Quality 111:Pneumonia Vaccination Status For Older Adults: Pneumococcal vaccine (PPSV23) administered on or after patient’s 60th birthday and before the end of the measurement period Quality 226: Preventive Care And Screening: Tobacco Use: Screening And Cessation Intervention: Patient screened for tobacco use and is an ex/non-smoker Detail Level: Detailed Quality 110: Preventive Care And Screening: Influenza Immunization: Influenza Immunization Administered during Influenza season

## 2023-07-18 ENCOUNTER — OFFICE VISIT (OUTPATIENT)
Dept: CARDIOLOGY CLINIC | Facility: CLINIC | Age: 73
End: 2023-07-18
Payer: COMMERCIAL

## 2023-07-18 VITALS
HEART RATE: 57 BPM | BODY MASS INDEX: 22.66 KG/M2 | SYSTOLIC BLOOD PRESSURE: 120 MMHG | DIASTOLIC BLOOD PRESSURE: 76 MMHG | WEIGHT: 136 LBS | OXYGEN SATURATION: 98 % | HEIGHT: 65 IN

## 2023-07-18 DIAGNOSIS — F41.9 ANXIETY DISORDER, UNSPECIFIED TYPE: ICD-10-CM

## 2023-07-18 DIAGNOSIS — I48.0 PAROXYSMAL ATRIAL FIBRILLATION (HCC): Primary | ICD-10-CM

## 2023-07-18 DIAGNOSIS — E87.6 HYPOKALEMIA: ICD-10-CM

## 2023-07-18 DIAGNOSIS — I10 ESSENTIAL HYPERTENSION: ICD-10-CM

## 2023-07-18 PROCEDURE — 93000 ELECTROCARDIOGRAM COMPLETE: CPT | Performed by: INTERNAL MEDICINE

## 2023-07-18 PROCEDURE — 99213 OFFICE O/P EST LOW 20 MIN: CPT | Performed by: INTERNAL MEDICINE

## 2023-07-18 RX ORDER — VITAMIN B COMPLEX
1 CAPSULE ORAL DAILY
COMMUNITY

## 2023-07-18 RX ORDER — METOPROLOL SUCCINATE 50 MG/1
50 TABLET, EXTENDED RELEASE ORAL DAILY
Qty: 90 TABLET | Refills: 3 | Status: SHIPPED | OUTPATIENT
Start: 2023-07-18

## 2023-07-18 RX ORDER — POTASSIUM CHLORIDE 20 MEQ/1
40 TABLET, EXTENDED RELEASE ORAL DAILY
Qty: 180 TABLET | Refills: 3 | Status: SHIPPED | OUTPATIENT
Start: 2023-07-18

## 2023-07-18 RX ORDER — LOSARTAN POTASSIUM 100 MG/1
100 TABLET ORAL DAILY
Qty: 90 TABLET | Refills: 3 | Status: SHIPPED | OUTPATIENT
Start: 2023-07-18

## 2023-07-18 RX ORDER — FLECAINIDE ACETATE 50 MG/1
75 TABLET ORAL 2 TIMES DAILY
Qty: 270 TABLET | Refills: 3 | Status: SHIPPED | OUTPATIENT
Start: 2023-07-18

## 2023-07-18 NOTE — ASSESSMENT & PLAN NOTE
History of paroxysmal atrial fibrillation status post ablation therapy, stable. The patient is asymptomatic at this time. EKG done today showed sinus mechanism with first-degree AV block otherwise normal EKG.

## 2023-07-18 NOTE — LETTER
July 18, 2023     Brigido Moreno, 1915 [address] [place]  [address] [address] [facility] [address] [id number]    Patient: Kandis King   YOB: 1950   Date of Visit: 7/18/2023       Dear Dr. Ugalde Foot: Thank you for referring Terrance Mckeon to me for evaluation. Below are my notes for this consultation. If you have questions, please do not hesitate to call me. I look forward to following your patient along with you. Sincerely,        Vanessa Matos MD        CC: No Recipients    Vanessa Matos MD  7/18/2023  1:35 PM  Sign when Signing Visit  Assessment/Plan:    Paroxysmal atrial fibrillation ([address])  History of paroxysmal atrial fibrillation status post ablation therapy, stable. The patient is asymptomatic at this time. EKG done today showed sinus mechanism with first-degree AV block otherwise normal EKG. Essential hypertension  Hypertension, stable and adequately controlled. Anxiety disorder  Anxiety disorder, reasonably stable. Diagnoses and all orders for this visit:    Paroxysmal atrial fibrillation (HCC)  -     POCT ECG  -     flecainide (TAMBOCOR) 50 mg tablet; Take 1.5 tablets (75 mg total) by mouth 2 (two) times a day  -     metoprolol succinate (TOPROL-XL) 50 mg 24 hr tablet; Take 1 tablet (50 mg total) by mouth daily    Essential hypertension  -     losartan (COZAAR) 100 MG tablet; Take 1 tablet (100 mg total) by mouth daily    Anxiety disorder, unspecified type    Hypokalemia  -     potassium chloride (K-DUR,KLOR-CON) 20 mEq tablet; Take 2 tablets (40 mEq total) by mouth daily    Other orders  -     b complex vitamins capsule; Take 1 capsule by mouth daily         Subjective: Feels well from the cardiac standpoint. Patient ID: Kandis King is a 72 y.o. female. The patient presented to this office for the purpose of cardiac follow-up. She is known to have a history of hypertension and paroxysmal atrial fibrillation as well as an element of anxiety disorder.   The patient has been feeling well. She denies any symptoms of chest pain or shortness of breath. She has no symptoms of palpitation, dizziness or syncope. She has no leg edema. The patient is known to have chronic low level of anemia. And she is scheduled for follow-up lab in August.      The following portions of the patient's history were reviewed and updated as appropriate: allergies, current medications, past family history, past medical history, past social history, past surgical history and problem list.    Review of Systems   Respiratory: Negative for apnea, cough, chest tightness, shortness of breath and wheezing. Cardiovascular: Negative for chest pain, palpitations and leg swelling. Gastrointestinal: Negative for abdominal pain. Neurological: Negative for dizziness and light-headedness. Psychiatric/Behavioral: Negative. Objective: Stable cardiac wise. /76 (BP Location: Left arm, Patient Position: Sitting, Cuff Size: Standard)   Pulse 57   Ht 5' 5" (1.651 m)   Wt 61.7 kg (136 lb)   SpO2 98%   BMI 22.63 kg/m²         Physical Exam  Vitals reviewed. Constitutional:       General: She is not in acute distress. Appearance: She is well-developed. She is not diaphoretic. HENT:      Head: Normocephalic and atraumatic. Neck:      Thyroid: No thyromegaly. Vascular: No JVD. Cardiovascular:      Rate and Rhythm: Normal rate and regular rhythm. Heart sounds: S1 normal and S2 normal. No murmur heard. No friction rub. No gallop. Pulmonary:      Effort: Pulmonary effort is normal. No respiratory distress. Breath sounds: No wheezing or rales. Chest:      Chest wall: No tenderness. Abdominal:      Palpations: Abdomen is soft. Musculoskeletal:      Cervical back: Normal range of motion and neck supple. Right lower leg: No edema. Left lower leg: No edema. Skin:     General: Skin is warm and dry.    Neurological:      Mental Status: She is oriented to person, place, and time.    Psychiatric:         Mood and Affect: Mood normal.         Behavior: Behavior normal.

## 2023-07-18 NOTE — PROGRESS NOTES
Assessment/Plan:    Paroxysmal atrial fibrillation (HCC)  History of paroxysmal atrial fibrillation status post ablation therapy, stable. The patient is asymptomatic at this time. EKG done today showed sinus mechanism with first-degree AV block otherwise normal EKG. Essential hypertension  Hypertension, stable and adequately controlled. Anxiety disorder  Anxiety disorder, reasonably stable. Diagnoses and all orders for this visit:    Paroxysmal atrial fibrillation (HCC)  -     POCT ECG  -     flecainide (TAMBOCOR) 50 mg tablet; Take 1.5 tablets (75 mg total) by mouth 2 (two) times a day  -     metoprolol succinate (TOPROL-XL) 50 mg 24 hr tablet; Take 1 tablet (50 mg total) by mouth daily    Essential hypertension  -     losartan (COZAAR) 100 MG tablet; Take 1 tablet (100 mg total) by mouth daily    Anxiety disorder, unspecified type    Hypokalemia  -     potassium chloride (K-DUR,KLOR-CON) 20 mEq tablet; Take 2 tablets (40 mEq total) by mouth daily    Other orders  -     b complex vitamins capsule; Take 1 capsule by mouth daily          Subjective: Feels well from the cardiac standpoint. Patient ID: Gino Moreira is a 72 y.o. female. The patient presented to this office for the purpose of cardiac follow-up. She is known to have a history of hypertension and paroxysmal atrial fibrillation as well as an element of anxiety disorder. The patient has been feeling well. She denies any symptoms of chest pain or shortness of breath. She has no symptoms of palpitation, dizziness or syncope. She has no leg edema. The patient is known to have chronic low level of anemia.   And she is scheduled for follow-up lab in August.      The following portions of the patient's history were reviewed and updated as appropriate: allergies, current medications, past family history, past medical history, past social history, past surgical history and problem list.    Review of Systems   Respiratory: Negative for apnea, cough, chest tightness, shortness of breath and wheezing. Cardiovascular: Negative for chest pain, palpitations and leg swelling. Gastrointestinal: Negative for abdominal pain. Neurological: Negative for dizziness and light-headedness. Psychiatric/Behavioral: Negative. Objective: Stable cardiac wise. /76 (BP Location: Left arm, Patient Position: Sitting, Cuff Size: Standard)   Pulse 57   Ht 5' 5" (1.651 m)   Wt 61.7 kg (136 lb)   SpO2 98%   BMI 22.63 kg/m²          Physical Exam  Vitals reviewed. Constitutional:       General: She is not in acute distress. Appearance: She is well-developed. She is not diaphoretic. HENT:      Head: Normocephalic and atraumatic. Neck:      Thyroid: No thyromegaly. Vascular: No JVD. Cardiovascular:      Rate and Rhythm: Normal rate and regular rhythm. Heart sounds: S1 normal and S2 normal. No murmur heard. No friction rub. No gallop. Pulmonary:      Effort: Pulmonary effort is normal. No respiratory distress. Breath sounds: No wheezing or rales. Chest:      Chest wall: No tenderness. Abdominal:      Palpations: Abdomen is soft. Musculoskeletal:      Cervical back: Normal range of motion and neck supple. Right lower leg: No edema. Left lower leg: No edema. Skin:     General: Skin is warm and dry. Neurological:      Mental Status: She is oriented to person, place, and time.    Psychiatric:         Mood and Affect: Mood normal.         Behavior: Behavior normal.

## 2023-08-16 ENCOUNTER — OFFICE VISIT (OUTPATIENT)
Dept: URGENT CARE | Age: 73
End: 2023-08-16
Payer: COMMERCIAL

## 2023-08-16 ENCOUNTER — APPOINTMENT (OUTPATIENT)
Dept: RADIOLOGY | Age: 73
End: 2023-08-16
Payer: COMMERCIAL

## 2023-08-16 VITALS
HEIGHT: 65 IN | RESPIRATION RATE: 16 BRPM | OXYGEN SATURATION: 99 % | HEART RATE: 73 BPM | BODY MASS INDEX: 22.63 KG/M2 | TEMPERATURE: 97.7 F

## 2023-08-16 DIAGNOSIS — T14.90XA INJURY: ICD-10-CM

## 2023-08-16 DIAGNOSIS — S63.642A SPRAIN OF METACARPOPHALANGEAL (MCP) JOINT OF LEFT THUMB, INITIAL ENCOUNTER: Primary | ICD-10-CM

## 2023-08-16 PROCEDURE — 73140 X-RAY EXAM OF FINGER(S): CPT

## 2023-08-16 PROCEDURE — 99213 OFFICE O/P EST LOW 20 MIN: CPT | Performed by: NURSE PRACTITIONER

## 2023-08-16 PROCEDURE — S9083 URGENT CARE CENTER GLOBAL: HCPCS | Performed by: NURSE PRACTITIONER

## 2023-08-16 NOTE — PATIENT INSTRUCTIONS
No fractures seen on x-ray. Radiology does the final read; if they see anything I did not, we will call you. Finger Sprain   AMBULATORY CARE:   A finger sprain  happens when ligaments in your finger or thumb are stretched or torn. Ligaments are the tough tissues that connect bones. Ligaments allow your hands to grasp and pinch. Common symptoms include the following:   Bruising or changes in skin color    Pain and stiffness    Swelling and tenderness    Seek care immediately if:   The skin on your injured finger looks bluish or pale (less color than normal). You have new weakness or numbness in your finger or thumb. You have a splint that you cannot adjust and it feels too tight. Call your doctor if:   You have new or increased swelling or pain in your finger. You have new or increased stiffness when you move your injured finger. You have questions or concerns about your injury or treatment. Treatment for a finger sprain  may include prescription pain medicine. Ask your healthcare provider how to take this medicine safely. Some prescription pain medicines contain acetaminophen. Do not take other medicines that contain acetaminophen without talking to your healthcare provider. Too much acetaminophen may cause liver damage. Prescription pain medicine may cause constipation. Ask your healthcare provider how to prevent or treat constipation. Care for a finger sprain:   Rest your finger for at least 48 hours. Do not do activities that cause pain. Return to normal activities as directed. Apply ice on your finger to help decrease pain and swelling. Use an ice pack, or put crushed ice in a plastic bag. Cover the bag with a towel before you place it on your finger. Apply ice every hour for 15 to 20 minutes at a time. You may need to apply ice at least 4 to 8 times each day. Continue for as many days as directed. Elevate (raise) your finger above the level of your heart as often as you can.   This will help decrease swelling and pain. You can elevate your hand by resting it on a pillow. Use a splint or compression as directed. Compression (tight hold) helps support your finger or thumb as it heals. Tape your injured finger to the finger beside it. Severe sprains may be treated with a splint. A splint prevents your finger from moving while it heals. Ask how long you must wear the splint or tape, and how to apply them. Do exercises as directed. You may be given gentle exercises to begin in a few days. Exercises can help decrease stiffness in your finger or thumb. Exercises also help decrease pain and swelling and improve the movement of your finger or thumb. Check with your healthcare provider before you return to your normal activities or sports. Follow up with your doctor as directed:  Write down your questions so you remember to ask them during your visits. © Copyright [employer] 2022 Information is for End User's use only and may not be sold, redistributed or otherwise used for commercial purposes. The above information is an  only. It is not intended as medical advice for individual conditions or treatments. Talk to your doctor, nurse or pharmacist before following any medical regimen to see if it is safe and effective for you.

## 2023-08-17 NOTE — PROGRESS NOTES
[facility]        NAME: Jayne Schwartz is a 72 y.o. female  : 1950    MRN: [id number]  DATE: 2023  TIME: 8:29 PM      Assessment and Plan     Sprain of metacarpophalangeal (MCP) joint of left thumb, initial encounter [S63.642A]  1. Sprain of metacarpophalangeal (MCP) joint of left thumb, initial encounter  XR thumb left first digit-min 2v    Ambulatory Referral to Orthopedic Surgery        Fitted for left thumb-o-prene by myself, tolerated well, noted good improvement in pain with brace on. Patient Instructions     Patient Instructions     No fractures seen on x-ray. Radiology does the final read; if they see anything I did not, we will call you. Finger Sprain   AMBULATORY CARE:   A finger sprain  happens when ligaments in your finger or thumb are stretched or torn. Ligaments are the tough tissues that connect bones. Ligaments allow your hands to grasp and pinch. Common symptoms include the following:   • Bruising or changes in skin color    • Pain and stiffness    • Swelling and tenderness    Seek care immediately if:   • The skin on your injured finger looks bluish or pale (less color than normal). • You have new weakness or numbness in your finger or thumb. • You have a splint that you cannot adjust and it feels too tight. Call your doctor if:   • You have new or increased swelling or pain in your finger. • You have new or increased stiffness when you move your injured finger. • You have questions or concerns about your injury or treatment. Treatment for a finger sprain  may include prescription pain medicine. Ask your healthcare provider how to take this medicine safely. Some prescription pain medicines contain acetaminophen. Do not take other medicines that contain acetaminophen without talking to your healthcare provider. Too much acetaminophen may cause liver damage. Prescription pain medicine may cause constipation.  Ask your healthcare provider how to prevent or treat constipation. Care for a finger sprain:   • Rest your finger for at least 48 hours. Do not do activities that cause pain. Return to normal activities as directed. • Apply ice on your finger to help decrease pain and swelling. Use an ice pack, or put crushed ice in a plastic bag. Cover the bag with a towel before you place it on your finger. Apply ice every hour for 15 to 20 minutes at a time. You may need to apply ice at least 4 to 8 times each day. Continue for as many days as directed. • Elevate (raise) your finger above the level of your heart as often as you can. This will help decrease swelling and pain. You can elevate your hand by resting it on a pillow. • Use a splint or compression as directed. Compression (tight hold) helps support your finger or thumb as it heals. Tape your injured finger to the finger beside it. Severe sprains may be treated with a splint. A splint prevents your finger from moving while it heals. Ask how long you must wear the splint or tape, and how to apply them. • Do exercises as directed. You may be given gentle exercises to begin in a few days. Exercises can help decrease stiffness in your finger or thumb. Exercises also help decrease pain and swelling and improve the movement of your finger or thumb. Check with your healthcare provider before you return to your normal activities or sports. Follow up with your doctor as directed:  Write down your questions so you remember to ask them during your visits. © Copyright [employer] 2022 Information is for End User's use only and may not be sold, redistributed or otherwise used for commercial purposes. The above information is an  only. It is not intended as medical advice for individual conditions or treatments. Talk to your doctor, nurse or pharmacist before following any medical regimen to see if it is safe and effective for you. Follow up with PCP in 3-5 days.   Proceed to ER if symptoms worsen. Chief Complaint     Chief Complaint   Patient presents with   • Thumb Injury     Tripped on steps on Friday and landed on her left thumb. .. thought it would be ok. .. seems worse. . limited range and pain. Todd Mustafa History of Present Illness     [employer]er on Friday lading on outstretched hand. Thumb pain seems to be getting worse not better. Has been trying to minimize movement to help with pain. Tried an otc hand compression sleeve which increased pain. Review of Systems     Review of Systems   Musculoskeletal: Positive for arthralgias. All other systems reviewed and are negative.         Current Medications       Current Outpatient Medications:   •  albuterol (PROVENTIL HFA,VENTOLIN HFA) 90 mcg/act inhaler, Inhale 2 puffs every 6 (six) hours as needed for wheezing, Disp: , Rfl:   •  b complex vitamins capsule, Take 1 capsule by mouth daily, Disp: , Rfl:   •  Cholecalciferol (VITAMIN D3) 1000 UNITS CAPS, Take by mouth daily, Disp: , Rfl:   •  estradiol (ESTRACE) 1 mg tablet, Take 1 mg by mouth 3 (three) times a week , Disp: , Rfl:   •  ferrous sulfate 325 (65 Fe) mg tablet, Take 325 mg by mouth as needed, Disp: , Rfl:   •  flecainide (TAMBOCOR) 50 mg tablet, Take 1.5 tablets (75 mg total) by mouth 2 (two) times a day, Disp: 270 tablet, Rfl: 3  •  FLUoxetine (PROzac) 20 mg capsule, Take 20 mg by mouth daily, Disp: , Rfl:   •  FLUoxetine (PROzac) 40 MG capsule, Take 40 mg by mouth daily, Disp: , Rfl:   •  loperamide (IMODIUM A-D) 2 MG tablet, Take 2 mg by mouth as needed for diarrhea, Disp: , Rfl:   •  LORazepam (ATIVAN) 0.5 mg tablet, Take 0.5 mg by mouth every 6 (six) hours as needed for anxiety, Disp: , Rfl:   •  losartan (COZAAR) 100 MG tablet, Take 1 tablet (100 mg total) by mouth daily, Disp: 90 tablet, Rfl: 3  •  metoprolol succinate (TOPROL-XL) 50 mg 24 hr tablet, Take 1 tablet (50 mg total) by mouth daily, Disp: 90 tablet, Rfl: 3  •  Oxymetazoline HCl (Rhofade) 1 % CREA, Apply topically Use for rosacea, Disp: , Rfl:   •  potassium chloride (K-DUR,KLOR-CON) 20 mEq tablet, Take 2 tablets (40 mEq total) by mouth daily, Disp: 180 tablet, Rfl: 3  •  Qvar RediHaler 80 MCG/ACT inhaler, 2 puffs 2 (two) times a day, Disp: , Rfl:   •  beclomethasone (QVAR) 40 MCG/ACT inhaler, Inhale 1 puff 2 (two) times a day, Disp: , Rfl:   •  famotidine (PEPCID) 20 mg tablet, Take 1 tablet (20 mg total) by mouth every 12 (twelve) hours (Patient not taking: Reported on 8/16/2023), Disp: 60 tablet, Rfl: 0  •  pantoprazole (PROTONIX) 40 mg tablet, TAKE 1 TABLET(40 MG) BY MOUTH DAILY (Patient not taking: Reported on 8/16/2023), Disp: 30 tablet, Rfl: 3  •  tobramycin-dexamethasone (TOBRADEX) ophthalmic suspension, Has an infection in Right eye. (Patient not taking: Reported on 8/16/2023), Disp: , Rfl:     Current Allergies     Allergies as of 08/16/2023 - Reviewed 08/16/2023   Allergen Reaction Noted   • Bactrim [sulfamethoxazole-trimethoprim]  10/01/2016   • Wellbutrin [bupropion]  10/01/2016              The following portions of the patient's history were reviewed and updated as appropriate: allergies, current medications, past family history, past medical history, past social history, past surgical history and problem list.     Past Medical History:   Diagnosis Date   • Anxiety    • Asthma    • Atrial fibrillation ([address])    • Bowel obstruction ([address])    • Hypertension    • PAF (paroxysmal atrial fibrillation) ([address])    • Von Willebrand disease ([address])        Past Surgical History:   Procedure Laterality Date   • BUNIONECTOMY     • HEMORROIDECTOMY     • HYSTERECTOMY     • POLYPECTOMY         Family History   Problem Relation Age of Onset   • Heart attack Father    • Heart attack Sister          Medications have been verified. Objective     Pulse 73   Temp 97.7 °F (36.5 °C)   Resp 16   Ht 5' 5" (1.651 m)   SpO2 99%   BMI 22.63 kg/m²   No LMP recorded. Patient has had a hysterectomy.          Physical Exam Physical Exam  Vitals and nursing note reviewed. Constitutional:       General: She is not in acute distress. Appearance: Normal appearance. She is well-developed. She is not ill-appearing, toxic-appearing or diaphoretic. HENT:      Head: Normocephalic and atraumatic. Eyes:      Pupils: Pupils are equal, round, and reactive to light. Pulmonary:      Effort: Pulmonary effort is normal. No respiratory distress. Abdominal:      General: There is no distension. Palpations: Abdomen is soft. Musculoskeletal:         General: Swelling, tenderness and signs of injury present. Normal range of motion. Left hand: Swelling, tenderness and bony tenderness (focal over 1st MCP. Some more mild throughout thumb) present. No deformity or lacerations. Normal range of motion. Normal strength. Normal sensation. There is no disruption of two-point discrimination. Normal capillary refill. Normal pulse. Cervical back: Normal range of motion and neck supple. Skin:     General: Skin is warm and dry. Capillary Refill: Capillary refill takes less than 2 seconds. Findings: Ecchymosis (overlying left 1st metacarpal) present. Neurological:      General: No focal deficit present. Mental Status: She is alert and oriented to person, place, and time. Psychiatric:         Mood and Affect: Mood normal.         Behavior: Behavior normal.         Thought Content:  Thought content normal.         Judgment: Judgment normal.

## 2023-08-25 ENCOUNTER — TELEPHONE (OUTPATIENT)
Age: 73
End: 2023-08-25

## 2023-08-25 NOTE — TELEPHONE ENCOUNTER
Patient is being referred to a orthopedics. Please schedule accordingly.     [facility]   [phone]

## 2024-01-23 ENCOUNTER — OFFICE VISIT (OUTPATIENT)
Dept: CARDIOLOGY CLINIC | Facility: CLINIC | Age: 74
End: 2024-01-23
Payer: COMMERCIAL

## 2024-01-23 VITALS
DIASTOLIC BLOOD PRESSURE: 84 MMHG | SYSTOLIC BLOOD PRESSURE: 126 MMHG | OXYGEN SATURATION: 99 % | BODY MASS INDEX: 23.66 KG/M2 | HEIGHT: 65 IN | HEART RATE: 57 BPM | WEIGHT: 142 LBS

## 2024-01-23 DIAGNOSIS — I48.0 PAROXYSMAL ATRIAL FIBRILLATION (HCC): Primary | ICD-10-CM

## 2024-01-23 DIAGNOSIS — I10 ESSENTIAL HYPERTENSION: ICD-10-CM

## 2024-01-23 PROCEDURE — 99213 OFFICE O/P EST LOW 20 MIN: CPT | Performed by: INTERNAL MEDICINE

## 2024-01-23 NOTE — ASSESSMENT & PLAN NOTE
Paroxysmal atrial fibrillation, stable.  The patient is currently in regular rhythm and has been asymptomatic.

## 2024-01-23 NOTE — PROGRESS NOTES
"Assessment/Plan:    Paroxysmal atrial fibrillation (HCC)  Paroxysmal atrial fibrillation, stable.  The patient is currently in regular rhythm and has been asymptomatic.    Essential hypertension  Hypertension, stable and adequately controlled.       Diagnoses and all orders for this visit:    Paroxysmal atrial fibrillation (HCC)    Essential hypertension          Subjective: Feels well.     Patient ID: Ari Parsons is a 73 y.o. female.    The patient presented to this office for the purpose of cardiac follow-up.  She is known to have a history of paroxysmal atrial fibrillation with hypertension and hyperlipidemia.  The patient has been feeling rather well from the cardiac standpoint denying any symptoms of chest pain, shortness of breath, palpitation, dizziness or lightheadedness.  She has no leg edema.        The following portions of the patient's history were reviewed and updated as appropriate: allergies, current medications, past family history, past medical history, past social history, past surgical history, and problem list.    Review of Systems   Respiratory:  Negative for apnea, cough, chest tightness, shortness of breath and wheezing.    Cardiovascular:  Negative for chest pain, palpitations and leg swelling.   Gastrointestinal:  Negative for abdominal pain.   Neurological:  Negative for dizziness and light-headedness.   Psychiatric/Behavioral: Negative.           Objective: Stable cardiac wise.      /84 (BP Location: Left arm, Patient Position: Sitting, Cuff Size: Standard)   Pulse 57   Ht 5' 5\" (1.651 m)   Wt 64.4 kg (142 lb)   SpO2 99%   BMI 23.63 kg/m²          Physical Exam  Vitals reviewed.   Constitutional:       General: She is not in acute distress.     Appearance: She is well-developed. She is not diaphoretic.   HENT:      Head: Normocephalic and atraumatic.   Neck:      Thyroid: No thyromegaly.      Vascular: No JVD.   Cardiovascular:      Rate and Rhythm: Normal rate and regular " rhythm.      Heart sounds: S1 normal and S2 normal. No murmur heard.     No friction rub. No gallop.   Pulmonary:      Effort: Pulmonary effort is normal. No respiratory distress.      Breath sounds: No wheezing or rales.   Chest:      Chest wall: No tenderness.   Abdominal:      Palpations: Abdomen is soft.   Musculoskeletal:      Cervical back: Normal range of motion and neck supple.      Right lower leg: No edema.      Left lower leg: No edema.   Skin:     General: Skin is warm and dry.   Neurological:      Mental Status: She is oriented to person, place, and time.   Psychiatric:         Mood and Affect: Mood normal.         Behavior: Behavior normal.

## 2024-02-14 ENCOUNTER — TRANSCRIBE ORDERS (OUTPATIENT)
Dept: LAB | Facility: CLINIC | Age: 74
End: 2024-02-14

## 2024-02-14 ENCOUNTER — APPOINTMENT (OUTPATIENT)
Dept: LAB | Facility: CLINIC | Age: 74
End: 2024-02-14
Payer: COMMERCIAL

## 2024-02-14 DIAGNOSIS — E55.9 VITAMIN D DEFICIENCY, UNSPECIFIED: ICD-10-CM

## 2024-02-14 DIAGNOSIS — D68.00 VON WILLEBRAND DISEASE (HCC): ICD-10-CM

## 2024-02-14 DIAGNOSIS — E55.9 VITAMIN D DEFICIENCY, UNSPECIFIED: Primary | ICD-10-CM

## 2024-02-14 LAB
25(OH)D3 SERPL-MCNC: 32.3 NG/ML (ref 30–100)
ALBUMIN SERPL BCP-MCNC: 3.9 G/DL (ref 3.5–5)
ALP SERPL-CCNC: 57 U/L (ref 34–104)
ALT SERPL W P-5'-P-CCNC: 22 U/L (ref 7–52)
ANION GAP SERPL CALCULATED.3IONS-SCNC: 6 MMOL/L
AST SERPL W P-5'-P-CCNC: 21 U/L (ref 13–39)
BILIRUB SERPL-MCNC: 0.33 MG/DL (ref 0.2–1)
BUN SERPL-MCNC: 16 MG/DL (ref 5–25)
CALCIUM SERPL-MCNC: 8.9 MG/DL (ref 8.4–10.2)
CHLORIDE SERPL-SCNC: 104 MMOL/L (ref 96–108)
CHOLEST SERPL-MCNC: 170 MG/DL
CO2 SERPL-SCNC: 24 MMOL/L (ref 21–32)
CREAT SERPL-MCNC: 1 MG/DL (ref 0.6–1.3)
ERYTHROCYTE [DISTWIDTH] IN BLOOD BY AUTOMATED COUNT: 14.8 % (ref 11.6–15.1)
GFR SERPL CREATININE-BSD FRML MDRD: 56 ML/MIN/1.73SQ M
GLUCOSE P FAST SERPL-MCNC: 89 MG/DL (ref 65–99)
HCT VFR BLD AUTO: 35.9 % (ref 34.8–46.1)
HDLC SERPL-MCNC: 78 MG/DL
HGB BLD-MCNC: 11.7 G/DL (ref 11.5–15.4)
LDLC SERPL CALC-MCNC: 70 MG/DL (ref 0–100)
MCH RBC QN AUTO: 31.8 PG (ref 26.8–34.3)
MCHC RBC AUTO-ENTMCNC: 32.6 G/DL (ref 31.4–37.4)
MCV RBC AUTO: 98 FL (ref 82–98)
NONHDLC SERPL-MCNC: 92 MG/DL
PLATELET # BLD AUTO: 252 THOUSANDS/UL (ref 149–390)
PMV BLD AUTO: 10.6 FL (ref 8.9–12.7)
POTASSIUM SERPL-SCNC: 4.5 MMOL/L (ref 3.5–5.3)
PROT SERPL-MCNC: 6.7 G/DL (ref 6.4–8.4)
RBC # BLD AUTO: 3.68 MILLION/UL (ref 3.81–5.12)
SODIUM SERPL-SCNC: 134 MMOL/L (ref 135–147)
TRIGL SERPL-MCNC: 109 MG/DL
WBC # BLD AUTO: 6.76 THOUSAND/UL (ref 4.31–10.16)

## 2024-02-14 PROCEDURE — 85027 COMPLETE CBC AUTOMATED: CPT

## 2024-02-14 PROCEDURE — 80061 LIPID PANEL: CPT

## 2024-02-14 PROCEDURE — 82306 VITAMIN D 25 HYDROXY: CPT

## 2024-02-14 PROCEDURE — 86803 HEPATITIS C AB TEST: CPT

## 2024-02-14 PROCEDURE — 36415 COLL VENOUS BLD VENIPUNCTURE: CPT

## 2024-02-14 PROCEDURE — 80053 COMPREHEN METABOLIC PANEL: CPT

## 2024-02-15 LAB — HCV AB SER QL: NORMAL

## 2024-03-01 ENCOUNTER — APPOINTMENT (OUTPATIENT)
Dept: LAB | Facility: CLINIC | Age: 74
End: 2024-03-01
Payer: COMMERCIAL

## 2024-03-01 DIAGNOSIS — D51.0 PERNICIOUS ANEMIA: ICD-10-CM

## 2024-03-01 DIAGNOSIS — D64.9 ANEMIA, UNSPECIFIED TYPE: ICD-10-CM

## 2024-03-01 LAB
ERYTHROCYTE [DISTWIDTH] IN BLOOD BY AUTOMATED COUNT: 16.7 % (ref 11.6–15.1)
FERRITIN SERPL-MCNC: 21 NG/ML (ref 11–307)
HCT VFR BLD AUTO: 25.4 % (ref 34.8–46.1)
HGB BLD-MCNC: 8 G/DL (ref 11.5–15.4)
IRON SATN MFR SERPL: 4 % (ref 15–50)
IRON SERPL-MCNC: 14 UG/DL (ref 50–212)
MCH RBC QN AUTO: 32.4 PG (ref 26.8–34.3)
MCHC RBC AUTO-ENTMCNC: 31.5 G/DL (ref 31.4–37.4)
MCV RBC AUTO: 103 FL (ref 82–98)
PLATELET # BLD AUTO: 386 THOUSANDS/UL (ref 149–390)
PMV BLD AUTO: 9.9 FL (ref 8.9–12.7)
RBC # BLD AUTO: 2.47 MILLION/UL (ref 3.81–5.12)
TIBC SERPL-MCNC: 373 UG/DL (ref 250–450)
UIBC SERPL-MCNC: 359 UG/DL (ref 155–355)
VIT B12 SERPL-MCNC: 522 PG/ML (ref 180–914)
WBC # BLD AUTO: 7.13 THOUSAND/UL (ref 4.31–10.16)

## 2024-03-01 PROCEDURE — 36415 COLL VENOUS BLD VENIPUNCTURE: CPT

## 2024-03-01 PROCEDURE — 82607 VITAMIN B-12: CPT

## 2024-03-01 PROCEDURE — 85027 COMPLETE CBC AUTOMATED: CPT

## 2024-03-01 PROCEDURE — 82728 ASSAY OF FERRITIN: CPT

## 2024-03-01 PROCEDURE — 83550 IRON BINDING TEST: CPT

## 2024-03-01 PROCEDURE — 83540 ASSAY OF IRON: CPT

## 2024-03-05 ENCOUNTER — APPOINTMENT (OUTPATIENT)
Dept: LAB | Facility: CLINIC | Age: 74
End: 2024-03-05
Payer: COMMERCIAL

## 2024-03-05 DIAGNOSIS — D50.9 IRON DEFICIENCY ANEMIA, UNSPECIFIED IRON DEFICIENCY ANEMIA TYPE: ICD-10-CM

## 2024-03-05 LAB
BASOPHILS # BLD AUTO: 0.06 THOUSANDS/ÂΜL (ref 0–0.1)
BASOPHILS NFR BLD AUTO: 1 % (ref 0–1)
EOSINOPHIL # BLD AUTO: 0.16 THOUSAND/ÂΜL (ref 0–0.61)
EOSINOPHIL NFR BLD AUTO: 3 % (ref 0–6)
ERYTHROCYTE [DISTWIDTH] IN BLOOD BY AUTOMATED COUNT: 15.9 % (ref 11.6–15.1)
HCT VFR BLD AUTO: 26.7 % (ref 34.8–46.1)
HGB BLD-MCNC: 8.3 G/DL (ref 11.5–15.4)
IMM GRANULOCYTES # BLD AUTO: 0.02 THOUSAND/UL (ref 0–0.2)
IMM GRANULOCYTES NFR BLD AUTO: 0 % (ref 0–2)
LYMPHOCYTES # BLD AUTO: 1.01 THOUSANDS/ÂΜL (ref 0.6–4.47)
LYMPHOCYTES NFR BLD AUTO: 17 % (ref 14–44)
MCH RBC QN AUTO: 32 PG (ref 26.8–34.3)
MCHC RBC AUTO-ENTMCNC: 31.1 G/DL (ref 31.4–37.4)
MCV RBC AUTO: 103 FL (ref 82–98)
MONOCYTES # BLD AUTO: 0.68 THOUSAND/ÂΜL (ref 0.17–1.22)
MONOCYTES NFR BLD AUTO: 12 % (ref 4–12)
NEUTROPHILS # BLD AUTO: 3.94 THOUSANDS/ÂΜL (ref 1.85–7.62)
NEUTS SEG NFR BLD AUTO: 67 % (ref 43–75)
NRBC BLD AUTO-RTO: 0 /100 WBCS
PLATELET # BLD AUTO: 398 THOUSANDS/UL (ref 149–390)
PMV BLD AUTO: 9.5 FL (ref 8.9–12.7)
RBC # BLD AUTO: 2.59 MILLION/UL (ref 3.81–5.12)
WBC # BLD AUTO: 5.87 THOUSAND/UL (ref 4.31–10.16)

## 2024-03-05 PROCEDURE — 36415 COLL VENOUS BLD VENIPUNCTURE: CPT

## 2024-03-05 PROCEDURE — 85025 COMPLETE CBC W/AUTO DIFF WBC: CPT

## 2024-03-23 NOTE — PROGRESS NOTES
Consultation Note: Hematology:  Ari Parsons 73 y.o. female MRN: [id number]  Unit/Bed#:  Encounter: [id number]    Assessment and Plan:  1. Iron Deficiency Anemia with Lack of Response to Oral Iron:   A. Secondary to Hemorrhoidal Bleedin. Thrombocytosis:  3. Macrocytosis:  Patient is a 73-year-old female, with macrocytic anemia, thrombocytosis, and iron-deficiency; who presents today for initial consultation. Hematologic-parameters, and Iron Panel are largely consistent with iron-deficiency anemia (Hemoglobin: 8.3 MCV: 103 RDW: 15.6 Platelet Count: 398 Ferritin: 21), despite oral iron-supplementation. Macrocytosis may be secondary to alcohol consumption, as Vitamin B12, and Folate levels are within normal limits. Given lack of response to oral-iron supplementation, will recommend parenteral replacement, as follows: Iron Sucrose (Venofer) 200 mg weekly (4-doses). Plan for close interval follow-up in 6-months, with repeat laboratory-studies prior to visit. In the interim, agree with re-establishment of care with Gastroenterology, for assistance with irritable bowel disease - diarrhea-type; of which is exacerbating hemorrhoidal-bleeding. Agree with consideration of further intervention for hemorrhoidal disease, as well. Patient expressed understanding and agreement.   1. Initiate parenteral iron-replacement, as follows:    A. Iron Sucrose 200 mg IV weekly (4-Doses).   2. Agree with re-establishment of care with Gastroenterology.   3. Recommend close interval follow-up in 6-months.    4. Type 2A von Willebrand Disease:  Of particular importance, patient has a well-established history of type 2A von Willebrand disease, of which is unresponsive to Desmopressin (DDAVP). She has required inpatient hospital admission for administration of Human Plasma-Derived von Willebrand Factor (Humate P), in anticipation of prior surgical-interventions. Recommend follow-up prior to any planned procedures. Patient expressed  understanding and agreement with the above plan.  1. Recommended scheduled follow-up prior to any planned procedures:  A. Will need to coordinate for administration of Humate p, prior.     Subjective:  History of Present Illness: Ari Parsons is a 73-year-old female, with an established history of hypertension, atrial-flutter (No Oral Anticoagulation or Antiplatelet Therapy), iron deficiency anemia, type 2A von Willebrand disease, and generalized anxiety disorder; who presents today for initial consultation. Historical information was obtained from patient, and prior documentation. Of note, patient was seen for initial consultation by [facility] (Arminda Marcelo M.D.) in December 2018; due to established history of type 2A von Willebrand disease. Patient was initially diagnosed by [facility], following presentation with easy bruisability, and menorrhagia. Given absence of response to desmopressin (DDAVP), patient was recommended hospital admission for administration of Human Plasma-Derived von Willebrand Factor (Humate P), prior to planned surgical-interventions. Patient has had multiple procedures, including hysterectomy, polypectomy, and hemorrhoidectomy, since then, without accompanying bleeding-complications. Thus, she was recommended the same in December 2018.     Patient now presents for initial consultation, regarding iron-deficiency anemia, unresponsive to oral iron supplementation. Of particular importance, patient has a well-established history of mild intermittent wnzcmilgdu-nw-csmiwhqadu anemia dating back to at least October 2016 (Hemoglobin: 10.6-15.4 MCV: ). The above-mentioned has evolved more recently since August 2020 (Hemoglobin: 8.0-10.3). Remaining hematologic-parameters, are largely within normal limits, with the exception of mild thrombocytosis (Platelet Count: 398). This has been intermittently present in the past,  as well. Ancillary-studies were reviewed, and are remarkable for iron-deficiency (Iron: 14 Iron Saturation: 4% TIBC: 373 Ferritin: 21). Vitamin B12 and Folate levels are within normal limits (Vitamin B12: 522 Ferritin: 21). Patient is presently on Ferrous Sulfate 325 mg Daily, and B-Complex Vitamins. Of note, last bidirectional endoscopic-evaluation, on December 3rd, 2020, showed Morales's esophagus, moderate edematous, and erythematous mucosa with erosion in the antrum, mild erythematous mucosa in the first part of the duodenum, internal and external hemorrhoids, and mild-erythema in the sigmoid colon.    Interval Events: On evaluation this morning, patient states that she is doing relatively well; although qualifies that she recently developed a viral upper respiratory illness following [employer] [place] Day celebrations. She tested negative for COVID-19 (Home-Test). On further questioning, patient describes ongoing intermittent, bright-red blood per rectum with passage of clots secondary to established hemorrhoidal disease. Patient reports prior hemorrhoidectomy (Unspecified date); although has persistent hemorrhoidal disease. The above-mentioned is largely complicated by known irritable bowel syndrome - diarrhea-type, characterized by frequent bowel movements; although patient qualifies that she often experiences hematochezia without passage of stool. Given the above-mentioned, patient applies pads, due to excessive bleeding. Patient is compliant with oral iron supplementation; and tolerates daily administration without constipation. She is anticipating re-establishment of care with Gastroenterology in April 2024. Otherwise, patient denies other adverse bleeding-events, including epistaxis, gum-bleeding, or genitourinary bleeding (Note: Easy bruisability noted, without excessive ecchymosis).    Note: Patient has an established history of atrial-flutter, not on oral anticoagulation or antiplatelet therapy due to  history of type 2A von Willebrand disease. Regarding patient's surgical history, patient reports prior hysterectomy, polypectomy, and hemorrhoidectomy without any adverse-bleeding events (With administration of Humate P). Family history, is remarkable for coronary-artery disease in her father, and sister. She is a former-smoker, and reports cessation of tobacco-use in . Patient reports frequently consumption of alcohol (e.g. White wine), near daily.    Review of Systems:  All systems reviewed and negative except otherwise listed in the History of Present Illness.    Historical Information:  Past Medical History:   Diagnosis Date    Anxiety     Asthma     Atrial fibrillation (HCC)     Bowel obstruction (HCC)     Hypertension     PAF (paroxysmal atrial fibrillation) (HCC)     Von Willebrand disease (HCC)      Past Surgical History:   Procedure Laterality Date    BUNIONECTOMY      HEMORROIDECTOMY      HYSTERECTOMY      POLYPECTOMY       Social History:  Social History     Substance and Sexual Activity   Alcohol Use Yes    Alcohol/week: 21.0 standard drinks of alcohol    Types: 21 Glasses of wine per week     Social History     Substance and Sexual Activity   Drug Use No     Social History     Tobacco Use   Smoking Status Former    Current packs/day: 0.00    Types: Cigarettes, Pipe, Cigars    Quit date:     Years since quittin.2   Smokeless Tobacco Never   Tobacco Comments    1 pack/day for 5 years, and about 1/2 pack for 30 years     E-Cigarette/Vaping     E-Cigarette/Vaping Substances     Family History: Non-Contributory.    Medications and Allergies: All Medications Reviewed.    Objective:  Vitals  Vitals:    24 0854   BP: 118/66   Pulse: 68   Resp: 18   Temp: 98.1 °F (36.7 °C)   SpO2: 98%     Physical Exam:  General: Alert, and oriented; Pleasant, and conversational; Well-Appearing Female  HEENT: Atraumatic, and normocephalic; PERRLA; EOMI; Masked  Neck: Trachea midline; No neck masses,  thyromegaly, or cervical lymphadenopathy  Cardiovascular: Regular rate and rhythm; No murmurs, rubs, or gallops appreciated  Respiratory: Clear to auscultation bilaterally; Adequate aeration; No supplemental oxygen  Abdomen: Soft, non-tender; Non-distended; No organomegaly; Bowel sounds present  Extremities: No obvious deformities; No peripheral edema; Moves all  Neurologic: Appropriately alert, and oriented to Person, Place, and Time; No focal neurologic deficits    Laboratory Studies:                  Lab Results: I have reviewed all pertinent labs.  Imaging: I have personally reviewed pertinent reports.    EKG, Pathology, and Other Studies: I have personally reviewed pertinent reports.      Patient was seen and discussed with attending physician, Arminda Marcelo M.D.    Liseth Thomas D.O.  Hematology-Oncology Fellow (PGY-4)

## 2024-03-25 ENCOUNTER — TELEPHONE (OUTPATIENT)
Dept: HEMATOLOGY ONCOLOGY | Facility: CLINIC | Age: 74
End: 2024-03-25

## 2024-03-25 ENCOUNTER — OFFICE VISIT (OUTPATIENT)
Dept: HEMATOLOGY ONCOLOGY | Facility: CLINIC | Age: 74
End: 2024-03-25
Payer: MEDICARE

## 2024-03-25 VITALS
OXYGEN SATURATION: 98 % | WEIGHT: 145 LBS | RESPIRATION RATE: 18 BRPM | DIASTOLIC BLOOD PRESSURE: 66 MMHG | TEMPERATURE: 98.1 F | BODY MASS INDEX: 24.16 KG/M2 | HEART RATE: 68 BPM | HEIGHT: 65 IN | SYSTOLIC BLOOD PRESSURE: 118 MMHG

## 2024-03-25 DIAGNOSIS — N18.30 STAGE 3 CHRONIC KIDNEY DISEASE, UNSPECIFIED WHETHER STAGE 3A OR 3B CKD (HCC): ICD-10-CM

## 2024-03-25 DIAGNOSIS — D50.0 IRON DEFICIENCY ANEMIA DUE TO CHRONIC BLOOD LOSS: ICD-10-CM

## 2024-03-25 DIAGNOSIS — D68.00 VON WILLEBRAND DISEASE (HCC): Primary | ICD-10-CM

## 2024-03-25 PROCEDURE — 99214 OFFICE O/P EST MOD 30 MIN: CPT | Performed by: INTERNAL MEDICINE

## 2024-03-25 RX ORDER — SODIUM CHLORIDE 9 MG/ML
20 INJECTION, SOLUTION INTRAVENOUS ONCE
OUTPATIENT
Start: 2024-04-08

## 2024-03-25 NOTE — TELEPHONE ENCOUNTER
What would be a preferred day of the week that would work best for your infusion appointment? Any day  Do you prefer mornings or afternoons for your appointments? PM  Are there any days or dates that do not work for your schedule, including any upcoming vacations? Avoid already scheduled appts  We are going to try our best to schedule you at the infusion center closest to your home.  In the event that we are unable to what would be your next preferred infusion site or sites? [facility]    1. [facility]  2. [facility]    Do you have transportation to take you to all of your appointments? yes

## 2024-03-25 NOTE — TELEPHONE ENCOUNTER
Left detailed message with dates, times, and, location. Can view on [employer]. Gave my Teams # to call back to confirm.

## 2024-03-26 ENCOUNTER — OFFICE VISIT (OUTPATIENT)
Dept: URGENT CARE | Age: 74
End: 2024-03-26
Payer: COMMERCIAL

## 2024-03-26 VITALS
HEART RATE: 74 BPM | SYSTOLIC BLOOD PRESSURE: 123 MMHG | DIASTOLIC BLOOD PRESSURE: 74 MMHG | TEMPERATURE: 99.9 F | OXYGEN SATURATION: 98 % | RESPIRATION RATE: 18 BRPM

## 2024-03-26 DIAGNOSIS — J06.9 UPPER RESPIRATORY TRACT INFECTION, UNSPECIFIED TYPE: Primary | ICD-10-CM

## 2024-03-26 PROCEDURE — 99213 OFFICE O/P EST LOW 20 MIN: CPT

## 2024-03-26 PROCEDURE — S9083 URGENT CARE CENTER GLOBAL: HCPCS

## 2024-03-26 RX ORDER — DM/ACETAMINOPHEN/DOXYLAMINE 10-325/15
30 LIQUID (ML) ORAL 3 TIMES DAILY PRN
Qty: 355 ML | Refills: 0 | Status: SHIPPED | OUTPATIENT
Start: 2024-03-26

## 2024-03-26 RX ORDER — IPRATROPIUM BROMIDE AND ALBUTEROL SULFATE 2.5; .5 MG/3ML; MG/3ML
3 SOLUTION RESPIRATORY (INHALATION) ONCE
Status: COMPLETED | OUTPATIENT
Start: 2024-03-26 | End: 2024-03-26

## 2024-03-26 RX ORDER — PREDNISONE 20 MG/1
20 TABLET ORAL 2 TIMES DAILY WITH MEALS
Qty: 10 TABLET | Refills: 0 | Status: SHIPPED | OUTPATIENT
Start: 2024-03-26 | End: 2024-03-31

## 2024-03-26 RX ADMIN — IPRATROPIUM BROMIDE AND ALBUTEROL SULFATE 3 ML: 2.5; .5 SOLUTION RESPIRATORY (INHALATION) at 19:47

## 2024-03-26 NOTE — PATIENT INSTRUCTIONS
Please begin Coricidin and short course of steroids as directed.   Symptoms likely viral in etiology, discussed option of COVID/Flu testing, patient declining at this time as would not impact the course of treatment.   Continue Tylenol and Ibuprofen as needed for body aches/feer.   Follow up with PCP if no relief within one week.   Go to ED for worsening symptoms.

## 2024-03-26 NOTE — PROGRESS NOTES
[facility] Now        NAME: Ari Parsons is a 73 y.o. female  : 1950    MRN: [id number]  DATE: 2024  TIME: 7:58 PM    Assessment and Plan   Upper respiratory tract infection, unspecified type [J06.9]  1. Upper respiratory tract infection, unspecified type  ipratropium-albuterol (DUO-NEB) 0.5-2.5 mg/3 mL inhalation solution 3 mL    Dextromethorphan-GG-APAP (Coricidin HBP Cold/Cough/Flu) -325 MG/15ML LIQD    predniSONE 20 mg tablet      Significant improvement on pulmonary exam after Duo-Neb  Please begin Coricidin and short course of steroids as directed.   Symptoms likely viral in etiology, discussed option of COVID/Flu testing, patient declining at this time as would not impact the course of treatment.   Continue Tylenol and Ibuprofen as needed for body aches/feer.   Follow up with PCP if no relief within one week.   Go to ED for worsening symptoms.     Patient Instructions   Upper Respiratory Infection   WHAT YOU NEED TO KNOW:   An upper respiratory infection is also called a cold. It can affect your nose, throat, ears, and sinuses. Cold symptoms are usually worst for the first 3 to 5 days. Most people get better in 7 to 14 days. You may continue to cough for 2 to 3 weeks. Colds are caused by viruses and do not get better with antibiotics.  DISCHARGE INSTRUCTIONS:   Call your local emergency number (911 in the [place]) if:   You have chest pain or trouble breathing.        Return to the emergency department if:   You have a fever over 102ºF (39ºC).        Call your doctor if:   You have a low fever.     Your sore throat gets worse or you see white or yellow spots in your throat.     Your symptoms get worse after 3 to 5 days or are not better in 14 days.     You have a rash anywhere on your skin.     You have large, tender lumps in your neck.     You have thick, green, or yellow drainage from your nose.     You cough up thick yellow, green, or bloody mucus.     You have a bad earache.      You have questions or concerns about your condition or care.     Medicines:  You may need any of the following:  Decongestants  help reduce nasal congestion and help you breathe more easily. If you take decongestant pills, they may make you feel restless or cause problems with your sleep. Do not use decongestant sprays for more than a few days.     Cough suppressants  help reduce coughing. Ask your healthcare provider which type of cough medicine is best for you.      NSAIDs , such as ibuprofen, help decrease swelling, pain, and fever. NSAIDs can cause stomach bleeding or kidney problems in certain people. If you take blood thinner medicine, always ask your healthcare provider if NSAIDs are safe for you. Always read the medicine label and follow directions.     Acetaminophen  decreases pain and fever. It is available without a doctor's order. Ask how much to take and how often to take it. Follow directions. Read the labels of all other medicines you are using to see if they also contain acetaminophen, or ask your doctor or pharmacist. Acetaminophen can cause liver damage if not taken correctly.     Take your medicine as directed.  Contact your healthcare provider if you think your medicine is not helping or if you have side effects. Tell your provider if you are allergic to any medicine. Keep a list of the medicines, vitamins, and herbs you take. Include the amounts, and when and why you take them. Bring the list or the pill bottles to follow-up visits. Carry your medicine list with you in case of an emergency.     Self-care:   Rest as much as possible.  Slowly start to do more each day.     Drink more liquids as directed.  Liquids will help thin and loosen mucus so you can cough it up. Liquids will also help prevent dehydration. Liquids that help prevent dehydration include water, fruit juice, and broth. Do not drink liquids that contain caffeine. Caffeine can increase your risk for dehydration. Ask your  healthcare provider how much liquid to drink each day.     Soothe a sore throat.  Gargle with warm salt water. Make salt water by dissolving ¼ teaspoon salt in 1 cup warm water. You may also suck on hard candy or throat lozenges. You may use a sore throat spray.     Use a humidifier or vaporizer.  Use a cool mist humidifier or a vaporizer to increase air moisture in your home. This may make it easier for you to breathe and help decrease your cough.     Use saline nasal drops as directed.  These help relieve congestion.     Apply petroleum-based jelly around the outside of your nostrils.  This can decrease irritation from blowing your nose.     Do not smoke.  Nicotine and other chemicals in cigarettes and cigars can make your symptoms worse. They can also cause infections such as bronchitis or pneumonia. Ask your healthcare provider for information if you currently smoke and need help to quit. E-cigarettes or smokeless tobacco still contain nicotine. Talk to your healthcare provider before you use these products.     Prevent a cold:   Wash your hands often.  Use soap and water every time you wash your hands. Rub your soapy hands together, lacing your fingers. Use the fingers of one hand to scrub under the nails of the other hand. Wash for at least 20 seconds. Rinse with warm, running water for several seconds. Then dry your hands. Use hand  gel if soap and water are not available. Do not touch your eyes or mouth without washing your hands first.          Cover a sneeze or cough.  Use a tissue that covers your mouth and nose. Put the used tissue in the trash right away. Use the bend of your arm if a tissue is not available. Wash your hands well with soap and water or use a hand . Do not stand close to anyone who is sneezing or coughing.     Try to stay away from others while you are sick.  This is especially important during the first 2 to 3 days when the virus is more easily spread. Wait until a  fever, cough, or other symptoms are gone before you return to work or other regular activities.     Do not share items while you are sick.  This includes food, drinks, eating utensils, and dishes.     Follow up with your doctor as directed:  Write down your questions so you remember to ask them during your visits.  © Copyright [employer] 2023 Information is for End User's use only and may not be sold, redistributed or otherwise used for commercial purposes.  The above information is an  only. It is not intended as medical advice for individual conditions or treatments. Talk to your doctor, nurse or pharmacist before following any medical regimen to see if it is safe and effective for you.       Follow up with PCP in 3-5 days.  Proceed to  ER if symptoms worsen.    Chief Complaint     Chief Complaint   Patient presents with    Cough    Shortness of Breath    Chills    Generalized Body Aches     Body ache, chills, cough and shortness of breath since last Thursday.         History of Present Illness       Cough  This is a new problem. The current episode started in the past 7 days (x 4 days). The problem has been unchanged. The problem occurs hourly. The cough is Non-productive. Associated symptoms include chills, a fever, myalgias and shortness of breath. Pertinent negatives include no chest pain, ear congestion, ear pain, eye redness, headaches, heartburn, hemoptysis, nasal congestion, postnasal drip, rash, rhinorrhea, sore throat, sweats, weight loss or wheezing. Nothing aggravates the symptoms. She has tried a beta-agonist inhaler and steroid inhaler for the symptoms. The treatment provided mild relief. Her past medical history is significant for asthma. There is no history of bronchiectasis, bronchitis, COPD, emphysema, environmental allergies or pneumonia.       Review of Systems   Review of Systems   Constitutional:  Positive for chills and fever. Negative for fatigue and weight loss.   HENT:   Positive for congestion. Negative for ear discharge, ear pain, postnasal drip, rhinorrhea, sinus pressure, sinus pain, sneezing and sore throat.    Eyes: Negative.  Negative for pain, discharge, redness and itching.   Respiratory:  Positive for cough and shortness of breath. Negative for apnea, hemoptysis, choking, chest tightness, wheezing and stridor.    Cardiovascular: Negative.  Negative for chest pain and palpitations.   Gastrointestinal: Negative.  Negative for diarrhea, heartburn, nausea and vomiting.   Endocrine: Negative.  Negative for polydipsia, polyphagia and polyuria.   Genitourinary: Negative.  Negative for decreased urine volume and flank pain.   Musculoskeletal:  Positive for myalgias. Negative for arthralgias, back pain, neck pain and neck stiffness.   Skin: Negative.  Negative for color change and rash.   Allergic/Immunologic: Negative.  Negative for environmental allergies.   Neurological: Negative.  Negative for dizziness, facial asymmetry, light-headedness, numbness and headaches.   Hematological: Negative.  Negative for adenopathy.   Psychiatric/Behavioral: Negative.           Current Medications       Current Outpatient Medications:     albuterol (PROVENTIL HFA,VENTOLIN HFA) 90 mcg/act inhaler, Inhale 2 puffs every 6 (six) hours as needed for wheezing, Disp: , Rfl:     b complex vitamins capsule, Take 1 capsule by mouth daily, Disp: , Rfl:     Cholecalciferol (VITAMIN D3) 1000 UNITS CAPS, Take by mouth daily, Disp: , Rfl:     Dextromethorphan-GG-APAP (Coricidin HBP Cold/Cough/Flu) -325 MG/15ML LIQD, Take 30 mL by mouth 3 (three) times a day as needed (cough congestion), Disp: 355 mL, Rfl: 0    estradiol (ESTRACE) 1 mg tablet, Take 1 mg by mouth 3 (three) times a week , Disp: , Rfl:     flecainide (TAMBOCOR) 50 mg tablet, Take 1.5 tablets (75 mg total) by mouth 2 (two) times a day, Disp: 270 tablet, Rfl: 3    FLUoxetine (PROzac) 40 MG capsule, Take 40 mg by mouth daily, Disp: , Rfl:      loperamide (IMODIUM A-D) 2 MG tablet, Take 2 mg by mouth as needed for diarrhea, Disp: , Rfl:     LORazepam (ATIVAN) 0.5 mg tablet, Take 0.5 mg by mouth every 6 (six) hours as needed for anxiety, Disp: , Rfl:     losartan (COZAAR) 100 MG tablet, Take 1 tablet (100 mg total) by mouth daily, Disp: 90 tablet, Rfl: 3    metoprolol succinate (TOPROL-XL) 50 mg 24 hr tablet, Take 1 tablet (50 mg total) by mouth daily, Disp: 90 tablet, Rfl: 3    potassium chloride (K-DUR,KLOR-CON) 20 mEq tablet, Take 2 tablets (40 mEq total) by mouth daily, Disp: 180 tablet, Rfl: 3    predniSONE 20 mg tablet, Take 1 tablet (20 mg total) by mouth 2 (two) times a day with meals for 5 days, Disp: 10 tablet, Rfl: 0    Qvar RediHaler 80 MCG/ACT inhaler, 2 puffs 2 (two) times a day, Disp: , Rfl:     ferrous sulfate 325 (65 Fe) mg tablet, Take 325 mg by mouth as needed (Patient not taking: Reported on 3/26/2024), Disp: , Rfl:   No current facility-administered medications for this visit.    Current Allergies     Allergies as of 03/26/2024 - Reviewed 03/26/2024   Allergen Reaction Noted    Bactrim [sulfamethoxazole-trimethoprim]  10/01/2016    Wellbutrin [bupropion]  10/01/2016            The following portions of the patient's history were reviewed and updated as appropriate: allergies, current medications, past family history, past medical history, past social history, past surgical history and problem list.     Past Medical History:   Diagnosis Date    Anxiety     Asthma     Atrial fibrillation (HCC)     Bowel obstruction (HCC)     Hypertension     PAF (paroxysmal atrial fibrillation) (HCC)     Von Willebrand disease (HCC)        Past Surgical History:   Procedure Laterality Date    BUNIONECTOMY      HEMORROIDECTOMY      HYSTERECTOMY      POLYPECTOMY         Family History   Problem Relation Age of Onset    Heart attack Father     Heart attack Sister          Medications have been verified.        Objective   /74   Pulse 74   Temp 99.9  °F (37.7 °C) (Temporal)   Resp 18   SpO2 98%        Physical Exam     Physical Exam  Vitals and nursing note reviewed.   Constitutional:       General: She is not in acute distress.     Appearance: Normal appearance. She is not ill-appearing, toxic-appearing or diaphoretic.      Interventions: She is not intubated.  HENT:      Head: Normocephalic and atraumatic.      Right Ear: Tympanic membrane normal.      Left Ear: Tympanic membrane normal.      Nose: Nose normal. No congestion or rhinorrhea.      Mouth/Throat:      Mouth: Mucous membranes are moist.      Pharynx: Oropharynx is clear. Uvula midline. No pharyngeal swelling, oropharyngeal exudate, posterior oropharyngeal erythema or uvula swelling.      Tonsils: No tonsillar exudate or tonsillar abscesses. 1+ on the right. 1+ on the left.   Eyes:      Extraocular Movements: Extraocular movements intact.      Conjunctiva/sclera: Conjunctivae normal.      Pupils: Pupils are equal, round, and reactive to light.   Neck:      Thyroid: No thyroid mass, thyromegaly or thyroid tenderness.   Cardiovascular:      Rate and Rhythm: Normal rate and regular rhythm.      Pulses: Normal pulses.      Heart sounds: Normal heart sounds, S1 normal and S2 normal. Heart sounds not distant. No murmur heard.     No friction rub. No gallop.   Pulmonary:      Effort: Pulmonary effort is normal. No tachypnea, bradypnea, accessory muscle usage, prolonged expiration, respiratory distress or retractions. She is not intubated.      Breath sounds: No stridor, decreased air movement or transmitted upper airway sounds. Examination of the right-upper field reveals wheezing and rhonchi. Examination of the left-upper field reveals wheezing and rhonchi. Examination of the right-middle field reveals wheezing. Examination of the left-middle field reveals wheezing. Wheezing and rhonchi present. No decreased breath sounds or rales.      Comments: Diffuse wheezing throughout all lung fields.   Abdominal:       General: Bowel sounds are normal.      Palpations: Abdomen is soft.      Tenderness: There is no abdominal tenderness. There is no guarding or rebound.   Musculoskeletal:         General: Normal range of motion.      Cervical back: Full passive range of motion without pain, normal range of motion and neck supple. No tenderness. No spinous process tenderness or muscular tenderness.   Lymphadenopathy:      Cervical: No cervical adenopathy.      Right cervical: No superficial cervical adenopathy.     Left cervical: No superficial cervical adenopathy.   Skin:     General: Skin is warm and dry.      Capillary Refill: Capillary refill takes less than 2 seconds.   Neurological:      General: No focal deficit present.      Mental Status: She is alert and oriented to person, place, and time.      Cranial Nerves: No cranial nerve deficit.   Psychiatric:         Mood and Affect: Mood normal.         Behavior: Behavior normal.

## 2024-04-11 ENCOUNTER — HOSPITAL ENCOUNTER (OUTPATIENT)
Dept: INFUSION CENTER | Facility: CLINIC | Age: 74
Discharge: HOME | End: 2024-04-11
Payer: COMMERCIAL

## 2024-04-11 VITALS
DIASTOLIC BLOOD PRESSURE: 60 MMHG | OXYGEN SATURATION: 100 % | TEMPERATURE: 96.7 F | RESPIRATION RATE: 18 BRPM | SYSTOLIC BLOOD PRESSURE: 97 MMHG | HEART RATE: 63 BPM

## 2024-04-11 DIAGNOSIS — D50.0 IRON DEFICIENCY ANEMIA DUE TO CHRONIC BLOOD LOSS: Primary | ICD-10-CM

## 2024-04-11 PROCEDURE — 96365 THER/PROPH/DIAG IV INF INIT: CPT

## 2024-04-11 RX ORDER — SODIUM CHLORIDE 9 MG/ML
20 INJECTION, SOLUTION INTRAVENOUS ONCE
Status: CANCELLED | OUTPATIENT
Start: 2024-04-19

## 2024-04-11 RX ORDER — SODIUM CHLORIDE 9 MG/ML
20 INJECTION, SOLUTION INTRAVENOUS ONCE
Status: COMPLETED | OUTPATIENT
Start: 2024-04-11 | End: 2024-04-11

## 2024-04-11 RX ADMIN — IRON SUCROSE 200 MG: 20 INJECTION, SOLUTION INTRAVENOUS at 10:16

## 2024-04-11 RX ADMIN — SODIUM CHLORIDE 20 ML/HR: 0.9 INJECTION, SOLUTION INTRAVENOUS at 10:10

## 2024-04-19 ENCOUNTER — HOSPITAL ENCOUNTER (OUTPATIENT)
Dept: INFUSION CENTER | Facility: CLINIC | Age: 74
Discharge: HOME | End: 2024-04-19
Payer: COMMERCIAL

## 2024-04-19 VITALS
HEART RATE: 62 BPM | DIASTOLIC BLOOD PRESSURE: 69 MMHG | RESPIRATION RATE: 18 BRPM | TEMPERATURE: 96.1 F | OXYGEN SATURATION: 100 % | SYSTOLIC BLOOD PRESSURE: 132 MMHG

## 2024-04-19 DIAGNOSIS — D50.0 IRON DEFICIENCY ANEMIA DUE TO CHRONIC BLOOD LOSS: Primary | ICD-10-CM

## 2024-04-19 PROCEDURE — 96365 THER/PROPH/DIAG IV INF INIT: CPT

## 2024-04-19 RX ORDER — SODIUM CHLORIDE 9 MG/ML
20 INJECTION, SOLUTION INTRAVENOUS ONCE
Status: CANCELLED | OUTPATIENT
Start: 2024-04-26

## 2024-04-19 RX ORDER — SODIUM CHLORIDE 9 MG/ML
20 INJECTION, SOLUTION INTRAVENOUS ONCE
Status: COMPLETED | OUTPATIENT
Start: 2024-04-19 | End: 2024-04-19

## 2024-04-19 RX ADMIN — SODIUM CHLORIDE 20 ML/HR: 0.9 INJECTION, SOLUTION INTRAVENOUS at 10:44

## 2024-04-19 RX ADMIN — IRON SUCROSE 200 MG: 20 INJECTION, SOLUTION INTRAVENOUS at 10:46

## 2024-04-22 ENCOUNTER — CONSULT (OUTPATIENT)
Dept: GASTROENTEROLOGY | Facility: CLINIC | Age: 74
End: 2024-04-22
Payer: COMMERCIAL

## 2024-04-22 VITALS
WEIGHT: 137 LBS | DIASTOLIC BLOOD PRESSURE: 74 MMHG | HEIGHT: 65 IN | SYSTOLIC BLOOD PRESSURE: 124 MMHG | TEMPERATURE: 97.4 F | BODY MASS INDEX: 22.82 KG/M2

## 2024-04-22 DIAGNOSIS — D64.9 ANEMIA, UNSPECIFIED TYPE: ICD-10-CM

## 2024-04-22 DIAGNOSIS — K92.1 HEMATOCHEZIA: Primary | ICD-10-CM

## 2024-04-22 PROCEDURE — 99204 OFFICE O/P NEW MOD 45 MIN: CPT | Performed by: PHYSICIAN ASSISTANT

## 2024-04-22 NOTE — PROGRESS NOTES
"[facility] - Outpatient Consultation  Ari Parsons 73 y.o. female MRN: [id number]  Encounter: [id number]          ASSESSMENT AND PLAN:      Air is a 74 y/o female with HTN,VWB who presents for consultation of rectal bleeding    1. Hematochezia  2. Chronic Anemia, unspecified type  3. Microscopic colitis   Pt notes that she has had bleeding externa hemorrhoids in the past and has undergone hemorrhoidectomy for them \"years ago.\" She says since then, her bleeding has become intermittent until recently, when it is not occurring several times/week. She says she gets BRB in the toilet and mixed with her stool. She does have hx of microscopic colitis noted on colon bx after colonoscopy in 2020, but she was never treated with budesonide for this as she says imodium works. She says she must take imodium at least 2 times/day otherwise she will have 2-5 episodes of diarrhea/day. Hgb has ranges form 8-10 since 2020 at least with most recent Hgb last month at 8.3. pt follows with hematology for her VWD and her anemia and is undergoing iron infusions soon as her serum iron and iron sat are low and ferritin is low-normal.   - EGD and colonoscopy with biopsies ordered to evaluate; explained that certainly the bleeding can be hemorrhoidal but would like to do a full work-up to ensure we are not missing any masses or lesions, AVMs, ulcers, etc. I also explained that if colon bx again note microscopic colitis, I would recommend trial of budesonide   -I obtained informed consent from the patient. The risks/benefits/alternatives of the procedure were discussed with the patient. Risks included, but not limited to, infection, bleeding, perforation, injury to organs in the abdomen, missed lesion and incomplete procedure were discussed. Patient was agreeable and electronic signature was obtained.  -continue imodium daily PRN diarrhea for now  -continue follow-up with hematology and IV venofer     " "    ______________________________________________________________________    HPI:  Ari is a 74 y/o female with HTN,VWB who presents for consultation of rectal bleeding. Pt notes that she has had bleeding externa hemorrhoids in the past and has undergone hemorrhoidectomy for them \"years ago.\" She says since then, her bleeding has become intermittent until recently, when it is not occurring several times/week. She says she gets BRB in the toilet and mixed with her stool. She does have hx of microscopic colitis noted on colon bx after colonoscopy in 2020, but she was never treated with budesonide for this as she says imodium works. She says she must take imodium at least 2 times/day otherwise she will have 2-5 episodes of diarrhea/day. She denies family hx of colon cancer, unintentional weight loss, fevers, chills, night sweats, n/v, heartburn, constipation, black BM. She says that once in a while, she gets cramping in her lower abdomen that is usually alleviated after a BM. She has prior abdominal surgical hx of hysterectomy. She denies NSAID use and is not on blood thinners.       REVIEW OF SYSTEMS:    CONSTITUTIONAL: Denies any fever, chills, rigors, and weight loss.  HEENT: No earache or tinnitus. Denies hearing loss or visual disturbances.  CARDIOVASCULAR: No chest pain or palpitations.   RESPIRATORY: Denies any cough, hemoptysis, shortness of breath or dyspnea on exertion.  GASTROINTESTINAL: As noted in the History of Present Illness.   GENITOURINARY: No problems with urination. Denies any hematuria or dysuria.  NEUROLOGIC: No dizziness or vertigo, denies headaches.   MUSCULOSKELETAL: Denies any muscle or joint pain.   SKIN: Denies skin rashes or itching.   ENDOCRINE: Denies excessive thirst. Denies intolerance to heat or cold.  PSYCHOSOCIAL: Denies depression or anxiety. Denies any recent memory loss.       Historical Information   Past Medical History:   Diagnosis Date    Anxiety     Asthma     Atrial " "fibrillation (HCC)     Bowel obstruction (HCC)     Hypertension     PAF (paroxysmal atrial fibrillation) (HCC)     Von Willebrand disease (HCC)      Past Surgical History:   Procedure Laterality Date    BUNIONECTOMY      HEMORROIDECTOMY      HYSTERECTOMY      POLYPECTOMY       Social History   Social History     Substance and Sexual Activity   Alcohol Use Yes    Alcohol/week: 21.0 standard drinks of alcohol    Types: 21 Glasses of wine per week     Social History     Substance and Sexual Activity   Drug Use No     Social History     Tobacco Use   Smoking Status Former    Current packs/day: 0.00    Types: Cigarettes, Pipe, Cigars    Quit date:     Years since quittin.3   Smokeless Tobacco Never   Tobacco Comments    1 pack/day for 5 years, and about 1/2 pack for 30 years     Family History   Problem Relation Age of Onset    Heart attack Father     Heart attack Sister        Meds/Allergies       Current Outpatient Medications:     albuterol (PROVENTIL HFA,VENTOLIN HFA) 90 mcg/act inhaler    b complex vitamins capsule    Cholecalciferol (VITAMIN D3) 1000 UNITS CAPS    estradiol (ESTRACE) 1 mg tablet    flecainide (TAMBOCOR) 50 mg tablet    FLUoxetine (PROzac) 40 MG capsule    loperamide (IMODIUM A-D) 2 MG tablet    LORazepam (ATIVAN) 0.5 mg tablet    losartan (COZAAR) 100 MG tablet    metoprolol succinate (TOPROL-XL) 50 mg 24 hr tablet    polyethylene glycol (GOLYTELY) 4000 mL solution    potassium chloride (K-DUR,KLOR-CON) 20 mEq tablet    Qvar RediHaler 80 MCG/ACT inhaler    Dextromethorphan-GG-APAP (Coricidin HBP Cold/Cough/Flu) -325 MG/15ML LIQD    ferrous sulfate 325 (65 Fe) mg tablet    Allergies   Allergen Reactions    Bactrim [Sulfamethoxazole-Trimethoprim]     Wellbutrin [Bupropion]            Objective     Blood pressure 124/74, temperature (!) 97.4 °F (36.3 °C), temperature source Tympanic, height 5' 5\" (1.651 m), weight 62.1 kg (137 lb). Body mass index is 22.8 kg/m².        PHYSICAL EXAM:  "     General Appearance:   Alert, cooperative, no distress   HEENT:   Normocephalic, atraumatic, anicteric.     Neck:  Supple, symmetrical, trachea midline   Lungs:   Clear to auscultation bilaterally; no rales, rhonchi or wheezing; respirations unlabored    Heart::   Regular rate and rhythm; no murmur, rub, or gallop.   Abdomen:   Soft, non-tender, non-distended; normal bowel sounds; no masses, no organomegaly    Genitalia:   Deferred    Rectal:   Deferred    Extremities:  No cyanosis, clubbing or edema    Pulses:  2+ and symmetric    Skin:  No jaundice, rashes, or lesions    Lymph nodes:  No palpable cervical lymphadenopathy        Lab Results:   No visits with results within 1 Day(s) from this visit.   Latest known visit with results is:   Appointment on 03/05/2024   Component Date Value    WBC 03/05/2024 5.87     RBC 03/05/2024 2.59 (L)     Hemoglobin 03/05/2024 8.3 (L)     Hematocrit 03/05/2024 26.7 (L)     MCV 03/05/2024 103 (H)     MCH 03/05/2024 32.0     MCHC 03/05/2024 31.1 (L)     RDW 03/05/2024 15.9 (H)     MPV 03/05/2024 9.5     Platelets 03/05/2024 398 (H)     nRBC 03/05/2024 0     Segmented % 03/05/2024 67     Immature Grans % 03/05/2024 0     Lymphocytes % 03/05/2024 17     Monocytes % 03/05/2024 12     Eosinophils Relative 03/05/2024 3     Basophils Relative 03/05/2024 1     Absolute Neutrophils 03/05/2024 3.94     Absolute Immature Grans 03/05/2024 0.02     Absolute Lymphocytes 03/05/2024 1.01     Absolute Monocytes 03/05/2024 0.68     Eosinophils Absolute 03/05/2024 0.16     Basophils Absolute 03/05/2024 0.06          Radiology Results:   No results found.   Answers submitted by the patient for this visit:  Abdominal Pain Questionnaire (Submitted on 4/16/2024)  Chief Complaint: Abdominal pain  Frequency: intermittently  diarrhea: Yes  hematochezia: Yes

## 2024-04-22 NOTE — PATIENT INSTRUCTIONS
Scheduled date of EGD/colonoscopy (as of today):05/09/2024  Physician performing EGD/colonoscopy:KRYSTIAN  Location of EGD/colonoscopy:[facility]  Desired bowel prep reviewed with patient: Cliffordytely /Dulcolax  Instructions reviewed with patient by:NIYA  Clearances:   None    Patient does get infusions and will have  an appointment one week prior.  Patient has a follow up appointment  09/26/2024 at 1:40 with Dr. Goodwin

## 2024-04-26 ENCOUNTER — HOSPITAL ENCOUNTER (OUTPATIENT)
Dept: INFUSION CENTER | Facility: CLINIC | Age: 74
End: 2024-04-26
Payer: COMMERCIAL

## 2024-04-26 VITALS
OXYGEN SATURATION: 99 % | HEART RATE: 59 BPM | RESPIRATION RATE: 18 BRPM | TEMPERATURE: 97.2 F | DIASTOLIC BLOOD PRESSURE: 78 MMHG | SYSTOLIC BLOOD PRESSURE: 119 MMHG

## 2024-04-26 DIAGNOSIS — D50.0 IRON DEFICIENCY ANEMIA DUE TO CHRONIC BLOOD LOSS: Primary | ICD-10-CM

## 2024-04-26 PROCEDURE — 96365 THER/PROPH/DIAG IV INF INIT: CPT

## 2024-04-26 RX ORDER — SODIUM CHLORIDE 9 MG/ML
20 INJECTION, SOLUTION INTRAVENOUS ONCE
Status: COMPLETED | OUTPATIENT
Start: 2024-04-26 | End: 2024-04-26

## 2024-04-26 RX ORDER — SODIUM CHLORIDE 9 MG/ML
20 INJECTION, SOLUTION INTRAVENOUS ONCE
Status: CANCELLED | OUTPATIENT
Start: 2024-05-03

## 2024-04-26 RX ADMIN — SODIUM CHLORIDE 20 ML/HR: 0.9 INJECTION, SOLUTION INTRAVENOUS at 10:50

## 2024-04-26 RX ADMIN — IRON SUCROSE 200 MG: 20 INJECTION, SOLUTION INTRAVENOUS at 10:46

## 2024-04-26 NOTE — PROGRESS NOTES
Received for venofer. No complaints offered. Tolerated infusion without difficulty. Pt to return 5/3 at 1pm. AVS declined.

## 2024-05-01 ENCOUNTER — TELEPHONE (OUTPATIENT)
Dept: HEMATOLOGY ONCOLOGY | Facility: CLINIC | Age: 74
End: 2024-05-01

## 2024-05-01 DIAGNOSIS — D68.00 VON WILLEBRAND DISEASE (HCC): Primary | ICD-10-CM

## 2024-05-01 DIAGNOSIS — D50.0 IRON DEFICIENCY ANEMIA DUE TO CHRONIC BLOOD LOSS: ICD-10-CM

## 2024-05-01 NOTE — TELEPHONE ENCOUNTER
"Received attached VM via teams:  \"Hi, my name is Ari Parsons and I'm a patient of doctor ann. My birthday is 1950 and I have a question for Doctor Castañeda or I have a request for Doctor Luis. I should say I'm having a colonoscopy on May 9th and an EGD also on May 9th at [facility]. Meredith Gonzalez from [facility] is the person who ordered it not be having the operation or of the procedure with Doctor Cool. What I need from Doctor Castañeda is when I have a colonoscopy. In the past he has ordered Humate P and then adding Benadryl and Decadron to be administered before the colonoscopy. So what I'd like is for you to speak with him and set that up with the the Colonoscopy department of [facility]. My number is [phone], my name is Ari Parsons and my birthday is [birth year]. Please give me a call back and let me know whether you got this message and any more questions you need to ask. Thank you.\"    "

## 2024-05-01 NOTE — TELEPHONE ENCOUNTER
"Patient has type II von willebrand disease. Per Dr. Marcelo attached are his recommendations for minor surgery/procedures   \"Usually she receive Humate-P 1500 international unit 30-60 minutes prior to colonoscopy or minor procedures and she will stay in the hospital as inpatient for Humate P 900 units every 12 hr x2 on 3 with premedication with Solu-Medrol 40 mg IV prior to her 1st dose of Humate P and Benadryl p.r.n. \"    Spoke with patient, made her aware Dr. Marcelo does not recommend outpatient EGD and Colonoscopy. Will forward to GI and patient will follow up with their department.   "

## 2024-05-03 ENCOUNTER — HOSPITAL ENCOUNTER (OUTPATIENT)
Dept: INFUSION CENTER | Facility: CLINIC | Age: 74
Discharge: HOME | End: 2024-05-03
Payer: COMMERCIAL

## 2024-05-03 ENCOUNTER — TELEPHONE (OUTPATIENT)
Dept: HEMATOLOGY ONCOLOGY | Facility: CLINIC | Age: 74
End: 2024-05-03

## 2024-05-03 VITALS
OXYGEN SATURATION: 99 % | HEART RATE: 54 BPM | RESPIRATION RATE: 18 BRPM | SYSTOLIC BLOOD PRESSURE: 120 MMHG | DIASTOLIC BLOOD PRESSURE: 67 MMHG | TEMPERATURE: 96.5 F

## 2024-05-03 DIAGNOSIS — D50.0 IRON DEFICIENCY ANEMIA DUE TO CHRONIC BLOOD LOSS: Primary | ICD-10-CM

## 2024-05-03 PROCEDURE — 96365 THER/PROPH/DIAG IV INF INIT: CPT

## 2024-05-03 RX ORDER — SODIUM CHLORIDE 9 MG/ML
20 INJECTION, SOLUTION INTRAVENOUS ONCE
Status: CANCELLED | OUTPATIENT
Start: 2024-05-03

## 2024-05-03 RX ORDER — SODIUM CHLORIDE 9 MG/ML
20 INJECTION, SOLUTION INTRAVENOUS ONCE
Status: COMPLETED | OUTPATIENT
Start: 2024-05-03 | End: 2024-05-03

## 2024-05-03 RX ADMIN — IRON SUCROSE 200 MG: 20 INJECTION, SOLUTION INTRAVENOUS at 13:33

## 2024-05-03 RX ADMIN — SODIUM CHLORIDE 20 ML/HR: 0.9 INJECTION, SOLUTION INTRAVENOUS at 13:30

## 2024-05-03 NOTE — PROGRESS NOTES
Patient tolerated treatment today without complications. Patient has completed all ordered doses of venofer. Patient is aware of follow up appointment with Dr. Marcelo in September and knows to have blood work drawn beforehand. Print out declined.

## 2024-05-03 NOTE — TELEPHONE ENCOUNTER
Patient came in to [facility] asking about her upcoming procedure for a colonoscopy. Relayed to her that she needs to follow up with GI.

## 2024-05-06 NOTE — TELEPHONE ENCOUNTER
Pt called again asking if all the arrangements for her colon/EGD are in place as she does not want to cancel her procedures. As of yet, she has not heard from anyone. Please reach out to pt

## 2024-05-14 ENCOUNTER — DOCUMENTATION (OUTPATIENT)
Dept: HEMATOLOGY ONCOLOGY | Facility: CLINIC | Age: 74
End: 2024-05-14

## 2024-05-14 ENCOUNTER — TELEPHONE (OUTPATIENT)
Age: 74
End: 2024-05-14

## 2024-05-14 NOTE — PROGRESS NOTES
Per Dr. Marcelo he will contact Meredith Yousif PA-C in regards to Humate-P orders needed for coloscopy tomorrow.

## 2024-05-14 NOTE — TELEPHONE ENCOUNTER
"Dr. Marcelo, pt's hematologist, left me a message noting that the pt will need \"Humate-P 1500 international unit 30-60 minutes prior to colonoscopy and Humate P 900 units every 12 hr x2 on 3 with premedication with Solu-Medrol 40 mg IV prior to her 1st dose of Humate P and Benadryl p.r.n. \" I explained that I will send a message to the endoscopist who is scoping her as well as the [facility] doctor who is accepting her.     Dr. Larios and Dr. Jimenes: just wanted to make you both aware. If you have any specific questions on dosing, please reach out to Dr. Marcelo. He says that this order should be placed prior to her coming in.     Thanks, all!  "

## 2024-05-14 NOTE — TELEPHONE ENCOUNTER
Pt calling with questions about being admitted before her procedure. She thought she only had to stay after the procedure incase there was any bleeding. I called the office and Chanda advised me to tell the pt one of her Dr's wanted her to be admitted before the procedure so they could pre med her. She also told me to tell the pt the hospital will have her prep.

## 2024-05-15 ENCOUNTER — HOSPITAL ENCOUNTER (INPATIENT)
Facility: HOSPITAL | Age: 74
LOS: 2 days | Discharge: HOME | DRG: 378 | End: 2024-05-17
Attending: INTERNAL MEDICINE | Admitting: STUDENT IN AN ORGANIZED HEALTH CARE EDUCATION/TRAINING PROGRAM
Payer: COMMERCIAL

## 2024-05-15 DIAGNOSIS — K92.1 HEMATOCHEZIA: Primary | ICD-10-CM

## 2024-05-15 LAB
BASOPHILS # BLD AUTO: 0.04 THOUSANDS/ÂΜL (ref 0–0.1)
BASOPHILS NFR BLD AUTO: 1 % (ref 0–1)
EOSINOPHIL # BLD AUTO: 0.09 THOUSAND/ÂΜL (ref 0–0.61)
EOSINOPHIL NFR BLD AUTO: 1 % (ref 0–6)
ERYTHROCYTE [DISTWIDTH] IN BLOOD BY AUTOMATED COUNT: 17.4 % (ref 11.6–15.1)
HCT VFR BLD AUTO: 29.9 % (ref 34.8–46.1)
HGB BLD-MCNC: 9.2 G/DL (ref 11.5–15.4)
IMM GRANULOCYTES # BLD AUTO: 0.04 THOUSAND/UL (ref 0–0.2)
IMM GRANULOCYTES NFR BLD AUTO: 1 % (ref 0–2)
LYMPHOCYTES # BLD AUTO: 0.82 THOUSANDS/ÂΜL (ref 0.6–4.47)
LYMPHOCYTES NFR BLD AUTO: 11 % (ref 14–44)
MCH RBC QN AUTO: 31.6 PG (ref 26.8–34.3)
MCHC RBC AUTO-ENTMCNC: 30.8 G/DL (ref 31.4–37.4)
MCV RBC AUTO: 103 FL (ref 82–98)
MONOCYTES # BLD AUTO: 0.66 THOUSAND/ÂΜL (ref 0.17–1.22)
MONOCYTES NFR BLD AUTO: 9 % (ref 4–12)
NEUTROPHILS # BLD AUTO: 5.58 THOUSANDS/ÂΜL (ref 1.85–7.62)
NEUTS SEG NFR BLD AUTO: 77 % (ref 43–75)
NRBC BLD AUTO-RTO: 0 /100 WBCS
PLATELET # BLD AUTO: 277 THOUSANDS/UL (ref 149–390)
PMV BLD AUTO: 10.8 FL (ref 8.9–12.7)
RBC # BLD AUTO: 2.91 MILLION/UL (ref 3.81–5.12)
WBC # BLD AUTO: 7.23 THOUSAND/UL (ref 4.31–10.16)

## 2024-05-15 PROCEDURE — 85025 COMPLETE CBC W/AUTO DIFF WBC: CPT | Performed by: STUDENT IN AN ORGANIZED HEALTH CARE EDUCATION/TRAINING PROGRAM

## 2024-05-15 PROCEDURE — 99223 1ST HOSP IP/OBS HIGH 75: CPT | Performed by: INTERNAL MEDICINE

## 2024-05-15 PROCEDURE — 99222 1ST HOSP IP/OBS MODERATE 55: CPT | Performed by: STUDENT IN AN ORGANIZED HEALTH CARE EDUCATION/TRAINING PROGRAM

## 2024-05-15 RX ORDER — LOSARTAN POTASSIUM 50 MG/1
100 TABLET ORAL DAILY
Status: DISCONTINUED | OUTPATIENT
Start: 2024-05-16 | End: 2024-05-17 | Stop reason: HOSPADM

## 2024-05-15 RX ORDER — METOPROLOL SUCCINATE 50 MG/1
50 TABLET, EXTENDED RELEASE ORAL DAILY
Status: DISCONTINUED | OUTPATIENT
Start: 2024-05-16 | End: 2024-05-17 | Stop reason: HOSPADM

## 2024-05-15 RX ORDER — FLUOXETINE HYDROCHLORIDE 20 MG/1
40 CAPSULE ORAL DAILY
Status: DISCONTINUED | OUTPATIENT
Start: 2024-05-16 | End: 2024-05-17 | Stop reason: HOSPADM

## 2024-05-15 RX ORDER — ALBUTEROL SULFATE 90 UG/1
2 AEROSOL, METERED RESPIRATORY (INHALATION) EVERY 6 HOURS PRN
Status: DISCONTINUED | OUTPATIENT
Start: 2024-05-15 | End: 2024-05-17 | Stop reason: HOSPADM

## 2024-05-15 RX ORDER — FLECAINIDE ACETATE 50 MG/1
75 TABLET ORAL 2 TIMES DAILY
Status: DISCONTINUED | OUTPATIENT
Start: 2024-05-15 | End: 2024-05-17 | Stop reason: HOSPADM

## 2024-05-15 RX ORDER — LORAZEPAM 0.5 MG/1
0.5 TABLET ORAL EVERY 6 HOURS PRN
Status: DISCONTINUED | OUTPATIENT
Start: 2024-05-15 | End: 2024-05-17 | Stop reason: HOSPADM

## 2024-05-15 RX ORDER — METHYLPREDNISOLONE SODIUM SUCCINATE 40 MG/ML
40 INJECTION, POWDER, LYOPHILIZED, FOR SOLUTION INTRAMUSCULAR; INTRAVENOUS ONCE
Status: COMPLETED | OUTPATIENT
Start: 2024-05-16 | End: 2024-05-16

## 2024-05-15 RX ORDER — ACETAMINOPHEN 325 MG/1
650 TABLET ORAL EVERY 6 HOURS PRN
Status: DISCONTINUED | OUTPATIENT
Start: 2024-05-15 | End: 2024-05-17 | Stop reason: HOSPADM

## 2024-05-15 RX ORDER — ONDANSETRON 2 MG/ML
4 INJECTION INTRAMUSCULAR; INTRAVENOUS EVERY 6 HOURS PRN
Status: DISCONTINUED | OUTPATIENT
Start: 2024-05-15 | End: 2024-05-17 | Stop reason: HOSPADM

## 2024-05-15 RX ADMIN — POLYETHYLENE GLYCOL 3350, SODIUM SULFATE ANHYDROUS, SODIUM BICARBONATE, SODIUM CHLORIDE, POTASSIUM CHLORIDE 4000 ML: 236; 22.74; 6.74; 5.86; 2.97 POWDER, FOR SOLUTION ORAL at 16:34

## 2024-05-15 RX ADMIN — FLECAINIDE ACETATE 75 MG: 50 TABLET ORAL at 18:44

## 2024-05-15 NOTE — CONSULTS
Consultation -  [facility]  Patient: Ari Parsons 73 y.o. female   MRN: [id number]  PCP: Donovan Tolentino MD  Unit/Bed#: [facility] 815-01 Encounter: [id number]  Length of Stay: 0 days        Inpatient consult to gastroenterology     Date/Time  5/15/2024 11:28 AM     Performed by  Benedict Scruggs MD   Authorized by  Leda Rosa MD           Assessment/Plan:  Ari Parsons is a 73 y.o. female with a PMH of HTN, asthma, JAVIER, Afib, vWD, chronic diarrhea, IBS-D, microscopic colitis who presents for bowel prep for EGD/colon for hematochezia, chronic anemia and Hx microscopic colitis.    # hematochezia  # chronic anemia  # microscopic colitis  # vWD   - intermittent BRBPR w/ sometimes blood mixed with stool. Never trialed budesonide for her microscopic colitis since she takes antispasmodics for her symptoms.   - regarding regarding her vWD, she gets vWF infusions regularly that are arranged through hematology. She will be getting Humate-P 1500 IU before her procedures and then 900 IU q12h w/ premedication w/ solumedrol 40 prior to the first dose.   - EGD/colon scheduled for tomorrow   - CLD today, bowel prep this afternoon, NPO AMN    History of Present Illness:  Ari Parsons is a 73 y.o. female with a PMH of HTN, asthma, JAVIER, Afib, vWD, chronic diarrhea, IBS-D, microscopic colitis who presents for bowel prep for EGD/colon for hematochezia, chronic anemia and Hx microscopic colitis.    Pt is unsure as to why she was admitted for inpatient prep. She has been otherwise doing well. Chronic diarrhea has been going on for many years now. Last colon 2020, Bx showed microscopic colitis but she is currently only taking imodium. Has significant watery diarrhea and occasionally has bright red blood. She said since her hemorrhoidectomy many years ago, the hemorrhoids have since come back and they are worse than ever.    No other complaints currently.    Past Medical History:   Diagnosis Date    Anxiety      Asthma     Atrial fibrillation (HCC)     Bowel obstruction (HCC)     Hypertension     PAF (paroxysmal atrial fibrillation) (HCC)     Von Willebrand disease (HCC)      Past Surgical History:   Procedure Laterality Date    BUNIONECTOMY      HEMORROIDECTOMY      HYSTERECTOMY      POLYPECTOMY       Prior to Admission medications    Medication Sig Start Date End Date Taking? Authorizing Provider   albuterol (PROVENTIL HFA,VENTOLIN HFA) 90 mcg/act inhaler Inhale 2 puffs every 6 (six) hours as needed for wheezing    Historical Provider, MD   b complex vitamins capsule Take 1 capsule by mouth daily    Historical Provider, MD   Cholecalciferol (VITAMIN D3) 1000 UNITS CAPS Take by mouth daily    Historical Provider, MD   Dextromethorphan-GG-APAP (Coricidin HBP Cold/Cough/Flu) -325 MG/15ML LIQD Take 30 mL by mouth 3 (three) times a day as needed (cough congestion) 3/26/24   SUSAN Ramirez   estradiol (ESTRACE) 1 mg tablet Take 1 mg by mouth 3 (three) times a week     Historical Provider, MD   ferrous sulfate 325 (65 Fe) mg tablet Take 325 mg by mouth as needed  Patient not taking: Reported on 3/26/2024    Historical Provider, MD   flecainide (TAMBOCOR) 50 mg tablet Take 1.5 tablets (75 mg total) by mouth 2 (two) times a day 7/18/23   Luciana Walker MD   FLUoxetine (PROzac) 40 MG capsule Take 40 mg by mouth daily    Historical Provider, MD   loperamide (IMODIUM A-D) 2 MG tablet Take 2 mg by mouth as needed for diarrhea    Historical Provider, MD   LORazepam (ATIVAN) 0.5 mg tablet Take 0.5 mg by mouth every 6 (six) hours as needed for anxiety    Historical Provider, MD   losartan (COZAAR) 100 MG tablet Take 1 tablet (100 mg total) by mouth daily 7/18/23   Luciana Walker MD   metoprolol succinate (TOPROL-XL) 50 mg 24 hr tablet Take 1 tablet (50 mg total) by mouth daily 7/18/23   Luciana Walker MD   polyethylene glycol (GOLYTELY) 4000 mL solution Take 4,000 mL by mouth once for 1 dose 4/22/24 4/22/24   Meredith Yousif PA-C   potassium chloride (K-DUR,KLOR-CON) 20 mEq tablet Take 2 tablets (40 mEq total) by mouth daily 23   Luciana Walker MD   Qvar RediHaler 80 MCG/ACT inhaler 2 puffs 2 (two) times a day 10/12/21   Historical Provider, MD     Allergies   Allergen Reactions    Bactrim [Sulfamethoxazole-Trimethoprim]     Wellbutrin [Bupropion]      Social History     Tobacco Use    Smoking status: Former     Current packs/day: 0.00     Types: Cigarettes, Pipe, Cigars     Quit date:      Years since quittin.3    Smokeless tobacco: Never    Tobacco comments:     1 pack/day for 5 years, and about 1/2 pack for 30 years   Vaping Use    Vaping status: Never Used   Substance Use Topics    Alcohol use: Yes     Alcohol/week: 21.0 standard drinks of alcohol     Types: 21 Glasses of wine per week    Drug use: No     Family History   Problem Relation Age of Onset    Heart attack Father     Heart attack Sister        Objective:  BP (!) 104/49   Pulse 60   Temp 97.5 °F (36.4 °C)   Resp 18   SpO2 99%     Intake/Output Summary (Last 24 hours) at 5/15/2024 1307  Last data filed at 5/15/2024 1249  Gross per 24 hour   Intake 0 ml   Output --   Net 0 ml     There is no height or weight on file to calculate BMI.  Physical Exam  Constitutional:       General: She is not in acute distress.     Appearance: Normal appearance.   HENT:      Head: Normocephalic and atraumatic.      Nose: Nose normal.      Mouth/Throat:      Mouth: Mucous membranes are moist.   Eyes:      General: No scleral icterus.     Extraocular Movements: Extraocular movements intact.      Conjunctiva/sclera: Conjunctivae normal.      Pupils: Pupils are equal, round, and reactive to light.   Cardiovascular:      Rate and Rhythm: Normal rate and regular rhythm.   Pulmonary:      Effort: Pulmonary effort is normal.   Abdominal:      General: Abdomen is flat. There is no distension.      Palpations: There is no mass.      Tenderness: There is no  abdominal tenderness.   Musculoskeletal:         General: No swelling. Normal range of motion.   Skin:     General: Skin is warm and dry.      Capillary Refill: Capillary refill takes less than 2 seconds.   Neurological:      General: No focal deficit present.      Mental Status: She is alert.   Psychiatric:         Mood and Affect: Mood normal.         Labs:  I have reviewed all available labs and imaging results in Epic.      Results from last 7 days   Lab Units 05/15/24  1231   WBC Thousand/uL 7.23   HEMOGLOBIN g/dL 9.2*   HEMATOCRIT % 29.9*   PLATELETS Thousands/uL 277   SEGS PCT % 77*   LYMPHO PCT % 11*   MONO PCT % 9   EOS PCT % 1   BASOS PCT % 1   TOTAL NEUT ABS Thousands/µL 5.58   LYMPHS ABS Thousands/µL 0.82   MONOS ABS Thousand/µL 0.66   EOS ABS Thousand/µL 0.09   BASOS ABS Thousands/µL 0.04               Additional Labs:                 Imaging:  No orders to display       Medications:   Current Facility-Administered Medications   Medication Dose Route Frequency Provider Last Rate    acetaminophen  650 mg Oral Q6H PRN Leda Rosa MD      ondansetron  4 mg Intravenous Q6H PRN Leda Rosa MD         Problem List:  Patient Active Problem List   Diagnosis    Paroxysmal atrial fibrillation (HCC)    Asthma    Bronchitis    Von Willebrand disease (HCC)    Hiatal hernia    Typical atrial flutter (HCC)    Acute gastritis without hemorrhage    Irritable bowel disease    Essential hypertension    Anxiety disorder    Diarrhea    Anemia    Alcohol use    Stage 3 chronic kidney disease, unspecified whether stage 3a or 3b CKD (HCC)    Hematochezia       Case discussed with attending physician Dr. Larios. All recs are preliminary pending final attestation.    Benedict Scruggs, PGY-4

## 2024-05-15 NOTE — ASSESSMENT & PLAN NOTE
Continue metoprolol succinate 50 mg daily and flecainide 75 mg twice daily  Currently normal sinus rhythm  Not on anticoagulation in the setting of hematochezia

## 2024-05-15 NOTE — H&P
[facility]  H&P  Name: Ari Parsons 73 y.o. female I MRN: [id number]  Unit/Bed#: [facility] 815-01 I Date of Admission: 5/15/2024   Date of Service: 5/15/2024 I Hospital Day: 0  Addendum : discussed with pharmacist Soha Romero, who reports while inpatient we can either do 927 units or 1854 units of Humate-P as that is what we have in stock.  I told her doctor Davian recommendations and she recommended one-time dose of the 1854 unit before colonoscopy followed by 927 units twice daily. Orders placed    Assessment & Plan   Hematochezia  Assessment & Plan  Patient noting longstanding history of intermittent hematochezia initially suspected from external hemorrhoids, now with increased frequency of the hematochezia now following with GI.  Reportedly with prior history of microscopic colitis noted on colon biopsy after colonoscopy 2020 however has never received full treatment for this  EGD/colonoscopy tentatively planned for 5/16/2024 with GI with biopsies, however given history of von Willebrand disease and need for Humate-P direct admission arranged for bowel prep and pretreatment  Per Dr. Marcelo (patient's hematologist)-recommend Humate-P 1500 international unit 30-60 minutes prior to colonoscopy and Humate P 900 units every 12 hr x2-3 with premedication with Solu-Medrol 40 mg IV prior to her 1st dose of Humate P and Benadryl p.r.n.  GI consulted  Start bowel preparation, clear liquid diet otherwise.  N.p.o. after midnight  Monitor intermittent hemoglobins, transfuse for hemoglobin less than 7 or markedly symptomatic    Stage 3 chronic kidney disease, unspecified whether stage 3a or 3b CKD (HCC)  Assessment & Plan  Lab Results   Component Value Date    EGFR 56 02/14/2024    EGFR 57 04/17/2023    EGFR 63 12/08/2021    CREATININE 1.00 02/14/2024    CREATININE 0.99 04/17/2023    CREATININE 0.93 12/08/2021   Baseline creatinine appears 0.9-1  Avoid/limit nephrotoxins and  hypotension    Anemia  Assessment & Plan  Follows with [facility] hematology, receives IV Venofer as outpatient.    Appears baseline hemoglobin 8-10  Hemoglobin 9.2 today, will repeat in the morning    Anxiety disorder  Assessment & Plan  Mood stable, continue home dose fluoxetine 40 mg daily and oral lorazepam 0.5 mg every 6 hours as needed    Asthma  Assessment & Plan  Not in acute exacerbation  On Qvar twice daily at home, since nonformulary will place on fluticasone daily  Continue as needed albuterol    Paroxysmal atrial fibrillation (HCC)  Assessment & Plan  Continue metoprolol succinate 50 mg daily and flecainide 75 mg twice daily  Currently normal sinus rhythm  Not on anticoagulation in the setting of hematochezia    * Von Willebrand disease (HCC)  Assessment & Plan  Known history of von Willebrand disease, will obtain CBC along with PT/INR and APTT  Patient follows with [facility] hematology Dr. Marcelo.  Per outpatient gastroenterologist discussion with him he recommends Humate-P 1500 international unit 30-60 minutes prior to colonoscopy and Humate-P 900 units every 12 hours x2-3 with premedication of Solumedrol 40mg IV prior to first dose of Humate-P, additionally with Benadryl PRN.    Orders placed for this           VTE Pharmacologic Prophylaxis: VTE Score: 3 Moderate Risk (Score 3-4) - Pharmacological DVT Prophylaxis Contraindicated. Sequential Compression Devices Ordered.  Code Status: Level 1 - Full Code   Discussion with family: Updated  () at bedside.    Anticipated Length of Stay: Patient will be admitted on an inpatient basis with an anticipated length of stay of greater than 2 midnights secondary to GI intervention.    Total Time Spent on Date of Encounter in care of patient: 35 mins. This time was spent on one or more of the following: performing physical exam; counseling and coordination of care; obtaining or reviewing history; documenting in the medical record;  reviewing/ordering tests, medications or procedures; communicating with other healthcare professionals and discussing with patient's family/caregivers.    Chief Complaint: Hematochezia, bowel prep    History of Present Illness:  Ari Parsons is a 73 y.o. female with a PMH of hypertension, asthma, A-fib, von Willebrand's disease, chronic diarrhea in the setting of IBS, microscopic colitis and JAVIER who presents for bowel prep for EGD/colon in the setting of hematochezia, chronic anemia and history of microscopic colitis.  Patient reports feeling well.  She continues to have episodes of watery diarrhea with occasional bright red blood.  Chronic diarrhea has been ongoing for years and is currently stable.      Review of Systems:  Review of Systems   Constitutional:  Negative for fatigue and fever.   Respiratory:  Negative for shortness of breath.    Cardiovascular:  Negative for chest pain and palpitations.   Gastrointestinal:  Positive for diarrhea. Negative for abdominal pain, nausea and vomiting.   Neurological:  Negative for weakness and light-headedness.   All other systems reviewed and are negative.      Past Medical and Surgical History:   Past Medical History:   Diagnosis Date    Anxiety     Asthma     Atrial fibrillation (HCC)     Bowel obstruction (HCC)     Hypertension     PAF (paroxysmal atrial fibrillation) (HCC)     Von Willebrand disease (HCC)        Past Surgical History:   Procedure Laterality Date    BUNIONECTOMY      HEMORROIDECTOMY      HYSTERECTOMY      POLYPECTOMY         Meds/Allergies:  Prior to Admission medications    Medication Sig Start Date End Date Taking? Authorizing Provider   albuterol (PROVENTIL HFA,VENTOLIN HFA) 90 mcg/act inhaler Inhale 2 puffs every 6 (six) hours as needed for wheezing    Historical Provider, MD   b complex vitamins capsule Take 1 capsule by mouth daily    Historical Provider, MD   Cholecalciferol (VITAMIN D3) 1000 UNITS CAPS Take by mouth daily    Historical  Provider, MD   Dextromethorphan-GG-APAP (Coricidin HBP Cold/Cough/Flu) -325 MG/15ML LIQD Take 30 mL by mouth 3 (three) times a day as needed (cough congestion) 3/26/24   SUSAN Ramirez   ferrous sulfate 325 (65 Fe) mg tablet Take 325 mg by mouth as needed  Patient not taking: Reported on 3/26/2024    Historical Provider, MD   flecainide (TAMBOCOR) 50 mg tablet Take 1.5 tablets (75 mg total) by mouth 2 (two) times a day 7/18/23   Luciana Walker MD   FLUoxetine (PROzac) 40 MG capsule Take 40 mg by mouth daily    Historical Provider, MD   loperamide (IMODIUM A-D) 2 MG tablet Take 2 mg by mouth as needed for diarrhea    Historical Provider, MD   LORazepam (ATIVAN) 0.5 mg tablet Take 0.5 mg by mouth every 6 (six) hours as needed for anxiety    Historical Provider, MD   losartan (COZAAR) 100 MG tablet Take 1 tablet (100 mg total) by mouth daily 7/18/23   Luciana Walker MD   metoprolol succinate (TOPROL-XL) 50 mg 24 hr tablet Take 1 tablet (50 mg total) by mouth daily 7/18/23   Luciana Walker MD   polyethylene glycol (GOLYTELY) 4000 mL solution Take 4,000 mL by mouth once for 1 dose 4/22/24 4/22/24  Meredith Yousif PA-C   Qvar RediHaler 80 MCG/ACT inhaler 2 puffs 2 (two) times a day 10/12/21   Historical Provider, MD   estradiol (ESTRACE) 1 mg tablet Take 1 mg by mouth 3 (three) times a week   5/15/24  Historical Provider, MD   potassium chloride (K-DUR,KLOR-CON) 20 mEq tablet Take 2 tablets (40 mEq total) by mouth daily 7/18/23 5/15/24  Luciana Walker MD     I have reviewed home medications with patient personally.    Allergies:   Allergies   Allergen Reactions    Bactrim [Sulfamethoxazole-Trimethoprim]     Wellbutrin [Bupropion]        Social History:  Marital Status: /Civil Union   Occupation: Unknown  Patient Pre-hospital Living Situation: Home  Patient Pre-hospital Level of Mobility: walks  Patient Pre-hospital Diet Restrictions: None  Substance Use History:   Social History      Substance and Sexual Activity   Alcohol Use Yes    Alcohol/week: 21.0 standard drinks of alcohol    Types: 21 Glasses of wine per week     Social History     Tobacco Use   Smoking Status Former    Current packs/day: 0.00    Types: Cigarettes, Pipe, Cigars    Quit date:     Years since quittin.3   Smokeless Tobacco Never   Tobacco Comments    1 pack/day for 5 years, and about 1/2 pack for 30 years     Social History     Substance and Sexual Activity   Drug Use No       Family History:  Family History   Problem Relation Age of Onset    Heart attack Father     Heart attack Sister        Physical Exam:     Vitals:   Blood Pressure: (!) 104/49 (05/15/24 1200)  Pulse: 60 (05/15/24 1200)  Temperature: 97.5 °F (36.4 °C) (05/15/24 1200)  Respirations: 18 (05/15/24 1200)  SpO2: 99 % (05/15/24 1200)    Physical Exam  Vitals and nursing note reviewed.   Constitutional:       General: She is not in acute distress.     Appearance: She is well-developed.   HENT:      Head: Normocephalic and atraumatic.   Eyes:      Conjunctiva/sclera: Conjunctivae normal.   Cardiovascular:      Rate and Rhythm: Normal rate and regular rhythm.   Pulmonary:      Effort: Pulmonary effort is normal. No respiratory distress.   Abdominal:      General: There is no distension.   Musculoskeletal:         General: No swelling.      Cervical back: Neck supple.   Skin:     General: Skin is warm and dry.   Neurological:      Mental Status: She is alert.   Psychiatric:         Mood and Affect: Mood normal.         Behavior: Behavior normal.          Additional Data:     Lab Results:  Results from last 7 days   Lab Units 05/15/24  1231   WBC Thousand/uL 7.23   HEMOGLOBIN g/dL 9.2*   HEMATOCRIT % 29.9*   PLATELETS Thousands/uL 277   SEGS PCT % 77*   LYMPHO PCT % 11*   MONO PCT % 9   EOS PCT % 1                           Lines/Drains:  Invasive Devices       None                       Imaging: No pertinent imaging reviewed.  No orders to display        EKG and Other Studies Reviewed on Admission:   EKG: No EKG obtained.    ** Please Note: This note has been constructed using a voice recognition system. **

## 2024-05-15 NOTE — PLAN OF CARE
Problem: PAIN - ADULT  Goal: Verbalizes/displays adequate comfort level or baseline comfort level  Description: Interventions:  - Encourage patient to monitor pain and request assistance  - Assess pain using appropriate pain scale  - Administer analgesics based on type and severity of pain and evaluate response  - Implement non-pharmacological measures as appropriate and evaluate response  - Consider cultural and social influences on pain and pain management  - Notify physician/advanced practitioner if interventions unsuccessful or patient reports new pain  Outcome: Progressing     Problem: INFECTION - ADULT  Goal: Absence or prevention of progression during hospitalization  Description: INTERVENTIONS:  - Assess and monitor for signs and symptoms of infection  - Monitor lab/diagnostic results  - Monitor all insertion sites, i.e. indwelling lines, tubes, and drains  - Monitor endotracheal if appropriate and nasal secretions for changes in amount and color  - Bonner appropriate cooling/warming therapies per order  - Administer medications as ordered  - Instruct and encourage patient and family to use good hand hygiene technique  - Identify and instruct in appropriate isolation precautions for identified infection/condition  Outcome: Progressing  Goal: Absence of fever/infection during neutropenic period  Description: INTERVENTIONS:  - Monitor WBC    Outcome: Progressing     Problem: SAFETY ADULT  Goal: Patient will remain free of falls  Description: INTERVENTIONS:  - Educate patient/family on patient safety including physical limitations  - Instruct patient to call for assistance with activity   - Consult OT/PT to assist with strengthening/mobility   - Keep Call bell within reach  - Keep bed low and locked with side rails adjusted as appropriate  - Keep care items and personal belongings within reach  - Initiate and maintain comfort rounds  - Make Fall Risk Sign visible to staff  - Apply yellow socks and bracelet  for high fall risk patients  - Consider moving patient to room near nurses station  Outcome: Progressing  Goal: Maintain or return to baseline ADL function  Description: INTERVENTIONS:  -  Assess patient's ability to carry out ADLs; assess patient's baseline for ADL function and identify physical deficits which impact ability to perform ADLs (bathing, care of mouth/teeth, toileting, grooming, dressing, etc.)  - Assess/evaluate cause of self-care deficits   - Assess range of motion  - Assess patient's mobility; develop plan if impaired  - Assess patient's need for assistive devices and provide as appropriate  - Encourage maximum independence but intervene and supervise when necessary  - Involve family in performance of ADLs  - Assess for home care needs following discharge   - Consider OT consult to assist with ADL evaluation and planning for discharge  - Provide patient education as appropriate  Outcome: Progressing     Problem: Knowledge Deficit  Goal: Patient/family/caregiver demonstrates understanding of disease process, treatment plan, medications, and discharge instructions  Description: Complete learning assessment and assess knowledge base.  Interventions:  - Provide teaching at level of understanding  - Provide teaching via preferred learning methods  Outcome: Progressing

## 2024-05-15 NOTE — ASSESSMENT & PLAN NOTE
Mood stable, continue home dose fluoxetine 40 mg daily and oral lorazepam 0.5 mg every 6 hours as needed

## 2024-05-15 NOTE — ASSESSMENT & PLAN NOTE
Follows with [facility] hematology, receives IV Venofer as outpatient.    Appears baseline hemoglobin 8-10  Hemoglobin 9.2 today, will repeat in the morning

## 2024-05-15 NOTE — ASSESSMENT & PLAN NOTE
Lab Results   Component Value Date    EGFR 56 02/14/2024    EGFR 57 04/17/2023    EGFR 63 12/08/2021    CREATININE 1.00 02/14/2024    CREATININE 0.99 04/17/2023    CREATININE 0.93 12/08/2021   Baseline creatinine appears 0.9-1  Avoid/limit nephrotoxins and hypotension

## 2024-05-15 NOTE — ASSESSMENT & PLAN NOTE
Known history of von Willebrand disease, will obtain CBC along with PT/INR and APTT  Patient follows with [facility] hematology Dr. Marcelo.  Per outpatient gastroenterologist discussion with him he recommends Humate-P 1500 international unit 30-60 minutes prior to colonoscopy and Humate-P 900 units every 12 hours x2-3 with premedication of Solumedrol 40mg IV prior to first dose of Humate-P, additionally with Benadryl PRN.    Orders placed for this

## 2024-05-15 NOTE — ASSESSMENT & PLAN NOTE
Patient noting longstanding history of intermittent hematochezia initially suspected from external hemorrhoids, now with increased frequency of the hematochezia now following with GI.  Reportedly with prior history of microscopic colitis noted on colon biopsy after colonoscopy 2020 however has never received full treatment for this  EGD/colonoscopy tentatively planned for 5/16/2024 with GI with biopsies, however given history of von Willebrand disease and need for Humate-P direct admission arranged for bowel prep and pretreatment  Per Dr. Marcelo (patient's hematologist)-recommend Humate-P 1500 international unit 30-60 minutes prior to colonoscopy and Humate P 900 units every 12 hr x2-3 with premedication with Solu-Medrol 40 mg IV prior to her 1st dose of Humate P and Benadryl p.r.n.  GI consulted  Start bowel preparation, clear liquid diet otherwise.  N.p.o. after midnight  Monitor intermittent hemoglobins, transfuse for hemoglobin less than 7 or markedly symptomatic   Pt is being seen by Dr Velasco with [facility] in [place]. I am his point person for this procedure as he has been contacted by many different clinics to schedule things and is very confused. He will be scheduled for a DMITRY/DCCV at [facility] in [place] and will follow up with cardiology in [place]. Meche Steiner RN Cardiology June 12, 2023, 10:24 AM

## 2024-05-15 NOTE — ASSESSMENT & PLAN NOTE
Not in acute exacerbation  On Qvar twice daily at home, since nonformulary will place on fluticasone daily  Continue as needed albuterol

## 2024-05-16 ENCOUNTER — HOSPITAL ENCOUNTER (OUTPATIENT)
Dept: GASTROENTEROLOGY | Facility: HOSPITAL | Age: 74
Setting detail: OUTPATIENT SURGERY
End: 2024-05-16
Payer: COMMERCIAL

## 2024-05-16 ENCOUNTER — APPOINTMENT (INPATIENT)
Dept: GASTROENTEROLOGY | Facility: HOSPITAL | Age: 74
DRG: 378 | End: 2024-05-16
Payer: COMMERCIAL

## 2024-05-16 ENCOUNTER — ANESTHESIA EVENT (INPATIENT)
Dept: GASTROENTEROLOGY | Facility: HOSPITAL | Age: 74
End: 2024-05-16
Payer: COMMERCIAL

## 2024-05-16 ENCOUNTER — ANESTHESIA (INPATIENT)
Dept: GASTROENTEROLOGY | Facility: HOSPITAL | Age: 74
End: 2024-05-16
Payer: COMMERCIAL

## 2024-05-16 VITALS
TEMPERATURE: 96.2 F | HEART RATE: 67 BPM | SYSTOLIC BLOOD PRESSURE: 108 MMHG | DIASTOLIC BLOOD PRESSURE: 56 MMHG | RESPIRATION RATE: 18 BRPM | OXYGEN SATURATION: 99 %

## 2024-05-16 DIAGNOSIS — K92.1 HEMATOCHEZIA: ICD-10-CM

## 2024-05-16 DIAGNOSIS — D64.9 ANEMIA, UNSPECIFIED TYPE: ICD-10-CM

## 2024-05-16 PROBLEM — E87.8 ELECTROLYTE ABNORMALITY: Status: ACTIVE | Noted: 2024-05-16

## 2024-05-16 LAB
ANION GAP SERPL CALCULATED.3IONS-SCNC: 12 MMOL/L (ref 4–13)
BASOPHILS # BLD AUTO: 0.04 THOUSANDS/ÂΜL (ref 0–0.1)
BASOPHILS NFR BLD AUTO: 1 % (ref 0–1)
BUN SERPL-MCNC: 10 MG/DL (ref 5–25)
CALCIUM SERPL-MCNC: 9.3 MG/DL (ref 8.4–10.2)
CHLORIDE SERPL-SCNC: 100 MMOL/L (ref 96–108)
CO2 SERPL-SCNC: 25 MMOL/L (ref 21–32)
CREAT SERPL-MCNC: 1.13 MG/DL (ref 0.6–1.3)
EOSINOPHIL # BLD AUTO: 0.16 THOUSAND/ÂΜL (ref 0–0.61)
EOSINOPHIL NFR BLD AUTO: 3 % (ref 0–6)
ERYTHROCYTE [DISTWIDTH] IN BLOOD BY AUTOMATED COUNT: 17 % (ref 11.6–15.1)
GFR SERPL CREATININE-BSD FRML MDRD: 49 ML/MIN/1.73SQ M
GLUCOSE SERPL-MCNC: 86 MG/DL (ref 65–140)
HCT VFR BLD AUTO: 28.5 % (ref 34.8–46.1)
HGB BLD-MCNC: 9.2 G/DL (ref 11.5–15.4)
IMM GRANULOCYTES # BLD AUTO: 0.04 THOUSAND/UL (ref 0–0.2)
IMM GRANULOCYTES NFR BLD AUTO: 1 % (ref 0–2)
LYMPHOCYTES # BLD AUTO: 1.11 THOUSANDS/ÂΜL (ref 0.6–4.47)
LYMPHOCYTES NFR BLD AUTO: 20 % (ref 14–44)
MAGNESIUM SERPL-MCNC: 1.7 MG/DL (ref 1.9–2.7)
MCH RBC QN AUTO: 32.3 PG (ref 26.8–34.3)
MCHC RBC AUTO-ENTMCNC: 32.3 G/DL (ref 31.4–37.4)
MCV RBC AUTO: 100 FL (ref 82–98)
MONOCYTES # BLD AUTO: 0.6 THOUSAND/ÂΜL (ref 0.17–1.22)
MONOCYTES NFR BLD AUTO: 11 % (ref 4–12)
NEUTROPHILS # BLD AUTO: 3.59 THOUSANDS/ÂΜL (ref 1.85–7.62)
NEUTS SEG NFR BLD AUTO: 64 % (ref 43–75)
NRBC BLD AUTO-RTO: 0 /100 WBCS
PLATELET # BLD AUTO: 255 THOUSANDS/UL (ref 149–390)
PMV BLD AUTO: 9.8 FL (ref 8.9–12.7)
POTASSIUM SERPL-SCNC: 3.4 MMOL/L (ref 3.5–5.3)
RBC # BLD AUTO: 2.85 MILLION/UL (ref 3.81–5.12)
SODIUM SERPL-SCNC: 137 MMOL/L (ref 135–147)
WBC # BLD AUTO: 5.54 THOUSAND/UL (ref 4.31–10.16)

## 2024-05-16 PROCEDURE — 80048 BASIC METABOLIC PNL TOTAL CA: CPT | Performed by: STUDENT IN AN ORGANIZED HEALTH CARE EDUCATION/TRAINING PROGRAM

## 2024-05-16 PROCEDURE — 0DB68ZX EXCISION OF STOMACH, VIA NATURAL OR ARTIFICIAL OPENING ENDOSCOPIC, DIAGNOSTIC: ICD-10-PCS | Performed by: INTERNAL MEDICINE

## 2024-05-16 PROCEDURE — 85025 COMPLETE CBC W/AUTO DIFF WBC: CPT | Performed by: STUDENT IN AN ORGANIZED HEALTH CARE EDUCATION/TRAINING PROGRAM

## 2024-05-16 PROCEDURE — 88305 TISSUE EXAM BY PATHOLOGIST: CPT | Performed by: PATHOLOGY

## 2024-05-16 PROCEDURE — 88313 SPECIAL STAINS GROUP 2: CPT | Performed by: PATHOLOGY

## 2024-05-16 PROCEDURE — 88342 IMHCHEM/IMCYTCHM 1ST ANTB: CPT | Performed by: PATHOLOGY

## 2024-05-16 PROCEDURE — 88360 TUMOR IMMUNOHISTOCHEM/MANUAL: CPT | Performed by: PATHOLOGY

## 2024-05-16 PROCEDURE — 99232 SBSQ HOSP IP/OBS MODERATE 35: CPT | Performed by: STUDENT IN AN ORGANIZED HEALTH CARE EDUCATION/TRAINING PROGRAM

## 2024-05-16 PROCEDURE — 88341 IMHCHEM/IMCYTCHM EA ADD ANTB: CPT | Performed by: PATHOLOGY

## 2024-05-16 PROCEDURE — 0DBE8ZX EXCISION OF LARGE INTESTINE, VIA NATURAL OR ARTIFICIAL OPENING ENDOSCOPIC, DIAGNOSTIC: ICD-10-PCS | Performed by: INTERNAL MEDICINE

## 2024-05-16 PROCEDURE — 43239 EGD BIOPSY SINGLE/MULTIPLE: CPT | Performed by: INTERNAL MEDICINE

## 2024-05-16 PROCEDURE — 83735 ASSAY OF MAGNESIUM: CPT | Performed by: STUDENT IN AN ORGANIZED HEALTH CARE EDUCATION/TRAINING PROGRAM

## 2024-05-16 PROCEDURE — 45380 COLONOSCOPY AND BIOPSY: CPT | Performed by: INTERNAL MEDICINE

## 2024-05-16 RX ORDER — LIDOCAINE HYDROCHLORIDE 20 MG/ML
INJECTION, SOLUTION EPIDURAL; INFILTRATION; INTRACAUDAL; PERINEURAL AS NEEDED
Status: DISCONTINUED | OUTPATIENT
Start: 2024-05-16 | End: 2024-05-16

## 2024-05-16 RX ORDER — MAGNESIUM SULFATE HEPTAHYDRATE 40 MG/ML
2 INJECTION, SOLUTION INTRAVENOUS ONCE
Status: COMPLETED | OUTPATIENT
Start: 2024-05-16 | End: 2024-05-16

## 2024-05-16 RX ORDER — PANTOPRAZOLE SODIUM 40 MG/1
40 TABLET, DELAYED RELEASE ORAL
Status: DISCONTINUED | OUTPATIENT
Start: 2024-05-16 | End: 2024-05-17 | Stop reason: HOSPADM

## 2024-05-16 RX ORDER — SODIUM CHLORIDE 9 MG/ML
INJECTION, SOLUTION INTRAVENOUS CONTINUOUS PRN
Status: DISCONTINUED | OUTPATIENT
Start: 2024-05-16 | End: 2024-05-16

## 2024-05-16 RX ORDER — PROPOFOL 10 MG/ML
INJECTION, EMULSION INTRAVENOUS CONTINUOUS PRN
Status: DISCONTINUED | OUTPATIENT
Start: 2024-05-16 | End: 2024-05-16

## 2024-05-16 RX ORDER — POTASSIUM CHLORIDE 20 MEQ/1
40 TABLET, EXTENDED RELEASE ORAL ONCE
Status: COMPLETED | OUTPATIENT
Start: 2024-05-16 | End: 2024-05-16

## 2024-05-16 RX ORDER — PROPOFOL 10 MG/ML
INJECTION, EMULSION INTRAVENOUS AS NEEDED
Status: DISCONTINUED | OUTPATIENT
Start: 2024-05-16 | End: 2024-05-16

## 2024-05-16 RX ADMIN — PROPOFOL 50 MG: 10 INJECTION, EMULSION INTRAVENOUS at 10:14

## 2024-05-16 RX ADMIN — FLECAINIDE ACETATE 75 MG: 50 TABLET ORAL at 17:32

## 2024-05-16 RX ADMIN — DIPHENHYDRAMINE HYDROCHLORIDE 50 MG: 50 INJECTION, SOLUTION INTRAMUSCULAR; INTRAVENOUS at 23:52

## 2024-05-16 RX ADMIN — PROPOFOL 120 MG: 10 INJECTION, EMULSION INTRAVENOUS at 10:11

## 2024-05-16 RX ADMIN — DIPHENHYDRAMINE HYDROCHLORIDE 50 MG: 50 INJECTION, SOLUTION INTRAMUSCULAR; INTRAVENOUS at 08:22

## 2024-05-16 RX ADMIN — METHYLPREDNISOLONE SODIUM SUCCINATE 40 MG: 40 INJECTION, POWDER, FOR SOLUTION INTRAMUSCULAR; INTRAVENOUS at 08:37

## 2024-05-16 RX ADMIN — ANTIHEMOPHILIC FACTOR/VON WILLEBRAND FACTOR COMPLEX (HUMAN) 927 UNITS: KIT at 12:15

## 2024-05-16 RX ADMIN — POTASSIUM CHLORIDE 40 MEQ: 1500 TABLET, EXTENDED RELEASE ORAL at 14:18

## 2024-05-16 RX ADMIN — PROPOFOL 40 MG: 10 INJECTION, EMULSION INTRAVENOUS at 10:18

## 2024-05-16 RX ADMIN — LIDOCAINE HYDROCHLORIDE 3 ML: 20 INJECTION, SOLUTION EPIDURAL; INFILTRATION; INTRACAUDAL at 10:10

## 2024-05-16 RX ADMIN — B-COMPLEX W/ C & FOLIC ACID TAB 1 TABLET: TAB at 11:44

## 2024-05-16 RX ADMIN — Medication 1000 UNITS: at 11:44

## 2024-05-16 RX ADMIN — SODIUM CHLORIDE: 0.9 INJECTION, SOLUTION INTRAVENOUS at 10:05

## 2024-05-16 RX ADMIN — PROPOFOL 30 MG: 10 INJECTION, EMULSION INTRAVENOUS at 10:23

## 2024-05-16 RX ADMIN — FLECAINIDE ACETATE 75 MG: 50 TABLET ORAL at 11:44

## 2024-05-16 RX ADMIN — FLUOXETINE 40 MG: 20 CAPSULE ORAL at 11:44

## 2024-05-16 RX ADMIN — PANTOPRAZOLE SODIUM 40 MG: 40 TABLET, DELAYED RELEASE ORAL at 11:45

## 2024-05-16 RX ADMIN — MAGNESIUM SULFATE HEPTAHYDRATE 2 G: 40 INJECTION, SOLUTION INTRAVENOUS at 12:19

## 2024-05-16 RX ADMIN — PROPOFOL 80 MCG/KG/MIN: 10 INJECTION, EMULSION INTRAVENOUS at 10:20

## 2024-05-16 NOTE — UTILIZATION REVIEW
Initial Clinical Review    Admission: Date/Time/Statement:   Admission Orders (From admission, onward)       Ordered        05/15/24 1143  INPATIENT ADMISSION  Once                          Orders Placed This Encounter   Procedures    INPATIENT ADMISSION     Standing Status:   Standing     Number of Occurrences:   1     Order Specific Question:   Level of Care     Answer:   Med Surg [16]     Order Specific Question:   Estimated length of stay     Answer:   More than 2 Midnights     Order Specific Question:   Certification     Answer:   I certify that inpatient services are medically necessary for this patient for a duration of greater than two midnights. See H&P and MD Progress Notes for additional information about the patient's course of treatment.     Initial Presentation: 73 y.o. female with a PMH of hypertension, asthma, A-fib, von Willebrand's disease, chronic diarrhea in the setting of IBS, microscopic colitis and JAVIER who presents for bowel prep for EGD/colon in the setting of hematochezia, chronic anemia and history of microscopic colitis. She continues to have episodes of watery diarrhea with occasional bright red blood.  Chronic diarrhea has been ongoing for years and is currently stable. Direct Inpatient admission to med surg, order CBC, PT, INR, APTT. Per outpatient gastroenterologist discussion with him he recommends Humate-P 1500 international unit 30-60 minutes prior to colonoscopy and Humate-P 900 units every 12 hours x2-3 with premedication of Solumedrol 40mg IV prior to first dose of Humate-P, additionally with Benadryl PRN.  Per outpatient gastroenterologist discussion with Dr. Marcelo, he recommends Humate-P 1500 international unit 30-60 minutes prior to colonoscopy and Humate-P 900 units every 12 hours x2-3 with premedication of Solumedrol 40mg IV prior to first dose of Humate-P, additionally with Benadryl PRN. GI consulted, start bowel prep, clear liquid diet, NPO after MN. Monitor hgb and  transfuse <7 or symptomatic, baseline hemoglobin 8-10. Monitor Cr. Continue home meds for asthma, anxiety, pAFib.      Anticipated Length of Stay/Certification Statement: Patient will be admitted on an inpatient basis with an anticipated length of stay of greater than 2 midnights secondary to GI intervention.     Date: 5/16   Day 2: No new complaints or concerns today. Continue above tx plan including NPO for colonoscopy, fu on labs, monitor VS, continue home meds. CM following.     Date: 5/17  Day 3: Has surpassed a 2nd midnight with active treatments and services. EGD/Colonoscopy completed yesterday. Pt stable for discharge today.      Triage Vitals [05/15/24 1200]   Temperature Pulse Respirations Blood Pressure SpO2   97.5 °F (36.4 °C) 60 18 (!) 104/49 99 %      Temp src Heart Rate Source Patient Position - Orthostatic VS BP Location FiO2 (%)   -- -- -- -- --      Pain Score       No Pain          Wt Readings from Last 1 Encounters:   05/16/24 62.1 kg (137 lb)     Additional Vital Signs:       Pertinent Labs/Diagnostic Test Results:   No orders to display         Results from last 7 days   Lab Units 05/17/24  0552 05/16/24  0508 05/15/24  1231   WBC Thousand/uL 9.39 5.54 7.23   HEMOGLOBIN g/dL 8.9* 9.2* 9.2*   HEMATOCRIT % 27.9* 28.5* 29.9*   PLATELETS Thousands/uL 267 255 277   TOTAL NEUT ABS Thousands/µL 7.42 3.59 5.58         Results from last 7 days   Lab Units 05/17/24  0552 05/16/24  0508   SODIUM mmol/L 139 137   POTASSIUM mmol/L 4.1 3.4*   CHLORIDE mmol/L 107 100   CO2 mmol/L 24 25   ANION GAP mmol/L 8 12   BUN mg/dL 16 10   CREATININE mg/dL 1.11 1.13   EGFR ml/min/1.73sq m 50 49   CALCIUM mg/dL 8.4 9.3   MAGNESIUM mg/dL  --  1.7*             Results from last 7 days   Lab Units 05/17/24  0552 05/16/24  0508   GLUCOSE RANDOM mg/dL 111 86       Past Medical History:   Diagnosis Date    Anxiety     Asthma     Atrial fibrillation (HCC)     Bowel obstruction (HCC)     Hypertension     PAF (paroxysmal atrial  fibrillation) (HCC)     Von Willebrand disease (HCC)      Present on Admission:   Von Willebrand disease (HCC)   Stage 3 chronic kidney disease, unspecified whether stage 3a or 3b CKD (HCC)   Paroxysmal atrial fibrillation (HCC)   Hematochezia   Anemia   Asthma   Anxiety disorder      Admitting Diagnosis: Von Willebrand disease (HCC) [D68.00]  Age/Sex: 73 y.o. female  Admission Orders:  Scheduled Medications:  antihemophilic factor 250-1000 units -2400 units syringe, 927 Units, Intravenous, Q12H  Cholecalciferol, 1,000 Units, Oral, Daily  flecainide, 75 mg, Oral, BID  FLUoxetine, 40 mg, Oral, Daily  fluticasone, 1 puff, Inhalation, Daily  losartan, 100 mg, Oral, Daily  metoprolol succinate, 50 mg, Oral, Daily  multivitamin stress formula, 1 tablet, Oral, Daily  pantoprazole, 40 mg, Oral, Early Morning      Continuous IV Infusions: none     PRN Meds:  acetaminophen, 650 mg, Oral, Q6H PRN  albuterol, 2 puff, Inhalation, Q6H PRN  diphenhydrAMINE (BENADRYL) 50 mg in sodium chloride 0.9 % 50 mL IVPB, 50 mg, Intravenous, BID PRN x2  LORazepam, 0.5 mg, Oral, Q6H PRN  ondansetron, 4 mg, Intravenous, Q6H PRN        IP CONSULT TO GASTROENTEROLOGY    Network Utilization Review Department  ATTENTION: Please call with any questions or concerns to [phone] and carefully listen to the prompts so that you are directed to the right person. All voicemails are confidential.   For Discharge needs, contact Care Management DC Support Team at [phone] opt. 2  Send all requests for admission clinical reviews, approved or denied determinations and any other requests to dedicated fax number below belonging to the campus where the patient is receiving treatment. List of dedicated fax numbers for the Facilities:  FACILITY NAME UR FAX NUMBER   ADMISSION DENIALS (Administrative/Medical Necessity) [phone]   DISCHARGE SUPPORT TEAM (NETWORK) [phone]   PARENT CHILD HEALTH (Maternity/NICU/Pediatrics) [phone]   [facility] [phone]   [facility] [phone]   [facility] [phone]   [facility] [phone]   [facility] [phone]   [facility] [phone]   [facility] [phone]   [facility] [phone]   [facility] [phone]   [facility] [phone]   [facility] [phone]   [facility] [phone]

## 2024-05-16 NOTE — PLAN OF CARE
Problem: PAIN - ADULT  Goal: Verbalizes/displays adequate comfort level or baseline comfort level  Description: Interventions:  - Encourage patient to monitor pain and request assistance  - Assess pain using appropriate pain scale  - Administer analgesics based on type and severity of pain and evaluate response  - Implement non-pharmacological measures as appropriate and evaluate response  - Consider cultural and social influences on pain and pain management  - Notify physician/advanced practitioner if interventions unsuccessful or patient reports new pain  Outcome: Progressing     Problem: INFECTION - ADULT  Goal: Absence or prevention of progression during hospitalization  Description: INTERVENTIONS:  - Assess and monitor for signs and symptoms of infection  - Monitor lab/diagnostic results  - Monitor all insertion sites, i.e. indwelling lines, tubes, and drains  - Monitor endotracheal if appropriate and nasal secretions for changes in amount and color  - [place] appropriate cooling/warming therapies per order  - Administer medications as ordered  - Instruct and encourage patient and family to use good hand hygiene technique  - Identify and instruct in appropriate isolation precautions for identified infection/condition  Outcome: Progressing     Problem: DISCHARGE PLANNING  Goal: Discharge to home or other facility with appropriate resources  Description: INTERVENTIONS:  - Identify barriers to discharge w/patient and caregiver  - Arrange for needed discharge resources and transportation as appropriate  - Identify discharge learning needs (meds, wound care, etc.)  - Arrange for interpretive services to assist at discharge as needed  - Refer to Case Management Department for coordinating discharge planning if the patient needs post-hospital services based on physician/advanced practitioner order or complex needs related to functional status, cognitive ability, or social support system  Outcome: Progressing

## 2024-05-16 NOTE — CASE MANAGEMENT
Case Management Assessment & Discharge Planning Note    Patient name Ari Parsons  Location [facility] MRN [id number]  : 1950 Date 2024       Current Admission Date: 5/15/2024  Current Admission Diagnosis:Von Willebrand disease (HCC)   Patient Active Problem List    Diagnosis Date Noted Date Diagnosed    Hematochezia 05/15/2024     Stage 3 chronic kidney disease, unspecified whether stage 3a or 3b CKD (HCC) 2024     Diarrhea 2020     Anemia 2020     Alcohol use 2020     Essential hypertension 2020     Anxiety disorder 2020     Acute gastritis without hemorrhage 2019     Irritable bowel disease 2019     Typical atrial flutter (HCC) 2018     Hiatal hernia 2018     Von Willebrand disease (HCC) 10/02/2016     Paroxysmal atrial fibrillation (HCC) 10/01/2016     Asthma 10/01/2016     Bronchitis 10/01/2016       LOS (days): 1  Geometric Mean LOS (GMLOS) (days): 3  Days to GMLOS:2     OBJECTIVE:    Risk of Unplanned Readmission Score: 7.65         Current admission status: Inpatient       Preferred Pharmacy:   [id number] - [place], [place] - [address] [address]  Phone: [phone] Fax: [phone]    Primary Care Provider: Donovan Tolentino MD    Primary Insurance: [employer] RE[employer]  Secondary Insurance:     ASSESSMENT:  Active Health Care Proxies       Maxi Parsons Health Care Representative - Spouse   Primary Phone: [phone] (Home)                 Advance Directives  Does patient have a Health Care POA?: No  Was patient offered paperwork?: Yes (provided)  Does patient currently have a Health Care decision maker?: Yes, please see Health Care Proxy section  Does patient have Advance Directives?: No  Was patient offered paperwork?: Yes (provided)  Primary Contact: , Maxi         Readmission Root Cause  30 Day Readmission: No    Patient Information  Admitted from:: Home  Mental  Status: Alert  During Assessment patient was accompanied by: Not accompanied during assessment  Assessment information provided by:: Patient  Primary Caregiver: Self  Support Systems: Spouse/significant other, Family members  What city do you live in?: [place]  Home entry access options. Select all that apply.: Stairs  Number of steps to enter home.: 7  Do the steps have railings?: No  Type of Current Residence: 2 story home  Upon entering residence, is there a bedroom on the main floor (no further steps)?: No  A bedroom is located on the following floor levels of residence (select all that apply):: 2nd Floor  Upon entering residence, is there a bathroom on the main floor (no further steps)?: No  Indicate which floors of current residence have a bathroom (select all the apply):: 2nd Floor  Number of steps to 2nd floor from main floor: One Flight  Living Arrangements: Lives w/ Spouse/significant other    Activities of Daily Living Prior to Admission  Functional Status: Independent  Completes ADLs independently?: Yes  Ambulates independently?: Yes  Does patient use assisted devices?: No  Does patient currently own DME?: No  Does patient have a history of Outpatient Therapy (PT/OT)?: No  Does the patient have a history of Short-Term Rehab?: No  Does patient currently have HHC?: No         Patient Information Continued  Income Source: Pension/nursing home  Does patient have prescription coverage?: No  Does patient receive dialysis treatments?: No  Does patient have a history of substance abuse?: No  Does patient have a history of Mental Health Diagnosis?: Yes  Is patient receiving treatment for mental health?: Yes (treated for anxiety w/ medication by PCP. no needs)  Has patient received inpatient treatment related to mental health in the last 2 years?: No         Means of Transportation  Means of Transport to [facility]:: Drives Self      Social Determinants of Health (SDOH)      Flowsheet Row Most Recent Value   Housing  Stability    In the last 12 months, was there a time when you were not able to pay the mortgage or rent on time? N   In the past 12 months, how many times have you moved where you were living? 1   At any time in the past 12 months, were you homeless or living in a shelter (including now)? N   Transportation Needs    In the past 12 months, has lack of transportation kept you from medical appointments or from getting medications? no   In the past 12 months, has lack of transportation kept you from meetings, work, or from getting things needed for daily living? No   Food Insecurity    Within the past 12 months, you worried that your food would run out before you got the money to buy more. Never true   Within the past 12 months, the food you bought just didn't last and you didn't have money to get more. Never true   Utilities    In the past 12 months has the electric, gas, oil, or water company threatened to shut off services in your home? No            DISCHARGE DETAILS:    Discharge planning discussed with:: patient        CM contacted family/caregiver?: No- see comments (declined)             Contacts  Patient Contacts: patient/self  Contact Method: In Person  Reason/Outcome: Emergency Contact, Discharge Planning, Continuity of Care         Would you like to participate in our [facility] service program?  : No - Declined             Additional Comments: CM met with patient to introduce CM role/team, obtain baseline assessment information and discuss DCP. Pt lives w/ spouse in 2 story home. IPTA. No DME, No prior HC or rehab. Denies h/o SA treatement. Pt is treated by PCP for anxiety w/ medications. No d/c needs evaluated.

## 2024-05-16 NOTE — ASSESSMENT & PLAN NOTE
7/12/2023      Ayo Brizuela   [place] [address]      Dear Ayo,     Our office attempted to contact you by phone to discuss your lab result and schedule you a follow up appointment per . She reviewed your results and A1c is elevated and would like you to schedule a follow up. Please give the office a call at your earliest convenience to get schedule as her schedule fills up quickly.     Resulted Orders   Glycohemoglobin   Result Value Ref Range    Hemoglobin A1C 12.9 (H) 4.5 - 5.6 %       If you have any further questions, please feel free to call.     Sincerely,     Freida Alejandra MD  [facility] [address] [place] [id number] [phone]    Matter[employer] is a powerful new Internet tool that is quick, convenient, and confidential.  With Matter.io, you can view your results faster; communicate on-line with your [facility] physician's office; schedule many types of appointments; and find helpful healthcare links.    Visit [email or web address] soon and often. Sign up, take a quick tour, view important information, and stay in touch with [facility]. We're There When You Need Us.     Known history of von Willebrand disease, will obtain CBC along with PT/INR and APTT  Patient [facility] with [facility] hematology Dr. Marcelo.  Per outpatient gastroenterologist discussion with him recommendation to provide Humate-P 1500 international unit 30-60 minutes prior to colonoscopy and Humate-P 900 units every 12 hours x2-3 with premedication of Solumedrol 40mg IV prior to first dose of Humate-P, additionally with Benadryl PRN.    Discussed with pharmacy and currently on formulary we have 1854 u prior to colonosocpy with Solumedrol, followed by 927 u BID x 3 doses  Follow CBC

## 2024-05-16 NOTE — ASSESSMENT & PLAN NOTE
Lab Results   Component Value Date    EGFR 49 05/16/2024    EGFR 56 02/14/2024    EGFR 57 04/17/2023    CREATININE 1.13 05/16/2024    CREATININE 1.00 02/14/2024    CREATININE 0.99 04/17/2023   Baseline creatinine appears 0.9-1  Avoid/limit nephrotoxins and hypotension

## 2024-05-16 NOTE — ANESTHESIA PREPROCEDURE EVALUATION
Procedure:  COLONOSCOPY  EGD    Relevant Problems   CARDIO   (+) Essential hypertension   (+) Paroxysmal atrial fibrillation (HCC)   (+) Typical atrial flutter (HCC)      GI/HEPATIC   (+) Hiatal hernia      /RENAL   (+) Stage 3 chronic kidney disease, unspecified whether stage 3a or 3b CKD (HCC)      HEMATOLOGY   (+) Anemia   (+) Von Willebrand disease (HCC)      MUSCULOSKELETAL   (+) Hiatal hernia      NEURO/PSYCH   (+) Anxiety disorder      PULMONARY   (+) Asthma   Hx of vWdisease received humate P at 0840 today as well as benadryl and decadron as she has gotten a rash on her body and some nausea in the past with humate P.      Physical Exam    Airway      TM Distance: >3 FB  Neck ROM: full     Dental       Cardiovascular  Cardiovascular exam normal    Pulmonary  Pulmonary exam normal     Other Findings  post-pubertal.      Anesthesia Plan  ASA Score- 3     Anesthesia Type- IV sedation with anesthesia with ASA Monitors.         Additional Monitors:     Airway Plan:            Plan Factors-Exercise tolerance (METS): >4 METS.    Chart reviewed. EKG reviewed.   Patient summary reviewed.    Patient is not a current smoker.  Patient did not smoke on day of surgery.    Obstructive sleep apnea risk education given perioperatively.        Induction- intravenous.    Postoperative Plan-     Perioperative Resuscitation Plan - Level 1 - Full Code.       Informed Consent- Anesthetic plan and risks discussed with patient.  I personally reviewed this patient with the CRNA. Discussed and agreed on the Anesthesia Plan with the CRNA..

## 2024-05-16 NOTE — PROGRESS NOTES
[facility]  Progress Note  Name: Ari Parsons I  MRN: [id number]  Unit/Bed#: [facility] 815-01 I Date of Admission: 5/15/2024   Date of Service: 5/16/2024 I Hospital Day: 1    Assessment & Plan   Electrolyte abnormality  Assessment & Plan  K 3.4 and Mg 1.7  Repleted   Repeat BMP in the morning    Hematochezia  Assessment & Plan  Patient noting longstanding history of intermittent hematochezia initially suspected from external hemorrhoids, now with increased frequency of the hematochezia now following with GI.  Reportedly with prior history of microscopic colitis noted on colon biopsy after colonoscopy 2020 however has never received full treatment for this  EGD/colonoscopy today  Pre treatment with Humane P in the setting of vWD ordered    Stage 3 chronic kidney disease, unspecified whether stage 3a or 3b CKD ([facility])  Assessment & Plan  Lab Results   Component Value Date    EGFR 49 05/16/2024    EGFR 56 02/14/2024    EGFR 57 04/17/2023    CREATININE 1.13 05/16/2024    CREATININE 1.00 02/14/2024    CREATININE 0.99 04/17/2023   Baseline creatinine appears 0.9-1  Avoid/limit nephrotoxins and hypotension    Anemia  Assessment & Plan  Follows with [facility] hematology, receives IV Venofer as outpatient.    Appears baseline hemoglobin 8-10  Hemoglobin 9.2 today, will repeat in the morning    Anxiety disorder  Assessment & Plan  Mood stable, continue home dose fluoxetine 40 mg daily and oral lorazepam 0.5 mg every 6 hours as needed    Asthma  Assessment & Plan  Not in acute exacerbation  On Qvar twice daily at home, since nonformulary will place on fluticasone daily  Continue as needed albuterol    Paroxysmal atrial fibrillation (HCC)  Assessment & Plan  Continue metoprolol succinate 50 mg daily and flecainide 75 mg twice daily  Currently normal sinus rhythm  Not on anticoagulation in the setting of hematochezia    * Von Willebrand disease (HCC)  Assessment & Plan  Known history of von  Willebrand disease, will obtain CBC along with PT/INR and APTT  Patient follows with [facility] hematology Dr. Marcelo.  Per outpatient gastroenterologist discussion with him recommendation to provide Humate-P 1500 international unit 30-60 minutes prior to colonoscopy and Humate-P 900 units every 12 hours x2-3 with premedication of Solumedrol 40mg IV prior to first dose of Humate-P, additionally with Benadryl PRN.    Discussed with pharmacy and currently on formulary we have 1854 u prior to colonosocpy with Solumedrol, followed by 927 u BID x 3 doses  Follow CBC               VTE Pharmacologic Prophylaxis: VTE Score: 3 Moderate Risk (Score 3-4) - Pharmacological DVT Prophylaxis Contraindicated. Sequential Compression Devices Ordered.    Mobility:   Basic Mobility Inpatient Raw Score: 24  JH-HLM Goal: 8: Walk 250 feet or more  JH-HLM Achieved: 6: Walk 10 steps or more  JH-HLM Goal NOT achieved. Continue with multidisciplinary rounding and encourage appropriate mobility to improve upon JH-HLM goals.    Patient Centered Rounds: I performed bedside rounds with nursing staff today.   Discussions with Specialists or Other Care Team Provider: CM    Education and Discussions with Family / Patient:  Patient will update family members.     Total Time Spent on Date of Encounter in care of patient: 35 mins. This time was spent on one or more of the following: performing physical exam; counseling and coordination of care; obtaining or reviewing history; documenting in the medical record; reviewing/ordering tests, medications or procedures; communicating with other healthcare professionals and discussing with patient's family/caregivers.    Current Length of Stay: 1 day(s)  Current Patient Status: Inpatient   Certification Statement: The patient will continue to require additional inpatient hospital stay due to GI intervention, Humate-P, monitoring of blood work  Discharge Plan: Anticipate discharge in 24-48 hrs to home.    Code  Status: Level 1 - Full Code    Subjective:   No events overnight.  Patient reports feeling well this morning.  No complaints.    Objective:     Vitals:   Temp (24hrs), Av.8 °F (36.6 °C), Min:96.2 °F (35.7 °C), Max:98.8 °F (37.1 °C)    Temp:  [96.2 °F (35.7 °C)-98.8 °F (37.1 °C)] 97.7 °F (36.5 °C)  HR:  [53-69] 65  Resp:  [12-18] 18  BP: (100-135)/(49-69) 111/50  SpO2:  [96 %-100 %] 100 %  There is no height or weight on file to calculate BMI.     Input and Output Summary (last 24 hours):     Intake/Output Summary (Last 24 hours) at 2024 1426  Last data filed at 2024 1040  Gross per 24 hour   Intake 618 ml   Output 3 ml   Net 615 ml       Physical Exam:   Physical Exam  Vitals and nursing note reviewed.   Constitutional:       General: She is not in acute distress.     Appearance: She is well-developed.   HENT:      Head: Normocephalic and atraumatic.   Eyes:      Conjunctiva/sclera: Conjunctivae normal.   Cardiovascular:      Rate and Rhythm: Normal rate and regular rhythm.   Pulmonary:      Effort: No respiratory distress.   Musculoskeletal:         General: No swelling.      Cervical back: Neck supple.   Skin:     General: Skin is warm and dry.   Neurological:      Mental Status: She is alert.          Additional Data:     Labs:  Results from last 7 days   Lab Units 24  0508   WBC Thousand/uL 5.54   HEMOGLOBIN g/dL 9.2*   HEMATOCRIT % 28.5*   PLATELETS Thousands/uL 255   SEGS PCT % 64   LYMPHO PCT % 20   MONO PCT % 11   EOS PCT % 3     Results from last 7 days   Lab Units 24  0508   SODIUM mmol/L 137   POTASSIUM mmol/L 3.4*   CHLORIDE mmol/L 100   CO2 mmol/L 25   BUN mg/dL 10   CREATININE mg/dL 1.13   ANION GAP mmol/L 12   CALCIUM mg/dL 9.3   GLUCOSE RANDOM mg/dL 86                       Lines/Drains:  Invasive Devices       Peripheral Intravenous Line  Duration             Peripheral IV 05/15/24 Distal;Dorsal (posterior);Right Forearm 1 day                          Imaging: No pertinent  imaging reviewed.    Recent Cultures (last 7 days):         Last 24 Hours Medication List:   Current Facility-Administered Medications   Medication Dose Route Frequency Provider Last Rate    acetaminophen  650 mg Oral Q6H PRN Leda Rosa MD      albuterol  2 puff Inhalation Q6H PRN Leda Rosa MD      [START ON 5/17/2024] antihemophilic factor 250-1000 units -2400 units syringe  927 Units Intravenous Q12H Leda Rosa MD      Cholecalciferol  1,000 Units Oral Daily Leda Rosa MD      flecainide  75 mg Oral BID Leda Rosa MD      FLUoxetine  40 mg Oral Daily Leda Rosa MD      fluticasone  1 puff Inhalation Daily Leda Rosa MD      LORazepam  0.5 mg Oral Q6H PRN Leda Rosa MD      losartan  100 mg Oral Daily Leda Rosa MD      metoprolol succinate  50 mg Oral Daily Leda Rosa MD      multivitamin stress formula  1 tablet Oral Daily Leda Rosa MD      ondansetron  4 mg Intravenous Q6H PRN Leda Rosa MD      pantoprazole  40 mg Oral Early Morning Benedict Scruggs MD          Today, Patient Was Seen By: Leda Rosa MD    **Please Note: This note may have been constructed using a voice recognition system.**

## 2024-05-16 NOTE — ASSESSMENT & PLAN NOTE
Patient noting longstanding history of intermittent hematochezia initially suspected from external hemorrhoids, now with increased frequency of the hematochezia now following with GI.  Reportedly with prior history of microscopic colitis noted on colon biopsy after colonoscopy 2020 however has never received full treatment for this  EGD/colonoscopy today  Pre treatment with Humane P in the setting of vWD ordered

## 2024-05-16 NOTE — ASSESSMENT & PLAN NOTE
Follows with [facility] hematology, receives IV Venofer as outpatient.    Appears baseline hemoglobin 8-10  Hemoglobin 9.2 today, will repeat in the morning

## 2024-05-16 NOTE — PLAN OF CARE
Problem: PAIN - ADULT  Goal: Verbalizes/displays adequate comfort level or baseline comfort level  Description: Interventions:  - Encourage patient to monitor pain and request assistance  - Assess pain using appropriate pain scale  - Administer analgesics based on type and severity of pain and evaluate response  - Implement non-pharmacological measures as appropriate and evaluate response  - Consider cultural and social influences on pain and pain management  - Notify physician/advanced practitioner if interventions unsuccessful or patient reports new pain  Outcome: Progressing     Problem: INFECTION - ADULT  Goal: Absence or prevention of progression during hospitalization  Description: INTERVENTIONS:  - Assess and monitor for signs and symptoms of infection  - Monitor lab/diagnostic results  - Monitor all insertion sites, i.e. indwelling lines, tubes, and drains  - Monitor endotracheal if appropriate and nasal secretions for changes in amount and color  - [place] appropriate cooling/warming therapies per order  - Administer medications as ordered  - Instruct and encourage patient and family to use good hand hygiene technique  - Identify and instruct in appropriate isolation precautions for identified infection/condition  Outcome: Progressing     Problem: SAFETY ADULT  Goal: Patient will remain free of falls  Description: INTERVENTIONS:  - Educate patient/family on patient safety including physical limitations  - Instruct patient to call for assistance with activity   - Consult OT/PT to assist with strengthening/mobility   - Keep Call bell within reach  - Keep bed low and locked with side rails adjusted as appropriate  - Keep care items and personal belongings within reach  - Initiate and maintain comfort rounds  - Make Fall Risk Sign visible to staff  - Apply yellow socks and bracelet for high fall risk patients  - Consider moving patient to room near nurses station  Outcome: Progressing     Problem: DISCHARGE  PLANNING  Goal: Discharge to home or other facility with appropriate resources  Description: INTERVENTIONS:  - Identify barriers to discharge w/patient and caregiver  - Arrange for needed discharge resources and transportation as appropriate  - Identify discharge learning needs (meds, wound care, etc.)  - Arrange for interpretive services to assist at discharge as needed  - Refer to Case Management Department for coordinating discharge planning if the patient needs post-hospital services based on physician/advanced practitioner order or complex needs related to functional status, cognitive ability, or social support system  Outcome: Progressing     Problem: Knowledge Deficit  Goal: Patient/family/caregiver demonstrates understanding of disease process, treatment plan, medications, and discharge instructions  Description: Complete learning assessment and assess knowledge base.  Interventions:  - Provide teaching at level of understanding  - Provide teaching via preferred learning methods  Outcome: Progressing

## 2024-05-16 NOTE — ANESTHESIA POSTPROCEDURE EVALUATION
Post-Op Assessment Note    CV Status:  Stable  Pain Score: 0    Pain management: adequate       Mental Status:  Arousable and sleepy   Hydration Status:  Stable   PONV Controlled:  None   Airway Patency:  Patent  Two or more mitigation strategies used for obstructive sleep apnea   Post Op Vitals Reviewed: Yes    No anethesia notable event occurred.    Staff: CRNA               /55 (05/16/24 1047)    Temp (!) 96.2 °F (35.7 °C) (05/16/24 1047)    Pulse 68 (05/16/24 1047)   Resp 17 (05/16/24 1047)    SpO2 96 % (05/16/24 1047)

## 2024-05-17 VITALS
SYSTOLIC BLOOD PRESSURE: 116 MMHG | BODY MASS INDEX: 22.82 KG/M2 | OXYGEN SATURATION: 100 % | DIASTOLIC BLOOD PRESSURE: 59 MMHG | WEIGHT: 137 LBS | TEMPERATURE: 98.2 F | HEIGHT: 65 IN | RESPIRATION RATE: 18 BRPM | HEART RATE: 75 BPM

## 2024-05-17 LAB
ANION GAP SERPL CALCULATED.3IONS-SCNC: 8 MMOL/L (ref 4–13)
BASOPHILS # BLD AUTO: 0.02 THOUSANDS/ÂΜL (ref 0–0.1)
BASOPHILS NFR BLD AUTO: 0 % (ref 0–1)
BUN SERPL-MCNC: 16 MG/DL (ref 5–25)
CALCIUM SERPL-MCNC: 8.4 MG/DL (ref 8.4–10.2)
CHLORIDE SERPL-SCNC: 107 MMOL/L (ref 96–108)
CO2 SERPL-SCNC: 24 MMOL/L (ref 21–32)
CREAT SERPL-MCNC: 1.11 MG/DL (ref 0.6–1.3)
EOSINOPHIL # BLD AUTO: 0.05 THOUSAND/ÂΜL (ref 0–0.61)
EOSINOPHIL NFR BLD AUTO: 1 % (ref 0–6)
ERYTHROCYTE [DISTWIDTH] IN BLOOD BY AUTOMATED COUNT: 17.4 % (ref 11.6–15.1)
GFR SERPL CREATININE-BSD FRML MDRD: 50 ML/MIN/1.73SQ M
GLUCOSE SERPL-MCNC: 111 MG/DL (ref 65–140)
HCT VFR BLD AUTO: 27.9 % (ref 34.8–46.1)
HGB BLD-MCNC: 8.9 G/DL (ref 11.5–15.4)
IMM GRANULOCYTES # BLD AUTO: 0.04 THOUSAND/UL (ref 0–0.2)
IMM GRANULOCYTES NFR BLD AUTO: 0 % (ref 0–2)
LYMPHOCYTES # BLD AUTO: 0.96 THOUSANDS/ÂΜL (ref 0.6–4.47)
LYMPHOCYTES NFR BLD AUTO: 10 % (ref 14–44)
MCH RBC QN AUTO: 32 PG (ref 26.8–34.3)
MCHC RBC AUTO-ENTMCNC: 31.9 G/DL (ref 31.4–37.4)
MCV RBC AUTO: 100 FL (ref 82–98)
MONOCYTES # BLD AUTO: 0.9 THOUSAND/ÂΜL (ref 0.17–1.22)
MONOCYTES NFR BLD AUTO: 10 % (ref 4–12)
NEUTROPHILS # BLD AUTO: 7.42 THOUSANDS/ÂΜL (ref 1.85–7.62)
NEUTS SEG NFR BLD AUTO: 79 % (ref 43–75)
NRBC BLD AUTO-RTO: 0 /100 WBCS
PLATELET # BLD AUTO: 267 THOUSANDS/UL (ref 149–390)
PMV BLD AUTO: 9.6 FL (ref 8.9–12.7)
POTASSIUM SERPL-SCNC: 4.1 MMOL/L (ref 3.5–5.3)
RBC # BLD AUTO: 2.78 MILLION/UL (ref 3.81–5.12)
SODIUM SERPL-SCNC: 139 MMOL/L (ref 135–147)
WBC # BLD AUTO: 9.39 THOUSAND/UL (ref 4.31–10.16)

## 2024-05-17 PROCEDURE — 99239 HOSP IP/OBS DSCHRG MGMT >30: CPT | Performed by: STUDENT IN AN ORGANIZED HEALTH CARE EDUCATION/TRAINING PROGRAM

## 2024-05-17 PROCEDURE — 80048 BASIC METABOLIC PNL TOTAL CA: CPT | Performed by: STUDENT IN AN ORGANIZED HEALTH CARE EDUCATION/TRAINING PROGRAM

## 2024-05-17 PROCEDURE — 85025 COMPLETE CBC W/AUTO DIFF WBC: CPT | Performed by: STUDENT IN AN ORGANIZED HEALTH CARE EDUCATION/TRAINING PROGRAM

## 2024-05-17 RX ADMIN — LOSARTAN POTASSIUM 100 MG: 50 TABLET, FILM COATED ORAL at 07:47

## 2024-05-17 RX ADMIN — DIPHENHYDRAMINE HYDROCHLORIDE 50 MG: 50 INJECTION, SOLUTION INTRAMUSCULAR; INTRAVENOUS at 11:48

## 2024-05-17 RX ADMIN — B-COMPLEX W/ C & FOLIC ACID TAB 1 TABLET: TAB at 07:47

## 2024-05-17 RX ADMIN — Medication 1000 UNITS: at 07:47

## 2024-05-17 RX ADMIN — FLUOXETINE 40 MG: 20 CAPSULE ORAL at 07:47

## 2024-05-17 RX ADMIN — ANTIHEMOPHILIC FACTOR/VON WILLEBRAND FACTOR COMPLEX (HUMAN) 927 UNITS: KIT at 00:23

## 2024-05-17 RX ADMIN — PANTOPRAZOLE SODIUM 40 MG: 40 TABLET, DELAYED RELEASE ORAL at 05:53

## 2024-05-17 RX ADMIN — FLECAINIDE ACETATE 75 MG: 50 TABLET ORAL at 07:47

## 2024-05-17 RX ADMIN — METOPROLOL SUCCINATE 50 MG: 50 TABLET, EXTENDED RELEASE ORAL at 07:47

## 2024-05-17 RX ADMIN — ANTIHEMOPHILIC FACTOR/VON WILLEBRAND FACTOR COMPLEX (HUMAN) 927 UNITS: KIT at 11:48

## 2024-05-17 NOTE — ASSESSMENT & PLAN NOTE
Lab Results   Component Value Date    EGFR 50 05/17/2024    EGFR 49 05/16/2024    EGFR 56 02/14/2024    CREATININE 1.11 05/17/2024    CREATININE 1.13 05/16/2024    CREATININE 1.00 02/14/2024   Baseline creatinine appears 0.9-1  Avoid/limit nephrotoxins and hypotension

## 2024-05-17 NOTE — ASSESSMENT & PLAN NOTE
Known history of von Willebrand disease, will obtain CBC along with PT/INR and APTT  Patient follows with [facility] hematology Dr. Marcelo.  Per outpatient gastroenterologist discussion with him recommendation to provide Humate-P 1500 international unit 30-60 minutes prior to colonoscopy and Humate-P 900 units every 12 hours x2-3 with premedication of Solumedrol 40mg IV prior to first dose of Humate-P, additionally with Benadryl PRN.    Received humate P 1854 u prior to colonosocpy with Solumedrol, followed by 927 u BID x 3 doses  Outpatient hematology follow-up

## 2024-05-17 NOTE — PLAN OF CARE
Problem: PAIN - ADULT  Goal: Verbalizes/displays adequate comfort level or baseline comfort level  Description: Interventions:  - Encourage patient to monitor pain and request assistance  - Assess pain using appropriate pain scale  - Administer analgesics based on type and severity of pain and evaluate response  - Implement non-pharmacological measures as appropriate and evaluate response  - Consider cultural and social influences on pain and pain management  - Notify physician/advanced practitioner if interventions unsuccessful or patient reports new pain  Outcome: Progressing     Problem: INFECTION - ADULT  Goal: Absence or prevention of progression during hospitalization  Description: INTERVENTIONS:  - Assess and monitor for signs and symptoms of infection  - Monitor lab/diagnostic results  - Monitor all insertion sites, i.e. indwelling lines, tubes, and drains  - Monitor endotracheal if appropriate and nasal secretions for changes in amount and color  - [place] appropriate cooling/warming therapies per order  - Administer medications as ordered  - Instruct and encourage patient and family to use good hand hygiene technique  - Identify and instruct in appropriate isolation precautions for identified infection/condition  Outcome: Progressing  Goal: Absence of fever/infection during neutropenic period  Description: INTERVENTIONS:  - Monitor WBC    Outcome: Progressing     Problem: SAFETY ADULT  Goal: Patient will remain free of falls  Description: INTERVENTIONS:  - Educate patient/family on patient safety including physical limitations  - Instruct patient to call for assistance with activity   - Consult OT/PT to assist with strengthening/mobility   - Keep Call bell within reach  - Keep bed low and locked with side rails adjusted as appropriate  - Keep care items and personal belongings within reach  - Initiate and maintain comfort rounds    Outcome: Progressing  Goal: Maintain or return to baseline ADL  function  Description: INTERVENTIONS:  -  Assess patient's ability to carry out ADLs; assess patient's baseline for ADL function and identify physical deficits which impact ability to perform ADLs (bathing, care of mouth/teeth, toileting, grooming, dressing, etc.)  - Assess/evaluate cause of self-care deficits   - Assess range of motion  - Assess patient's mobility; develop plan if impaired  - Assess patient's need for assistive devices and provide as appropriate  - Encourage maximum independence but intervene and supervise when necessary  - Involve family in performance of ADLs  - Assess for home care needs following discharge   - Consider OT consult to assist with ADL evaluation and planning for discharge  - Provide patient education as appropriate  Outcome: Progressing  Goal: Maintains/Returns to pre admission functional level  Description: INTERVENTIONS:  - Perform AM-PAC 6 Click Basic Mobility/ Daily Activity assessment daily.  - Set and communicate daily mobility goal to care team and patient/family/caregiver.   - Collaborate with rehabilitation services on mobility goals if consulted  - Ambulate patient 3 times a day  - Out of bed for toileting  - Record patient progress and toleration of activity level   Outcome: Progressing     Problem: DISCHARGE PLANNING  Goal: Discharge to home or other facility with appropriate resources  Description: INTERVENTIONS:  - Identify barriers to discharge w/patient and caregiver  - Arrange for needed discharge resources and transportation as appropriate  - Identify discharge learning needs (meds, wound care, etc.)  - Arrange for interpretive services to assist at discharge as needed  - Refer to Case Management Department for coordinating discharge planning if the patient needs post-hospital services based on physician/advanced practitioner order or complex needs related to functional status, cognitive ability, or social support system  Outcome: Progressing     Problem:  Knowledge Deficit  Goal: Patient/family/caregiver demonstrates understanding of disease process, treatment plan, medications, and discharge instructions  Description: Complete learning assessment and assess knowledge base.  Interventions:  - Provide teaching at level of understanding  - Provide teaching via preferred learning methods  Outcome: Progressing

## 2024-05-17 NOTE — ASSESSMENT & PLAN NOTE
Follows with [facility] hematology, receives IV Venofer as outpatient.    Appears baseline hemoglobin 8-10  Hemoglobin stable at discharge

## 2024-05-17 NOTE — DISCHARGE SUMMARY
[facility]  Discharge- Ari Parsons 1950, 73 y.o. female MRN: [id number]  Unit/Bed#: [facility] 815-01 Encounter: [id number]  Primary Care Provider: Donovan Tolentino MD   Date and time admitted to hospital: 5/15/2024 11:28 AM    Electrolyte abnormality  Assessment & Plan  K 3.4 and Mg 1.7  Repleted   Resolved    Hematochezia  Assessment & Plan  Patient noting longstanding history of intermittent hematochezia initially suspected from external hemorrhoids, now with increased frequency of the hematochezia now following with GI.  Reportedly with prior history of microscopic colitis noted on colon biopsy after colonoscopy 2020 however has never received full treatment for this  EGD/colonoscopy on 5/16.  Follow-up biopsies.  Pre treatment with Humane P in the setting of vWD ordered  Outpatient GI follow-up    Stage 3 chronic kidney disease, unspecified whether stage 3a or 3b CKD ([facility])  Assessment & Plan  Lab Results   Component Value Date    EGFR 50 05/17/2024    EGFR 49 05/16/2024    EGFR 56 02/14/2024    CREATININE 1.11 05/17/2024    CREATININE 1.13 05/16/2024    CREATININE 1.00 02/14/2024   Baseline creatinine appears 0.9-1  Avoid/limit nephrotoxins and hypotension    Anemia  Assessment & Plan  Follows with [facility] hematology, receives IV Venofer as outpatient.    Appears baseline hemoglobin 8-10  Hemoglobin stable at discharge    Anxiety disorder  Assessment & Plan  Mood stable, continue home dose fluoxetine 40 mg daily and oral lorazepam 0.5 mg every 6 hours as needed    Asthma  Assessment & Plan  Not in acute exacerbation  On Qvar twice daily at home, continue  Continue as needed albuterol    Paroxysmal atrial fibrillation (HCC)  Assessment & Plan  Continue metoprolol succinate 50 mg daily and flecainide 75 mg twice daily  Currently normal sinus rhythm  Not on anticoagulation in the setting of hematochezia    * Von Willebrand disease (HCC)  Assessment & Plan  Known history of  von Willebrand disease, will obtain CBC along with PT/INR and APTT  Patient follows with [facility] hematology Dr. Marcelo.  Per outpatient gastroenterologist discussion with him recommendation to provide Humate-P 1500 international unit 30-60 minutes prior to colonoscopy and Humate-P 900 units every 12 hours x2-3 with premedication of Solumedrol 40mg IV prior to first dose of Humate-P, additionally with Benadryl PRN.    Received humate P 1854 u prior to colonosocpy with Solumedrol, followed by 927 u BID x 3 doses  Outpatient hematology follow-up        Medical Problems       Resolved Problems  Date Reviewed: 1/23/2024   None       Discharging Physician / Practitioner: Leda Rosa MD  PCP: Donovan Tolentino MD  Admission Date:   Admission Orders (From admission, onward)       Ordered        05/15/24 1143  INPATIENT ADMISSION  Once                          Discharge Date: 05/17/24    Consultations During Hospital Stay:  GI    Procedures Performed:   EGD/colonoscopy    Significant Findings / Test Results:   None    Incidental Findings:   None       Test Results Pending at Discharge (will require follow up):   None      Outpatient Tests Requested:  None     Complications:  None     Reason for Admission: Bowel prep    Hospital Course:   Ari Parsons is a 73 y.o. female patient who originally presented to the hospital on 5/15/2024 for bowel prep and premedication prior to GI intervention in the setting of von Willebrand disease.  Patient received Humate-P as per hematology recommendation.  She underwent EGD and colonoscopy on 5/16 with biopsies obtained.  Hemoglobin remained stable and patient was discharged with GI outpatient follow-up      Please see above list of diagnoses and related plan for additional information.     Condition at Discharge: stable    Discharge Day Visit / Exam:   Subjective: No events overnight.  Patient reports feeling well this morning.  No complications.  Tolerating oral intake.  Vitals:  "Blood Pressure: 116/59 (05/17/24 0745)  Pulse: 75 (05/17/24 0745)  Temperature: 98.2 °F (36.8 °C) (05/17/24 0745)  Respirations: 18 (05/17/24 0745)  Height: 5' 5\" (165.1 cm) (05/16/24 1900)  Weight - Scale: 62.1 kg (137 lb) (05/16/24 1900)  SpO2: 100 % (05/17/24 0745)  Exam:   Physical Exam  Vitals and nursing note reviewed.   Constitutional:       General: She is not in acute distress.     Appearance: She is well-developed.   HENT:      Head: Normocephalic and atraumatic.   Eyes:      Conjunctiva/sclera: Conjunctivae normal.   Cardiovascular:      Rate and Rhythm: Normal rate and regular rhythm.   Pulmonary:      Effort: No respiratory distress.   Musculoskeletal:         General: No swelling.      Cervical back: Neck supple.   Skin:     General: Skin is warm and dry.   Neurological:      Mental Status: She is alert.          Discussion with Family: Updated  () at bedside.    Discharge instructions/Information to patient and family:   See after visit summary for information provided to patient and family.      Provisions for Follow-Up Care:  See after visit summary for information related to follow-up care and any pertinent home health orders.      Mobility at time of Discharge:   Basic Mobility Inpatient Raw Score: 24  JH-HLM Goal: 8: Walk 250 feet or more  JH-HLM Achieved: 8: Walk 250 feet ot more  HLM Goal achieved. Continue to encourage appropriate mobility.     Disposition:   Home    Planned Readmission: no     Discharge Statement:  I spent 35 minutes discharging the patient. This time was spent on the day of discharge. I had direct contact with the patient on the day of discharge. Greater than 50% of the total time was spent examining patient, answering all patient questions, arranging and discussing plan of care with patient as well as directly providing post-discharge instructions.  Additional time then spent on discharge activities.    Discharge Medications:  See after visit summary " for reconciled discharge medications provided to patient and/or family.      **Please Note: This note may have been constructed using a voice recognition system**

## 2024-05-17 NOTE — ASSESSMENT & PLAN NOTE
Patient noting longstanding history of intermittent hematochezia initially suspected from external hemorrhoids, now with increased frequency of the hematochezia now following with GI.  Reportedly with prior history of microscopic colitis noted on colon biopsy after colonoscopy 2020 however has never received full treatment for this  EGD/colonoscopy on 5/16.  Follow-up biopsies.  Pre treatment with Humane P in the setting of vWD ordered  Outpatient GI follow-up   Regular rate and rhythm, Heart sounds S1 S2 present, no murmurs, rubs or gallops

## 2024-05-17 NOTE — PLAN OF CARE
Problem: PAIN - ADULT  Goal: Verbalizes/displays adequate comfort level or baseline comfort level  Description: Interventions:  - Encourage patient to monitor pain and request assistance  - Assess pain using appropriate pain scale  - Administer analgesics based on type and severity of pain and evaluate response  - Implement non-pharmacological measures as appropriate and evaluate response  - Consider cultural and social influences on pain and pain management  - Notify physician/advanced practitioner if interventions unsuccessful or patient reports new pain  Outcome: Progressing     Problem: INFECTION - ADULT  Goal: Absence or prevention of progression during hospitalization  Description: INTERVENTIONS:  - Assess and monitor for signs and symptoms of infection  - Monitor lab/diagnostic results  - Monitor all insertion sites, i.e. indwelling lines, tubes, and drains  - Monitor endotracheal if appropriate and nasal secretions for changes in amount and color  - McEwen appropriate cooling/warming therapies per order  - Administer medications as ordered  - Instruct and encourage patient and family to use good hand hygiene technique  - Identify and instruct in appropriate isolation precautions for identified infection/condition  Outcome: Progressing  Goal: Absence of fever/infection during neutropenic period  Description: INTERVENTIONS:  - Monitor WBC    Outcome: Progressing     Problem: DISCHARGE PLANNING  Goal: Discharge to home or other facility with appropriate resources  Description: INTERVENTIONS:  - Identify barriers to discharge w/patient and caregiver  - Arrange for needed discharge resources and transportation as appropriate  - Identify discharge learning needs (meds, wound care, etc.)  - Arrange for interpretive services to assist at discharge as needed  - Refer to Case Management Department for coordinating discharge planning if the patient needs post-hospital services based on physician/advanced  practitioner order or complex needs related to functional status, cognitive ability, or social support system  Outcome: Progressing

## 2024-05-20 PROCEDURE — 88342 IMHCHEM/IMCYTCHM 1ST ANTB: CPT | Performed by: PATHOLOGY

## 2024-05-20 PROCEDURE — 88313 SPECIAL STAINS GROUP 2: CPT | Performed by: PATHOLOGY

## 2024-05-20 PROCEDURE — 88305 TISSUE EXAM BY PATHOLOGIST: CPT | Performed by: PATHOLOGY

## 2024-05-20 PROCEDURE — 88360 TUMOR IMMUNOHISTOCHEM/MANUAL: CPT | Performed by: PATHOLOGY

## 2024-05-20 PROCEDURE — 88341 IMHCHEM/IMCYTCHM EA ADD ANTB: CPT | Performed by: PATHOLOGY

## 2024-05-24 ENCOUNTER — TELEPHONE (OUTPATIENT)
Age: 74
End: 2024-05-24

## 2024-05-24 NOTE — TELEPHONE ENCOUNTER
Patients GI provider:  BRIDGET Yousif    Number to return call: [phone]    Reason for call: Pt calling for results of EGD/colon    Scheduled procedure/appointment date if applicable: Apt 9/26/2024

## 2024-06-06 DIAGNOSIS — K52.831 COLLAGENOUS COLITIS: Primary | ICD-10-CM

## 2024-06-06 DIAGNOSIS — K64.8 OTHER HEMORRHOIDS: Primary | ICD-10-CM

## 2024-06-06 DIAGNOSIS — D68.00 VON WILLEBRAND DISEASE (HCC): ICD-10-CM

## 2024-06-06 DIAGNOSIS — K52.839 MICROSCOPIC COLITIS, UNSPECIFIED MICROSCOPIC COLITIS TYPE: Primary | ICD-10-CM

## 2024-06-06 RX ORDER — BUDESONIDE 9 MG/1
9 TABLET, FILM COATED, EXTENDED RELEASE ORAL DAILY
Qty: 56 TABLET | Refills: 0 | Status: SHIPPED | OUTPATIENT
Start: 2024-06-06 | End: 2024-06-06

## 2024-06-06 RX ORDER — BUDESONIDE 3 MG/1
CAPSULE, COATED PELLETS ORAL
Qty: 126 CAPSULE | Refills: 0 | Status: SHIPPED | OUTPATIENT
Start: 2024-06-06 | End: 2024-08-01

## 2024-07-23 ENCOUNTER — OFFICE VISIT (OUTPATIENT)
Dept: CARDIOLOGY CLINIC | Facility: CLINIC | Age: 74
End: 2024-07-23
Payer: COMMERCIAL

## 2024-07-23 VITALS
DIASTOLIC BLOOD PRESSURE: 54 MMHG | SYSTOLIC BLOOD PRESSURE: 120 MMHG | OXYGEN SATURATION: 98 % | BODY MASS INDEX: 23.06 KG/M2 | HEART RATE: 60 BPM | HEIGHT: 65 IN | WEIGHT: 138.4 LBS

## 2024-07-23 DIAGNOSIS — I48.0 PAROXYSMAL ATRIAL FIBRILLATION (HCC): Primary | ICD-10-CM

## 2024-07-23 DIAGNOSIS — I10 ESSENTIAL HYPERTENSION: ICD-10-CM

## 2024-07-23 DIAGNOSIS — R01.1 HEART MURMUR: ICD-10-CM

## 2024-07-23 DIAGNOSIS — F41.9 ANXIETY DISORDER, UNSPECIFIED TYPE: ICD-10-CM

## 2024-07-23 PROCEDURE — 99213 OFFICE O/P EST LOW 20 MIN: CPT | Performed by: INTERNAL MEDICINE

## 2024-07-23 RX ORDER — CALCIUM CARBONATE 300MG(750)
TABLET,CHEWABLE ORAL
COMMUNITY

## 2024-07-23 RX ORDER — POTASSIUM CHLORIDE 750 MG/1
20 CAPSULE, EXTENDED RELEASE ORAL 2 TIMES DAILY
COMMUNITY

## 2024-07-23 NOTE — PATIENT INSTRUCTIONS
The patient will be scheduled for echocardiogram and regular exercise stress test.  She will continue present medications.

## 2024-07-23 NOTE — ASSESSMENT & PLAN NOTE
History of paroxysmal atrial fibrillation, stable.  The patient is status post ablation therapy.  She is in regular rhythm.  We will continue present regimen.

## 2024-07-23 NOTE — PROGRESS NOTES
Subjective:        Patient ID: Ari Parsons is a 74 y.o. female.    Chief Complaint:  The patient presented to this office for the purpose of cardiac follow-up.  She is known to have a history of hypertension and paroxysmal atrial fibrillation status post ablation therapy.  The patient is known to have anemia with clinical diagnosis of microcytic colitis.  The patient is concerned with family history of relatively early coronary disease.  She denies any symptoms of chest pain or shortness of breath.  She denies any symptoms of palpitation, dizziness or syncope.  She has no leg edema.      The following portions of the patient's history were reviewed and updated as appropriate: allergies, current medications, past family history, past medical history, past social history, past surgical history, and problem list.  Review of Systems   Constitutional: Negative.   Cardiovascular: Negative.    Respiratory: Negative.     Psychiatric/Behavioral: Negative.            Objective:     Physical Exam  Constitutional:       Appearance: Normal appearance.   HENT:      Head: Normocephalic and atraumatic.   Cardiovascular:      Rate and Rhythm: Regular rhythm.      Heart sounds: Murmur heard.      Systolic murmur is present with a grade of 2/6.   Pulmonary:      Effort: Pulmonary effort is normal.      Breath sounds: Normal breath sounds.   Musculoskeletal:      Right lower leg: No edema.      Left lower leg: No edema.   Skin:     General: Skin is warm and dry.   Neurological:      Mental Status: She is alert and oriented to person, place, and time.   Psychiatric:         Mood and Affect: Mood normal.         Behavior: Behavior normal.         Lab Review:   Lab Results   Component Value Date    K 4.1 05/17/2024     05/17/2024    CO2 24 05/17/2024    BUN 16 05/17/2024    CREATININE 1.11 05/17/2024    CALCIUM 8.4 05/17/2024         Assessment:       1. Paroxysmal atrial fibrillation (HCC)  Stress test only, exercise    Echo  complete w/ contrast if indicated      2. Essential hypertension  Stress test only, exercise    Echo complete w/ contrast if indicated      3. Anxiety disorder, unspecified type        4. Heart murmur  Stress test only, exercise    Echo complete w/ contrast if indicated           Plan:       The patient will be scheduled for echocardiogram and regular exercise stress test.  She will continue present medications.

## 2024-08-22 ENCOUNTER — TELEPHONE (OUTPATIENT)
Dept: HEMATOLOGY ONCOLOGY | Facility: CLINIC | Age: 74
End: 2024-08-22

## 2024-09-23 DIAGNOSIS — I48.0 PAROXYSMAL ATRIAL FIBRILLATION (HCC): ICD-10-CM

## 2024-09-23 DIAGNOSIS — I10 ESSENTIAL HYPERTENSION: ICD-10-CM

## 2024-09-24 ENCOUNTER — HOSPITAL ENCOUNTER (OUTPATIENT)
Dept: NON INVASIVE DIAGNOSTICS | Facility: CLINIC | Age: 74
Discharge: HOME | End: 2024-09-24
Payer: COMMERCIAL

## 2024-09-24 VITALS
SYSTOLIC BLOOD PRESSURE: 120 MMHG | BODY MASS INDEX: 22.99 KG/M2 | DIASTOLIC BLOOD PRESSURE: 54 MMHG | HEIGHT: 65 IN | WEIGHT: 138 LBS | HEART RATE: 60 BPM

## 2024-09-24 DIAGNOSIS — I10 ESSENTIAL HYPERTENSION: ICD-10-CM

## 2024-09-24 DIAGNOSIS — R01.1 HEART MURMUR: ICD-10-CM

## 2024-09-24 DIAGNOSIS — I48.0 PAROXYSMAL ATRIAL FIBRILLATION (HCC): ICD-10-CM

## 2024-09-24 LAB
AORTIC ROOT: 2.4 CM
ASCENDING AORTA: 2.8 CM
AV LVOT MEAN GRADIENT: 3 MMHG
AV LVOT PEAK GRADIENT: 6 MMHG
AV REGURGITATION PRESSURE HALF TIME: 606 MS
BSA FOR ECHO PROCEDURE: 1.69 M2
DOP CALC LVOT PEAK VEL VTI: 27.06 CM
DOP CALC LVOT PEAK VEL: 1.18 M/S
E WAVE DECELERATION TIME: 172 MS
E/A RATIO: 1.9
FRACTIONAL SHORTENING: 24 (ref 28–44)
INTERVENTRICULAR SEPTUM IN DIASTOLE (PARASTERNAL SHORT AXIS VIEW): 0.8 CM
INTERVENTRICULAR SEPTUM: 0.8 CM (ref 0.6–1.1)
LAAS-AP2: 19.4 CM2
LAAS-AP4: 19.2 CM2
LEFT ATRIUM SIZE: 4 CM
LEFT ATRIUM VOLUME (MOD BIPLANE): 56 ML
LEFT ATRIUM VOLUME INDEX (MOD BIPLANE): 33.1 ML/M2
LEFT INTERNAL DIMENSION IN SYSTOLE: 3.5 CM (ref 2.1–4)
LEFT VENTRICULAR INTERNAL DIMENSION IN DIASTOLE: 4.6 CM (ref 3.5–6)
LEFT VENTRICULAR POSTERIOR WALL IN END DIASTOLE: 1.4 CM
LEFT VENTRICULAR STROKE VOLUME: 49 ML
LVSV (TEICH): 49 ML
MAX HR PERCENT: 60 %
MAX HR: 90 BPM
MV E'TISSUE VEL-SEP: 7 CM/S
MV PEAK A VEL: 0.77 M/S
MV PEAK E VEL: 146 CM/S
MV STENOSIS PRESSURE HALF TIME: 50 MS
MV VALVE AREA P 1/2 METHOD: 4.4
RA PRESSURE ESTIMATED: 3 MMHG
RATE PRESSURE PRODUCT: NORMAL
RIGHT ATRIUM AREA SYSTOLE A4C: 16.5 CM2
RIGHT VENTRICLE ID DIMENSION: 2.9 CM
RV PSP: 33 MMHG
SL CV AV DECELERATION TIME RETROGRADE: 2089 MS
SL CV AV PEAK GRADIENT RETROGRADE: 67 MMHG
SL CV LEFT ATRIUM LENGTH A2C: 5.5 CM
SL CV LV EF: 60
SL CV PED ECHO LEFT VENTRICLE DIASTOLIC VOLUME (MOD BIPLANE) 2D: 99 ML
SL CV PED ECHO LEFT VENTRICLE SYSTOLIC VOLUME (MOD BIPLANE) 2D: 50 ML
SL CV STRESS RECOVERY BP: NORMAL MMHG
SL CV STRESS RECOVERY HR: 64 BPM
SL CV STRESS RECOVERY O2 SAT: 99 %
STRESS ANGINA INDEX: 0
STRESS BASELINE BP: NORMAL MMHG
STRESS BASELINE HR: 54 BPM
STRESS O2 SAT REST: 100 %
STRESS PEAK HR: 90 BPM
STRESS POST ESTIMATED WORKLOAD: 4 METS
STRESS POST EXERCISE DUR MIN: 1 MIN
STRESS POST EXERCISE DUR SEC: 39 SEC
STRESS POST O2 SAT PEAK: 97 %
STRESS POST PEAK BP: 114 MMHG
TR MAX PG: 30 MMHG
TR PEAK VELOCITY: 2.8 M/S
TRICUSPID ANNULAR PLANE SYSTOLIC EXCURSION: 1.7 CM
TRICUSPID VALVE PEAK REGURGITATION VELOCITY: 2.77 M/S

## 2024-09-24 PROCEDURE — 93306 TTE W/DOPPLER COMPLETE: CPT | Performed by: INTERNAL MEDICINE

## 2024-09-24 PROCEDURE — 93018 CV STRESS TEST I&R ONLY: CPT | Performed by: INTERNAL MEDICINE

## 2024-09-24 PROCEDURE — 93306 TTE W/DOPPLER COMPLETE: CPT

## 2024-09-24 PROCEDURE — 93017 CV STRESS TEST TRACING ONLY: CPT

## 2024-09-24 PROCEDURE — 93016 CV STRESS TEST SUPVJ ONLY: CPT | Performed by: INTERNAL MEDICINE

## 2024-09-24 RX ORDER — FLECAINIDE ACETATE 50 MG/1
TABLET ORAL
Qty: 270 TABLET | Refills: 3 | Status: SHIPPED | OUTPATIENT
Start: 2024-09-24

## 2024-09-24 RX ORDER — METOPROLOL SUCCINATE 50 MG/1
50 TABLET, EXTENDED RELEASE ORAL DAILY
Qty: 90 TABLET | Refills: 1 | Status: SHIPPED | OUTPATIENT
Start: 2024-09-24

## 2024-09-24 RX ORDER — LOSARTAN POTASSIUM 100 MG/1
100 TABLET ORAL DAILY
Qty: 90 TABLET | Refills: 1 | Status: SHIPPED | OUTPATIENT
Start: 2024-09-24

## 2024-09-25 ENCOUNTER — APPOINTMENT (OUTPATIENT)
Dept: LAB | Facility: CLINIC | Age: 74
End: 2024-09-25
Payer: COMMERCIAL

## 2024-09-25 DIAGNOSIS — K92.1 HEMATOCHEZIA: ICD-10-CM

## 2024-09-25 DIAGNOSIS — D52.9 ANEMIA DUE TO FOLIC ACID DEFICIENCY, UNSPECIFIED DEFICIENCY TYPE: ICD-10-CM

## 2024-09-25 DIAGNOSIS — D50.0 IRON DEFICIENCY ANEMIA DUE TO CHRONIC BLOOD LOSS: ICD-10-CM

## 2024-09-25 DIAGNOSIS — N18.30 STAGE 3 CHRONIC KIDNEY DISEASE, UNSPECIFIED WHETHER STAGE 3A OR 3B CKD (HCC): ICD-10-CM

## 2024-09-25 DIAGNOSIS — D64.9 ANEMIA, UNSPECIFIED TYPE: ICD-10-CM

## 2024-09-25 DIAGNOSIS — E53.8 VITAMIN B12 DEFICIENCY: ICD-10-CM

## 2024-09-25 DIAGNOSIS — D68.00 VON WILLEBRAND DISEASE (HCC): ICD-10-CM

## 2024-09-25 DIAGNOSIS — I10 ESSENTIAL HYPERTENSION: ICD-10-CM

## 2024-09-25 DIAGNOSIS — R53.83 LETHARGY: ICD-10-CM

## 2024-09-25 LAB
ALBUMIN SERPL BCG-MCNC: 3.7 G/DL (ref 3.5–5)
ALP SERPL-CCNC: 54 U/L (ref 34–104)
ALT SERPL W P-5'-P-CCNC: 9 U/L (ref 7–52)
ANION GAP SERPL CALCULATED.3IONS-SCNC: 8 MMOL/L (ref 4–13)
AST SERPL W P-5'-P-CCNC: 14 U/L (ref 13–39)
BASOPHILS # BLD AUTO: 0.06 THOUSANDS/ΜL (ref 0–0.1)
BASOPHILS NFR BLD AUTO: 1 % (ref 0–1)
BILIRUB SERPL-MCNC: 0.28 MG/DL (ref 0.2–1)
BUN SERPL-MCNC: 18 MG/DL (ref 5–25)
CALCIUM SERPL-MCNC: 8.5 MG/DL (ref 8.4–10.2)
CHLORIDE SERPL-SCNC: 106 MMOL/L (ref 96–108)
CO2 SERPL-SCNC: 22 MMOL/L (ref 21–32)
CREAT SERPL-MCNC: 0.92 MG/DL (ref 0.6–1.3)
EOSINOPHIL # BLD AUTO: 0.13 THOUSAND/ΜL (ref 0–0.61)
EOSINOPHIL NFR BLD AUTO: 2 % (ref 0–6)
ERYTHROCYTE [DISTWIDTH] IN BLOOD BY AUTOMATED COUNT: 19.1 % (ref 11.6–15.1)
FERRITIN SERPL-MCNC: 4 NG/ML (ref 11–307)
FOLATE SERPL-MCNC: >22.3 NG/ML
GFR SERPL CREATININE-BSD FRML MDRD: 62 ML/MIN/1.73SQ M
GLUCOSE P FAST SERPL-MCNC: 90 MG/DL (ref 65–99)
HCT VFR BLD AUTO: 22.5 % (ref 34.8–46.1)
HGB BLD-MCNC: 6.8 G/DL (ref 11.5–15.4)
IMM GRANULOCYTES # BLD AUTO: 0.02 THOUSAND/UL (ref 0–0.2)
IMM GRANULOCYTES NFR BLD AUTO: 0 % (ref 0–2)
IRON SERPL-MCNC: <10 UG/DL (ref 50–212)
LYMPHOCYTES # BLD AUTO: 0.82 THOUSANDS/ΜL (ref 0.6–4.47)
LYMPHOCYTES NFR BLD AUTO: 12 % (ref 14–44)
MCH RBC QN AUTO: 27 PG (ref 26.8–34.3)
MCHC RBC AUTO-ENTMCNC: 30.2 G/DL (ref 31.4–37.4)
MCV RBC AUTO: 89 FL (ref 82–98)
MONOCYTES # BLD AUTO: 0.85 THOUSAND/ΜL (ref 0.17–1.22)
MONOCYTES NFR BLD AUTO: 13 % (ref 4–12)
NEUTROPHILS # BLD AUTO: 4.74 THOUSANDS/ΜL (ref 1.85–7.62)
NEUTS SEG NFR BLD AUTO: 72 % (ref 43–75)
NRBC BLD AUTO-RTO: 0 /100 WBCS
PLATELET # BLD AUTO: 377 THOUSANDS/UL (ref 149–390)
PMV BLD AUTO: 9.6 FL (ref 8.9–12.7)
POTASSIUM SERPL-SCNC: 4.5 MMOL/L (ref 3.5–5.3)
PROT SERPL-MCNC: 5.9 G/DL (ref 6.4–8.4)
RBC # BLD AUTO: 2.52 MILLION/UL (ref 3.81–5.12)
SODIUM SERPL-SCNC: 136 MMOL/L (ref 135–147)
TSH SERPL DL<=0.05 MIU/L-ACNC: 2.77 UIU/ML (ref 0.45–4.5)
UIBC SERPL-MCNC: 880 UG/DL (ref 155–355)
WBC # BLD AUTO: 6.62 THOUSAND/UL (ref 4.31–10.16)

## 2024-09-25 PROCEDURE — 83540 ASSAY OF IRON: CPT

## 2024-09-25 PROCEDURE — 82728 ASSAY OF FERRITIN: CPT

## 2024-09-25 PROCEDURE — 36415 COLL VENOUS BLD VENIPUNCTURE: CPT

## 2024-09-25 PROCEDURE — 83918 ORGANIC ACIDS TOTAL QUANT: CPT

## 2024-09-25 PROCEDURE — 82746 ASSAY OF FOLIC ACID SERUM: CPT

## 2024-09-25 PROCEDURE — 80053 COMPREHEN METABOLIC PANEL: CPT

## 2024-09-25 PROCEDURE — 85025 COMPLETE CBC W/AUTO DIFF WBC: CPT

## 2024-09-25 PROCEDURE — 84443 ASSAY THYROID STIM HORMONE: CPT

## 2024-09-25 PROCEDURE — 83550 IRON BINDING TEST: CPT

## 2024-09-26 ENCOUNTER — TELEPHONE (OUTPATIENT)
Dept: HEMATOLOGY ONCOLOGY | Facility: CLINIC | Age: 74
End: 2024-09-26

## 2024-09-26 ENCOUNTER — TELEPHONE (OUTPATIENT)
Age: 74
End: 2024-09-26

## 2024-09-26 DIAGNOSIS — D50.0 IRON DEFICIENCY ANEMIA DUE TO CHRONIC BLOOD LOSS: Primary | ICD-10-CM

## 2024-09-26 PROBLEM — D50.9 IRON DEFICIENCY ANEMIA, UNSPECIFIED: Status: ACTIVE | Noted: 2024-09-26

## 2024-09-26 RX ORDER — SODIUM CHLORIDE 9 MG/ML
20 INJECTION, SOLUTION INTRAVENOUS ONCE
Status: CANCELLED | OUTPATIENT
Start: 2024-09-27

## 2024-09-26 NOTE — TELEPHONE ENCOUNTER
Called and spoke to patient. Dr. Marcelo reviewed labs and recommends patient get treated with iron infusions starting as soon as possible and to come in for an office visit. Patient prefers [facility], so we will try to get her scheduled asap and let her know. Informed patient if symptoms worsen, she should go to the ER. Patient agreeable to plan and verbalized understanding.    AN Clerical: Please schedule office visit with Dr. Marcelo

## 2024-09-26 NOTE — TELEPHONE ENCOUNTER
Spoke with patient. Scheduled for iron infusion tomorrow at 8:30 am at the [facility]    [facility]: Can you please schedule remainder of iron treatments

## 2024-09-27 ENCOUNTER — HOSPITAL ENCOUNTER (OUTPATIENT)
Dept: INFUSION CENTER | Facility: CLINIC | Age: 74
End: 2024-09-27
Payer: COMMERCIAL

## 2024-09-27 ENCOUNTER — HOSPITAL ENCOUNTER (EMERGENCY)
Facility: HOSPITAL | Age: 74
Discharge: HOME | End: 2024-09-27
Attending: EMERGENCY MEDICINE | Admitting: EMERGENCY MEDICINE
Payer: COMMERCIAL

## 2024-09-27 VITALS
WEIGHT: 138.89 LBS | RESPIRATION RATE: 18 BRPM | DIASTOLIC BLOOD PRESSURE: 55 MMHG | HEART RATE: 66 BPM | BODY MASS INDEX: 23.11 KG/M2 | SYSTOLIC BLOOD PRESSURE: 108 MMHG | OXYGEN SATURATION: 100 % | TEMPERATURE: 98.1 F

## 2024-09-27 VITALS
OXYGEN SATURATION: 100 % | RESPIRATION RATE: 18 BRPM | SYSTOLIC BLOOD PRESSURE: 103 MMHG | TEMPERATURE: 96.6 F | HEART RATE: 55 BPM | DIASTOLIC BLOOD PRESSURE: 49 MMHG

## 2024-09-27 DIAGNOSIS — D64.9 SYMPTOMATIC ANEMIA: Primary | ICD-10-CM

## 2024-09-27 DIAGNOSIS — D50.0 IRON DEFICIENCY ANEMIA DUE TO CHRONIC BLOOD LOSS: ICD-10-CM

## 2024-09-27 DIAGNOSIS — D50.0 IRON DEFICIENCY ANEMIA DUE TO CHRONIC BLOOD LOSS: Primary | ICD-10-CM

## 2024-09-27 DIAGNOSIS — F41.1 ANXIETY STATE: ICD-10-CM

## 2024-09-27 LAB
CHEST PAIN STATEMENT: NORMAL
MAX DIASTOLIC BP: 64 MMHG
MAX PREDICTED HEART RATE: 148 BPM
PROTOCOL NAME: NORMAL
REASON FOR TERMINATION: NORMAL
STRESS POST EXERCISE DUR MIN: 1 MIN
STRESS POST EXERCISE DUR SEC: 39 SEC
STRESS POST PEAK HR: 90 BPM
STRESS POST PEAK SYSTOLIC BP: 142 MMHG
TARGET HR FORMULA: NORMAL
TEST INDICATION: NORMAL

## 2024-09-27 PROCEDURE — 99284 EMERGENCY DEPT VISIT MOD MDM: CPT | Performed by: EMERGENCY MEDICINE

## 2024-09-27 PROCEDURE — 96366 THER/PROPH/DIAG IV INF ADDON: CPT

## 2024-09-27 PROCEDURE — 99284 EMERGENCY DEPT VISIT MOD MDM: CPT

## 2024-09-27 PROCEDURE — 96365 THER/PROPH/DIAG IV INF INIT: CPT

## 2024-09-27 RX ORDER — SODIUM CHLORIDE 9 MG/ML
20 INJECTION, SOLUTION INTRAVENOUS ONCE
OUTPATIENT
Start: 2024-10-04

## 2024-09-27 RX ORDER — SODIUM CHLORIDE 9 MG/ML
20 INJECTION, SOLUTION INTRAVENOUS ONCE
Status: COMPLETED | OUTPATIENT
Start: 2024-09-27 | End: 2024-09-27

## 2024-09-27 RX ADMIN — SODIUM CHLORIDE 20 ML/HR: 0.9 INJECTION, SOLUTION INTRAVENOUS at 08:47

## 2024-09-27 RX ADMIN — IRON SUCROSE 300 MG: 20 INJECTION, SOLUTION INTRAVENOUS at 08:53

## 2024-09-27 NOTE — PROGRESS NOTES
Patient here for venofer dosing and is well, no c/o or changes to report, venofer infusing as ordered.

## 2024-09-27 NOTE — PATIENT INSTRUCTIONS
September 2024 Sunday Monday Tuesday Wednesday Thursday Friday Saturday   1     2     3     4     5     6     7                8     9     10     11     12     13     14                15     16     17     18     19     20     21                22     23     24    ECHO COMPLETE   2:00 PM   (60 min.)   AN HV ECHO 2   [facility]    STRESS TEST ONLY   3:00 PM   (60 min.)   AN HV STRESS 1   [facility] 25    LAB WALK IN  10:20 AM   (5 min.)   BE 8THAVE CHAIR   [facility] [address] 26     27    INF THERAPY PLAN   8:30 AM   (150 min.)   AN INF CHAIR 9   [facility] 28            Cycle 1, Treatment 1    29     30                                                Treatment Details         9/27/2024 - Cycle 1, Treatment 1      Supportive Care: APPT 17, ONCBCN NURSE [id number], sodium chloride, iron sucrose (VENOFER), MONITOR KEMAR, ONCBCN NURSE COMMUNICATION7        October 2024 Sunday Monday Tuesday Wednesday Thursday Friday Saturday             1     2     3    INF THERAPY PLAN   3:30 PM   (150 min.)   AN INF CHAIR 10   [facility] 4     5            Cycle 1, Treatment 2    6     7     8     9    OFFICE VISIT SHORT PG  10:25 AM   (20 min.)   Arminda Marcelo MD   [facility] [place] 10    INF THERAPY PLAN   3:00 PM   (150 min.)   AN INF CHAIR 9   [facility] 11     12            Cycle 1, Treatment 3    13     14     15     16     17     18    INF THERAPY PLAN   3:30 PM   (150 min.)   AN INF CHAIR 12   [facility] 19            Cycle 1, Treatment 4    20     21     22     23     24     25     26                27     28     29     30     31                                 Treatment Details         10/4/2024 - Cycle 1, Treatment 2      Supportive Care:  APPT 17    10/11/2024 - Cycle 1, Treatment 3      Supportive Care: APPT 17    10/18/2024 - Cycle 1, Treatment 4      Supportive Care: APPT 17

## 2024-09-27 NOTE — ED PROVIDER NOTES
Final diagnoses:   Symptomatic anemia   Anxiety state     ED Disposition       ED Disposition   Discharge    Condition   Stable    Date/Time   Fri Sep 27, 2024  4:11 PM    Comment   Ari Parsons discharge to home/self care.                   Assessment & Plan       Medical Decision Making        Initial ED assessment:   74-year-old female, anxious about multiple things, had an episode of dark-colored urine after receiving iron transfusion although this is now cleared, was having a pulse ox reading at home that was bouncing between 70s and 100 ultimately settled on the 100 is 100% here, states to me that she regrets coming to the emergency department and feels well now and just wants to go home    Pathology at risk for includes but is not limited to:   Likely anxiety induced reaction, did explain to her cannot fully rule out worsening anemia, renal pathology, or any other pathology as she does not want any testing.  She does have symptomatic anemia consider worsening anemia    Initial ED plan:   Went through risk benefits and alternatives of blood work, EKGs, etc., she does not want any of this really just wants to be discharged.  Did discuss blood transfusion she is currently getting iron transfusions does not want a blood transfusion.        Final ED summary/disposition:   After evaluation and workup in the emergency department, patient ultimately discharged will follow with her outpatient providers.             Medications - No data to display    ED Risk Strat Scores                           SBIRT 20yo+      Flowsheet Row Most Recent Value   Initial Alcohol Screen: US AUDIT-C     1. How often do you have a drink containing alcohol? 6 Filed at: 09/27/2024 9652   2. How many drinks containing alcohol do you have on a typical day you are drinking?  2 Filed at: 09/27/2024 2156   3b. FEMALE Any Age, or MALE 65+: How often do you have 4 or more drinks on one occassion? 0 Filed at: 09/27/2024 3893   Audit-C Score 8  "Filed at: 2024 1540   MONIQUE: How many times in the past year have you...    Used an illegal drug or used a prescription medication for non-medical reasons? Never Filed at: 2024 4537                            History of Present Illness       Chief Complaint   Patient presents with    Hand Swelling    Dizziness     Pt reports concern related to iron infusion today and subsequent hand swelling, dizziness and felling \"foggy\".       Past Medical History:   Diagnosis Date    Anxiety     Asthma     Atrial fibrillation (HCC)     Bowel obstruction (HCC)     Hypertension     PAF (paroxysmal atrial fibrillation) (HCC)     Von Willebrand disease (HCC)       Past Surgical History:   Procedure Laterality Date    BUNIONECTOMY      HEMORROIDECTOMY      HYSTERECTOMY      POLYPECTOMY        Family History   Problem Relation Age of Onset    Heart attack Father     Heart attack Sister       Social History     Tobacco Use    Smoking status: Former     Current packs/day: 0.00     Types: Cigarettes, Pipe, Cigars     Quit date:      Years since quittin.7    Smokeless tobacco: Never    Tobacco comments:     1 pack/day for 5 years, and about 1/2 pack for 30 years   Vaping Use    Vaping status: Never Used   Substance Use Topics    Alcohol use: Yes     Alcohol/week: 21.0 standard drinks of alcohol     Types: 21 Glasses of wine per week    Drug use: No      E-Cigarette/Vaping    E-Cigarette Use Never User       E-Cigarette/Vaping Substances      I have reviewed and agree with the history as documented.             74-year-old female, known anemia, currently receiving iron transfusions, received it yesterday, said shortly after receiving it went home and had an episode urination that had marlon-colored urine, admits this made her very nervous so she came here for evaluation.  Also is nervous about she had a home pulse ox that the number was bouncing between 7080 and then eventually settled at 100 but because it was low to " start off with it made her nervous and she was concerned she was hypoxic so she came here.  Currently she has no symptoms.,  Flat out states that she is actually regrets coming to the emergency department and feels well feels that she was just having panic attacks at home, no chest pain no shortness of breath no abdominal pain.  No falls no injury no trauma.  No headache no neck pain      History provided by:  Patient and spouse      Review of Systems   Constitutional:  Negative for activity change, chills, diaphoresis and fever.   HENT:  Negative for congestion, sinus pressure and sore throat.    Eyes:  Negative for pain and visual disturbance.   Respiratory:  Negative for cough, chest tightness, shortness of breath, wheezing and stridor.    Cardiovascular:  Negative for chest pain and palpitations.   Gastrointestinal:  Negative for abdominal distention, abdominal pain, constipation, diarrhea, nausea and vomiting.   Genitourinary:  Negative for dysuria and frequency.   Musculoskeletal:  Negative for neck pain and neck stiffness.   Skin:  Negative for rash.   Neurological:  Negative for dizziness, speech difficulty, light-headedness, numbness and headaches.           Objective       ED Triage Vitals [09/27/24 1534]   Temperature Pulse Blood Pressure Respirations SpO2 Patient Position - Orthostatic VS   98.1 °F (36.7 °C) 66 108/55 18 100 % Sitting      Temp Source Heart Rate Source BP Location FiO2 (%) Pain Score    Oral Monitor Right arm -- --      Vitals      Date and Time Temp Pulse SpO2 Resp BP Pain Score FACES Pain Rating User   09/27/24 1534 98.1 °F (36.7 °C) 66 100 % 18 108/55 -- -- TS            Physical Exam  Vitals reviewed.   Constitutional:       General: She is not in acute distress.     Appearance: She is well-developed. She is not diaphoretic.   HENT:      Head: Normocephalic and atraumatic.      Right Ear: External ear normal.      Left Ear: External ear normal.      Nose: Nose normal.   Eyes:       General:         Right eye: No discharge.         Left eye: No discharge.      Pupils: Pupils are equal, round, and reactive to light.   Neck:      Trachea: No tracheal deviation.   Cardiovascular:      Rate and Rhythm: Normal rate and regular rhythm.      Heart sounds: Normal heart sounds. No murmur heard.  Pulmonary:      Effort: Pulmonary effort is normal. No respiratory distress.      Breath sounds: Normal breath sounds. No stridor.   Abdominal:      General: There is no distension.      Palpations: Abdomen is soft.      Tenderness: There is no abdominal tenderness. There is no guarding or rebound.   Musculoskeletal:         General: Normal range of motion.      Cervical back: Normal range of motion and neck supple.   Skin:     General: Skin is warm and dry.      Coloration: Skin is not pale.      Findings: No erythema.   Neurological:      General: No focal deficit present.      Mental Status: She is alert and oriented to person, place, and time.         Results Reviewed       None            No orders to display       Procedures    ED Medication and Procedure Management   Prior to Admission Medications   Prescriptions Last Dose Informant Patient Reported? Taking?   Cholecalciferol (VITAMIN D3) 1000 UNITS CAPS  Self Yes No   Sig: Take by mouth daily   FLUoxetine (PROzac) 40 MG capsule  Self Yes No   Sig: Take 40 mg by mouth daily   LORazepam (ATIVAN) 0.5 mg tablet  Self Yes No   Sig: Take 0.5 mg by mouth every 6 (six) hours as needed for anxiety   Magnesium 400 MG TABS  Self Yes No   Sig: Take by mouth   Qvar RediHaler 80 MCG/ACT inhaler  Self Yes No   Si puffs 2 (two) times a day   albuterol (PROVENTIL HFA,VENTOLIN HFA) 90 mcg/act inhaler  Self Yes No   Sig: Inhale 2 puffs every 6 (six) hours as needed for wheezing   b complex vitamins capsule  Self Yes No   Sig: Take 1 capsule by mouth daily   flecainide (TAMBOCOR) 50 mg tablet   No No   Sig: TAKE 1 AND 1/2 TABLETS(75 MG) BY MOUTH TWICE DAILY   loperamide  (IMODIUM A-D) 2 MG tablet  Self Yes No   Sig: Take 2 mg by mouth as needed for diarrhea   losartan (COZAAR) 100 MG tablet   No No   Sig: TAKE 1 TABLET(100 MG) BY MOUTH DAILY   metoprolol succinate (TOPROL-XL) 50 mg 24 hr tablet   No No   Sig: TAKE 1 TABLET(50 MG) BY MOUTH DAILY   potassium chloride (MICRO-K) 10 MEQ CR capsule  Self Yes No   Sig: Take 20 mEq by mouth 2 (two) times a day      Facility-Administered Medications: None     Discharge Medication List as of 9/27/2024  4:13 PM        CONTINUE these medications which have NOT CHANGED    Details   albuterol (PROVENTIL HFA,VENTOLIN HFA) 90 mcg/act inhaler Inhale 2 puffs every 6 (six) hours as needed for wheezing, Historical Med      b complex vitamins capsule Take 1 capsule by mouth daily, Historical Med      Cholecalciferol (VITAMIN D3) 1000 UNITS CAPS Take by mouth daily, Historical Med      flecainide (TAMBOCOR) 50 mg tablet TAKE 1 AND 1/2 TABLETS(75 MG) BY MOUTH TWICE DAILY, Normal      FLUoxetine (PROzac) 40 MG capsule Take 40 mg by mouth daily, Historical Med      loperamide (IMODIUM A-D) 2 MG tablet Take 2 mg by mouth as needed for diarrhea, Historical Med      LORazepam (ATIVAN) 0.5 mg tablet Take 0.5 mg by mouth every 6 (six) hours as needed for anxiety, Historical Med      losartan (COZAAR) 100 MG tablet TAKE 1 TABLET(100 MG) BY MOUTH DAILY, Starting Tue 9/24/2024, Normal      Magnesium 400 MG TABS Take by mouth, Historical Med      metoprolol succinate (TOPROL-XL) 50 mg 24 hr tablet TAKE 1 TABLET(50 MG) BY MOUTH DAILY, Starting Tue 9/24/2024, Normal      potassium chloride (MICRO-K) 10 MEQ CR capsule Take 20 mEq by mouth 2 (two) times a day, Historical Med      Qvar RediHaler 80 MCG/ACT inhaler 2 puffs 2 (two) times a day, Starting Tue 10/12/2021, Historical Med           No discharge procedures on file.  ED SEPSIS DOCUMENTATION   Time reflects when diagnosis was documented in both MDM as applicable and the Disposition within this note       Time User  Action Codes Description Comment    9/27/2024  4:12 PM Chris Dee [D64.9] Symptomatic anemia     9/27/2024  4:12 PM Chris Dee [F41.1] Anxiety state                  Chris Dee,   09/27/24 1809

## 2024-09-29 LAB — METHYLMALONATE SERPL-SCNC: 110 NMOL/L (ref 0–378)

## 2024-10-01 ENCOUNTER — NURSE TRIAGE (OUTPATIENT)
Age: 74
End: 2024-10-01

## 2024-10-01 NOTE — TELEPHONE ENCOUNTER
"Reason for Disposition   Nursing judgment    Answer Assessment - Initial Assessment Questions  1. REASON FOR CALL or QUESTION: \"What is your reason for calling today?\" or \"How can I best help you?\" or \"What question do you have that I can help answer?\"      Ari brown would like to discuss the results with Dr Walker.     Please call at [phone]    Protocols used: Information Only Call - No Triage-ADULT-[place]    "

## 2024-10-01 NOTE — TELEPHONE ENCOUNTER
Discussed the finding of stress test and echocardiogram with the patient.  We will continue medical management.  Anemia seems to be the dominant feature in her complaints.

## 2024-10-03 ENCOUNTER — HOSPITAL ENCOUNTER (OUTPATIENT)
Dept: INFUSION CENTER | Facility: CLINIC | Age: 74
Discharge: HOME | End: 2024-10-03
Payer: COMMERCIAL

## 2024-10-03 VITALS
RESPIRATION RATE: 18 BRPM | SYSTOLIC BLOOD PRESSURE: 101 MMHG | HEART RATE: 62 BPM | TEMPERATURE: 98.2 F | DIASTOLIC BLOOD PRESSURE: 64 MMHG | OXYGEN SATURATION: 99 %

## 2024-10-03 DIAGNOSIS — D50.0 IRON DEFICIENCY ANEMIA DUE TO CHRONIC BLOOD LOSS: Primary | ICD-10-CM

## 2024-10-03 DIAGNOSIS — D50.0 IRON DEFICIENCY ANEMIA DUE TO CHRONIC BLOOD LOSS: ICD-10-CM

## 2024-10-03 PROCEDURE — 96365 THER/PROPH/DIAG IV INF INIT: CPT

## 2024-10-03 RX ORDER — SODIUM CHLORIDE 9 MG/ML
20 INJECTION, SOLUTION INTRAVENOUS ONCE
Status: COMPLETED | OUTPATIENT
Start: 2024-10-03 | End: 2024-10-03

## 2024-10-03 RX ORDER — SODIUM CHLORIDE 9 MG/ML
20 INJECTION, SOLUTION INTRAVENOUS ONCE
Status: CANCELLED | OUTPATIENT
Start: 2024-10-10

## 2024-10-03 RX ADMIN — SODIUM CHLORIDE 20 ML/HR: 0.9 INJECTION, SOLUTION INTRAVENOUS at 15:40

## 2024-10-03 RX ADMIN — IRON SUCROSE 300 MG: 20 INJECTION, SOLUTION INTRAVENOUS at 15:40

## 2024-10-03 NOTE — PLAN OF CARE
Problem: Potential for Falls  Goal: Patient will remain free of falls  Description: INTERVENTIONS:  - Educate patient/family on patient safety including physical limitations  - Instruct patient to call for assistance with activity   - Consult OT/PT to assist with strengthening/mobility   - Keep Call bell within reach  - Keep bed low and locked with side rails adjusted as appropriate  - Keep care items and personal belongings within reach  - Initiate and maintain comfort rounds  - Make Fall Risk Sign visible to staff  -- Apply yellow socks and bracelet for high fall risk patients  - Consider moving patient to room near nurses station  Outcome: Completed     Problem: Knowledge Deficit  Goal: Patient/family/caregiver demonstrates understanding of disease process, treatment plan, medications, and discharge instructions  Description: Complete learning assessment and assess knowledge base.  Interventions:  - Provide teaching at level of understanding  - Provide teaching via preferred learning methods  Outcome: Completed

## 2024-10-03 NOTE — PROGRESS NOTES
Pt to clinic for venofer infusion. Pt tolerated without complications. Next appointment Oct 10 at 3:00

## 2024-10-09 ENCOUNTER — OFFICE VISIT (OUTPATIENT)
Dept: HEMATOLOGY ONCOLOGY | Facility: CLINIC | Age: 74
End: 2024-10-09
Payer: COMMERCIAL

## 2024-10-09 VITALS
WEIGHT: 140 LBS | OXYGEN SATURATION: 100 % | HEIGHT: 65 IN | TEMPERATURE: 97.8 F | SYSTOLIC BLOOD PRESSURE: 120 MMHG | BODY MASS INDEX: 23.32 KG/M2 | DIASTOLIC BLOOD PRESSURE: 66 MMHG | HEART RATE: 55 BPM | RESPIRATION RATE: 16 BRPM

## 2024-10-09 DIAGNOSIS — D50.0 IRON DEFICIENCY ANEMIA DUE TO CHRONIC BLOOD LOSS: ICD-10-CM

## 2024-10-09 DIAGNOSIS — D68.00 VON WILLEBRAND DISEASE (HCC): Primary | ICD-10-CM

## 2024-10-09 DIAGNOSIS — K92.1 HEMATOCHEZIA: ICD-10-CM

## 2024-10-09 PROBLEM — R19.7 DIARRHEA: Status: RESOLVED | Noted: 2020-08-12 | Resolved: 2024-10-09

## 2024-10-09 PROCEDURE — 99214 OFFICE O/P EST MOD 30 MIN: CPT | Performed by: INTERNAL MEDICINE

## 2024-10-09 NOTE — PROGRESS NOTES
Hematology Outpatient Follow - Up Note  Ari Parsons 74 y.o. female MRN: @ Encounter: [id number]        Date:  10/9/2024        Assessment/ Plan:    Type 2A von Willebrand Disease:   patient has a well-established history of type 2A von Willebrand disease, of which is unresponsive to Desmopressin (DDAVP). She has required inpatient hospital admission for administration of Human Plasma-Derived von Willebrand Factor (Humate P), in anticipation of prior surgical-interventions    Humate-P 1500 international unit 30 to 60 minutes prior to colonoscopy or minor procedure and if she is staying in the hospital beyond 8 hours, will give Humate-P 900 unit every 12-hour x 2 to 3 doses depends on the procedure with Solu-Medrol 40 mg IV prior to her first dose and Benadryl    Regarding iron deficiency anemia from the hemorrhoids, she received IV iron Venofer, she will continue with Venna for and follow-up in 6-month with CBC, iron studies    Atrial flutter she had right-sided ablation            Labs and imaging studies are reviewed by ordering provider once results are available. If there are findings that need immediate attention, you will be contacted when results available.   Discussing results and the implication on your healthcare is best discussed in person at your follow-up visit.       HPI:  74-year-old female, with an established history of hypertension, atrial-flutter (No Oral Anticoagulation or Antiplatelet Therapy), iron deficiency anemia, type 2A von Willebrand disease, and generalized anxiety disorder; who presents today for initial consultation. Historical information was obtained from patient, and prior documentation. Of note, patient was seen for initial consultation by [facility] - Hematology (Arminda Marcelo M.D.) in December 2018; due to established history of type 2A von Willebrand disease. Patient was initially diagnosed by [facility]ematology, following  presentation with easy bruisability, and menorrhagia. Given absence of response to desmopressin (DDAVP), patient was recommended hospital admission for administration of Human Plasma-Derived von Willebrand Factor (Humate P), prior to planned surgical-interventions.    Iron deficiency anemia secondary to hemorrhoidal bleeding with persistent low ferritin, she received IV iron in the past    She presented with hemoglobin of 6.9 on September 2024, she received 2 Venofer    She persistent hematochezia from the hemorrhoids    Interval History:        Previous Treatment:         Test Results:    Imaging: Stress strip    Result Date: 9/27/2024  Narrative: Confirmed by MIS POTTER ([id number]),  Diego Simmons ([id number]) on 9/27/2024 8:49:11 AM    Stress test only, exercise    Result Date: 9/24/2024  Narrative:   Stress ECG: A Mauricio protocol stress test was performed. Overall, the patient's exercise capacity was impaired for their age. The patient after exercising for 1 min and 39 sec and had a maximal HR of 90 bpm (60% of MPHR) and 4.0 METS. The patient experienced no angina during the test. The patient reported dyspnea, dizziness, leg pain and fatigue during the stress test. Blood pressure demonstrated a blunted/decreasing response to stress.   Stress ECG: The ECG was not diagnostic due to submaximal stress.     Echo complete w/ contrast if indicated    Result Date: 9/24/2024  Narrative:   Left Ventricle: Left ventricular cavity size is normal. Wall thickness is normal. The left ventricular ejection fraction is 60%. Systolic function is normal. Wall motion is normal.   Right Ventricle: Right ventricular cavity size is normal. Systolic function is normal.   Left Atrium: The atrium is mildly dilated (35-41 mL/m2).   Aortic Valve: There is mild regurgitation.   Mitral Valve: There is mild regurgitation.   Tricuspid Valve: There is mild regurgitation. The right ventricular systolic pressure is normal. The estimated right  ventricular systolic pressure is 33.00 mmHg.       Labs:   Lab Results   Component Value Date    WBC 6.62 09/25/2024    HGB 6.8 (L) 09/25/2024    HCT 22.5 (L) 09/25/2024    MCV 89 09/25/2024     09/25/2024     Lab Results   Component Value Date    K 4.5 09/25/2024     09/25/2024    CO2 22 09/25/2024    BUN 18 09/25/2024    CREATININE 0.92 09/25/2024    GLUF 90 09/25/2024    CALCIUM 8.5 09/25/2024    CORRECTEDCA 9.3 04/17/2023    AST 14 09/25/2024    ALT 9 09/25/2024    ALKPHOS 54 09/25/2024    EGFR 62 09/25/2024       Lab Results   Component Value Date    IRON <10 (L) 09/25/2024    TIBC  09/25/2024      Comment:      Unable to calculate.    FERRITIN 4 (L) 09/25/2024       Lab Results   Component Value Date    [id number] 522 03/01/2024         ROS: Review of Systems   Constitutional:  Negative for appetite change, chills, diaphoresis, fatigue and unexpected weight change.   HENT:   Negative for mouth sores, nosebleeds, sore throat, trouble swallowing and voice change.    Eyes:  Negative for eye problems and icterus.   Respiratory:  Negative for chest tightness, cough, hemoptysis and wheezing.    Cardiovascular:  Negative for chest pain, leg swelling and palpitations.   Gastrointestinal:  Negative for abdominal distention, abdominal pain, blood in stool, constipation, diarrhea, nausea and vomiting.   Endocrine: Negative for hot flashes.   Genitourinary:  Negative for bladder incontinence, difficulty urinating, dyspareunia, dysuria and frequency.    Musculoskeletal:  Negative for arthralgias, back pain, gait problem, neck pain and neck stiffness.   Skin:  Negative for itching and rash.   Neurological:  Positive for light-headedness. Negative for dizziness, gait problem, headaches, numbness, seizures and speech difficulty.   Hematological:  Negative for adenopathy. Does not bruise/bleed easily.   Psychiatric/Behavioral:  Negative for decreased concentration, depression, sleep disturbance and suicidal ideas.  The patient is not nervous/anxious.           Current Medications: Reviewed  Allergies: Reviewed  PMH/FH/SH:  Reviewed      Physical Exam:    Body surface area is 1.7 meters squared.    Wt Readings from Last 3 Encounters:   10/09/24 63.5 kg (140 lb)   09/27/24 63 kg (138 lb 14.2 oz)   09/24/24 62.6 kg (138 lb)        Temp Readings from Last 3 Encounters:   10/09/24 97.8 °F (36.6 °C) (Temporal)   10/03/24 98.2 °F (36.8 °C) (Temporal)   09/27/24 98.1 °F (36.7 °C) (Oral)        BP Readings from Last 3 Encounters:   10/09/24 120/66   10/03/24 101/64   09/27/24 108/55         Pulse Readings from Last 3 Encounters:   10/09/24 55   10/03/24 62   09/27/24 66        Physical Exam  Vitals reviewed.   Constitutional:       General: She is not in acute distress.     Appearance: She is well-developed. She is not diaphoretic.   HENT:      Head: Normocephalic and atraumatic.   Eyes:      Conjunctiva/sclera: Conjunctivae normal.   Neck:      Trachea: No tracheal deviation.   Cardiovascular:      Rate and Rhythm: Normal rate and regular rhythm.      Heart sounds: Murmur heard.      No friction rub. No gallop.   Pulmonary:      Effort: Pulmonary effort is normal. No respiratory distress.      Breath sounds: Normal breath sounds. No wheezing or rales.   Chest:      Chest wall: No tenderness.   Abdominal:      General: There is no distension.      Palpations: Abdomen is soft.      Tenderness: There is no abdominal tenderness.   Musculoskeletal:      Cervical back: Normal range of motion and neck supple.      Right lower leg: No edema.      Left lower leg: No edema.   Lymphadenopathy:      Cervical: No cervical adenopathy.   Skin:     General: Skin is warm and dry.      Coloration: Skin is not pale.      Findings: No erythema.   Neurological:      Mental Status: She is alert. Mental status is at baseline.   Psychiatric:         Behavior: Behavior normal.         Thought Content: Thought content normal.         ECOG PS:1    Goals and  Barriers:  Current Goal: Minimize effects of disease.   Barriers: None.      Patient's Capacity to Self Care:  Patient is able to self care.    Code Status: [facility]

## 2024-10-10 ENCOUNTER — HOSPITAL ENCOUNTER (OUTPATIENT)
Dept: INFUSION CENTER | Facility: CLINIC | Age: 74
Discharge: HOME | End: 2024-10-10
Payer: COMMERCIAL

## 2024-10-10 VITALS
TEMPERATURE: 97.5 F | DIASTOLIC BLOOD PRESSURE: 69 MMHG | HEART RATE: 53 BPM | RESPIRATION RATE: 16 BRPM | SYSTOLIC BLOOD PRESSURE: 108 MMHG | OXYGEN SATURATION: 98 %

## 2024-10-10 DIAGNOSIS — D50.0 IRON DEFICIENCY ANEMIA DUE TO CHRONIC BLOOD LOSS: Primary | ICD-10-CM

## 2024-10-10 DIAGNOSIS — D50.0 IRON DEFICIENCY ANEMIA DUE TO CHRONIC BLOOD LOSS: ICD-10-CM

## 2024-10-10 PROCEDURE — 96365 THER/PROPH/DIAG IV INF INIT: CPT

## 2024-10-10 PROCEDURE — 96366 THER/PROPH/DIAG IV INF ADDON: CPT

## 2024-10-10 RX ORDER — SODIUM CHLORIDE 9 MG/ML
20 INJECTION, SOLUTION INTRAVENOUS ONCE
Status: COMPLETED | OUTPATIENT
Start: 2024-10-10 | End: 2024-10-10

## 2024-10-10 RX ORDER — SODIUM CHLORIDE 9 MG/ML
20 INJECTION, SOLUTION INTRAVENOUS ONCE
Status: CANCELLED | OUTPATIENT
Start: 2024-10-18

## 2024-10-10 RX ADMIN — SODIUM CHLORIDE 20 ML/HR: 0.9 INJECTION, SOLUTION INTRAVENOUS at 15:09

## 2024-10-10 RX ADMIN — IRON SUCROSE 300 MG: 20 INJECTION, SOLUTION INTRAVENOUS at 15:10

## 2024-10-10 NOTE — PROGRESS NOTES
Pt tolerated tx without issue. PIV removed. Pt confirmed next appointment on 10/18 @0406 AN. ZIONS declined.

## 2024-10-18 ENCOUNTER — HOSPITAL ENCOUNTER (OUTPATIENT)
Dept: INFUSION CENTER | Facility: CLINIC | Age: 74
End: 2024-10-18
Payer: COMMERCIAL

## 2024-10-18 VITALS
TEMPERATURE: 98.6 F | SYSTOLIC BLOOD PRESSURE: 143 MMHG | DIASTOLIC BLOOD PRESSURE: 81 MMHG | HEART RATE: 59 BPM | RESPIRATION RATE: 18 BRPM | OXYGEN SATURATION: 98 %

## 2024-10-18 DIAGNOSIS — D50.0 IRON DEFICIENCY ANEMIA DUE TO CHRONIC BLOOD LOSS: ICD-10-CM

## 2024-10-18 DIAGNOSIS — D50.0 IRON DEFICIENCY ANEMIA DUE TO CHRONIC BLOOD LOSS: Primary | ICD-10-CM

## 2024-10-18 PROCEDURE — 96365 THER/PROPH/DIAG IV INF INIT: CPT

## 2024-10-18 RX ORDER — SODIUM CHLORIDE 9 MG/ML
20 INJECTION, SOLUTION INTRAVENOUS ONCE
Status: COMPLETED | OUTPATIENT
Start: 2024-10-18 | End: 2024-10-18

## 2024-10-18 RX ORDER — SODIUM CHLORIDE 9 MG/ML
20 INJECTION, SOLUTION INTRAVENOUS ONCE
Status: CANCELLED | OUTPATIENT
Start: 2024-10-24

## 2024-10-18 RX ADMIN — IRON SUCROSE 300 MG: 20 INJECTION, SOLUTION INTRAVENOUS at 15:50

## 2024-10-18 RX ADMIN — SODIUM CHLORIDE 20 ML/HR: 0.9 INJECTION, SOLUTION INTRAVENOUS at 15:51

## 2024-10-18 NOTE — PROGRESS NOTES
Patient tolerated treatment today without issue. PIV removed intact, coban wrap in place. Last ordered Venofer. Patient aware of f/u with ordering Dr on 4/9 at AN infusion @ 2PM and to have labs drawn prior.

## 2024-11-20 ENCOUNTER — OFFICE VISIT (OUTPATIENT)
Dept: GASTROENTEROLOGY | Facility: CLINIC | Age: 74
End: 2024-11-20
Payer: COMMERCIAL

## 2024-11-20 VITALS
SYSTOLIC BLOOD PRESSURE: 118 MMHG | TEMPERATURE: 97.6 F | WEIGHT: 141.6 LBS | HEIGHT: 65 IN | BODY MASS INDEX: 23.59 KG/M2 | DIASTOLIC BLOOD PRESSURE: 64 MMHG

## 2024-11-20 DIAGNOSIS — K64.8 INTERNAL HEMORRHOID: ICD-10-CM

## 2024-11-20 DIAGNOSIS — D50.9 IRON DEFICIENCY ANEMIA, UNSPECIFIED IRON DEFICIENCY ANEMIA TYPE: ICD-10-CM

## 2024-11-20 DIAGNOSIS — K52.831 COLLAGENOUS COLITIS: Primary | ICD-10-CM

## 2024-11-20 DIAGNOSIS — K21.00 GASTROESOPHAGEAL REFLUX DISEASE WITH ESOPHAGITIS WITHOUT HEMORRHAGE: ICD-10-CM

## 2024-11-20 PROCEDURE — 99214 OFFICE O/P EST MOD 30 MIN: CPT | Performed by: PHYSICIAN ASSISTANT

## 2024-11-20 RX ORDER — HYDROCORTISONE ACETATE 25 MG/1
25 SUPPOSITORY RECTAL 2 TIMES DAILY
Qty: 12 SUPPOSITORY | Refills: 0 | Status: SHIPPED | OUTPATIENT
Start: 2024-11-20

## 2024-11-20 RX ORDER — ESTRADIOL 1 MG/1
1 TABLET ORAL DAILY
COMMUNITY
Start: 2024-08-13

## 2024-11-20 RX ORDER — FAMOTIDINE 40 MG/1
40 TABLET, FILM COATED ORAL DAILY
Qty: 90 TABLET | Refills: 3 | Status: SHIPPED | OUTPATIENT
Start: 2024-11-20

## 2024-11-20 RX ORDER — BUDESONIDE 3 MG/1
CAPSULE, COATED PELLETS ORAL
Qty: 130 CAPSULE | Refills: 2 | Status: SHIPPED | OUTPATIENT
Start: 2024-11-20

## 2024-11-20 NOTE — PROGRESS NOTES
"Name: Ari Parsons      : 1950      MRN: [id number]  Encounter Provider: Meredith Yousif PA-C  Encounter Date: 2024   Encounter department: [facility] [place]  :  Ari is a 72 y/o female with HTN,VWB who presents for follow-up.    Assessment & Plan  Collagenous colitis  Colonoscopy noted large internal and external hemorrhoids so she was referred to our colon and rectal team, however colonoscopy also noted collagenous colitis on random colon biopsies so budesonide taper was sent in back in  but she did not take this. She says she is still having diarrhea on a daily basis, so she is willing to start this now.   Orders:    budesonide (ENTOCORT EC) 3 MG capsule; Take 3 capsules (9 mg total) by mouth daily for 28 days, THEN 2 capsules (6 mg total) daily for 14 days, THEN 1 capsule (3 mg total) daily for 14 days.  -can also start using OTC pepto PRN breakthrough diarrhea  -discussed with pt that as she is on prozac, which can cause this, if she has recurrent episodes of this I would suggest speaking to her PCP about the prozac  Iron deficiency anemia, unspecified iron deficiency anemia type  Patient is following with hematology for her ongoing iron deficiency anemia.  It is suspected that the bleeding from her large internal and external hemorrhoids is contributing to this so she was referred to our colon rectal team.  Patient is also undergoing IV Venofer infusions.  Her hemoglobin was quite low at 6.8 in September so she went to the emergency room but she deferred blood transfusions.  She has not undergone repeat blood work since? She denies presyncope or syncope. She denies further rectal bleeding but says she would like \"something sent in\" for her hemorrhoids.   -anusol suppository sent in  -Calmoseptine cream  -explained to pt that getting better control of her diarrhea will also help the hemorrhoids   Orders:    CBC and differential; " Future    Gastroesophageal reflux disease with esophagitis without hemorrhage  EGD noted grade A esophagitis, gastric erosion and gastric polyps however gastric polyp was benign on pathology, gastric pathology negative for H. pylori, and duodenal biopsies negative for celiac disease. She says she has not been on PPI in several months. She has been taking tums daily.   Orders:    famotidine (PEPCID) 40 MG tablet; Take 1 tablet (40 mg total) by mouth daily    Internal hemorrhoid    Orders:    hydrocortisone (ANUSOL-HC) 25 mg suppository; Insert 1 suppository (25 mg total) into the rectum 2 (two) times a day    Ambulatory Referral to Colorectal Surgery; Future        History of Present Illness     HPI  Ari Parsons is a 74 y.o. female who presents for follow-up. She says she never took the budesonide and is still having diarrhea daily. She also admits to not seeing colon-rectal, but notes that her BRB MT has slowed down over the last few weeks. She denies n/v, trouble swallowing, unintentional weight loss, fevers, chills, night sweats, black BM. Pt denies NSAID use. She says she has not been on PPI in several months. She has been taking tums daily. She denies dizziness or feeling like she is going to pass out.   History obtained from: patient    Review of Systems   Constitutional:  Negative for chills and fever.   HENT:  Negative for ear pain and sore throat.    Eyes:  Negative for pain and visual disturbance.   Respiratory:  Negative for cough and shortness of breath.    Cardiovascular:  Negative for chest pain and palpitations.   Gastrointestinal:  Positive for anal bleeding and diarrhea. Negative for abdominal distention, abdominal pain, blood in stool, constipation, nausea, rectal pain and vomiting.   Genitourinary:  Negative for dysuria and hematuria.   Musculoskeletal:  Negative for arthralgias and back pain.   Skin:  Negative for color change and rash.   Neurological:  Negative for seizures and syncope.   All  other systems reviewed and are negative.    Medical History Reviewed by provider this encounter:     .  Past Medical History   Past Medical History:   Diagnosis Date    Anxiety     Asthma     Atrial fibrillation (HCC)     Bowel obstruction (HCC)     Hypertension     PAF (paroxysmal atrial fibrillation) (HCC)     Von Willebrand disease (HCC)      Past Surgical History:   Procedure Laterality Date    BUNIONECTOMY      COLONOSCOPY      HEMORROIDECTOMY      HYSTERECTOMY      POLYPECTOMY      UPPER GASTROINTESTINAL ENDOSCOPY       Family History   Problem Relation Age of Onset    Heart attack Father     Heart attack Sister       reports that she quit smoking about 13 years ago. Her smoking use included cigarettes, pipe, and cigars. She has never used smokeless tobacco. She reports current alcohol use of about 21.0 standard drinks of alcohol per week. She reports that she does not use drugs.  Current Outpatient Medications on File Prior to Visit   Medication Sig Dispense Refill    albuterol (PROVENTIL HFA,VENTOLIN HFA) 90 mcg/act inhaler Inhale 2 puffs every 6 (six) hours as needed for wheezing      b complex vitamins capsule Take 1 capsule by mouth daily      Cholecalciferol (VITAMIN D3) 1000 UNITS CAPS Take by mouth daily      estradiol (ESTRACE) 1 mg tablet Take 1 mg by mouth daily      flecainide (TAMBOCOR) 50 mg tablet TAKE 1 AND 1/2 TABLETS(75 MG) BY MOUTH TWICE DAILY 270 tablet 3    FLUoxetine (PROzac) 40 MG capsule Take 40 mg by mouth daily      loperamide (IMODIUM A-D) 2 MG tablet Take 2 mg by mouth as needed for diarrhea      LORazepam (ATIVAN) 0.5 mg tablet Take 0.5 mg by mouth every 6 (six) hours as needed for anxiety      losartan (COZAAR) 100 MG tablet TAKE 1 TABLET(100 MG) BY MOUTH DAILY 90 tablet 1    Magnesium 400 MG TABS Take by mouth      metoprolol succinate (TOPROL-XL) 50 mg 24 hr tablet TAKE 1 TABLET(50 MG) BY MOUTH DAILY 90 tablet 1    potassium chloride (MICRO-K) 10 MEQ CR capsule Take 20 mEq by  mouth 2 (two) times a day      Qvar RediHaler 80 MCG/ACT inhaler 2 puffs 2 (two) times a day       No current facility-administered medications on file prior to visit.     Allergies   Allergen Reactions    Bactrim [Sulfamethoxazole-Trimethoprim]     Wellbutrin [Bupropion]       Current Outpatient Medications on File Prior to Visit   Medication Sig Dispense Refill    albuterol (PROVENTIL HFA,VENTOLIN HFA) 90 mcg/act inhaler Inhale 2 puffs every 6 (six) hours as needed for wheezing      b complex vitamins capsule Take 1 capsule by mouth daily      Cholecalciferol (VITAMIN D3) 1000 UNITS CAPS Take by mouth daily      estradiol (ESTRACE) 1 mg tablet Take 1 mg by mouth daily      flecainide (TAMBOCOR) 50 mg tablet TAKE 1 AND 1/2 TABLETS(75 MG) BY MOUTH TWICE DAILY 270 tablet 3    FLUoxetine (PROzac) 40 MG capsule Take 40 mg by mouth daily      loperamide (IMODIUM A-D) 2 MG tablet Take 2 mg by mouth as needed for diarrhea      LORazepam (ATIVAN) 0.5 mg tablet Take 0.5 mg by mouth every 6 (six) hours as needed for anxiety      losartan (COZAAR) 100 MG tablet TAKE 1 TABLET(100 MG) BY MOUTH DAILY 90 tablet 1    Magnesium 400 MG TABS Take by mouth      metoprolol succinate (TOPROL-XL) 50 mg 24 hr tablet TAKE 1 TABLET(50 MG) BY MOUTH DAILY 90 tablet 1    potassium chloride (MICRO-K) 10 MEQ CR capsule Take 20 mEq by mouth 2 (two) times a day      Qvar RediHaler 80 MCG/ACT inhaler 2 puffs 2 (two) times a day       No current facility-administered medications on file prior to visit.      Social History     Tobacco Use    Smoking status: Former     Current packs/day: 0.00     Types: Cigarettes, Pipe, Cigars     Quit date:      Years since quittin.8    Smokeless tobacco: Never    Tobacco comments:     1 pack/day for 5 years, and about 1/2 pack for 30 years   Vaping Use    Vaping status: Never Used   Substance and Sexual Activity    Alcohol use: Yes     Alcohol/week: 21.0 standard drinks of alcohol     Types: 21 Glasses of  wine per week    Drug use: No    Sexual activity: Not on file        Objective   There were no vitals taken for this visit.     Physical Exam  Constitutional:       Appearance: Normal appearance.   Cardiovascular:      Rate and Rhythm: Normal rate and regular rhythm.   Pulmonary:      Breath sounds: Normal breath sounds.   Abdominal:      General: Bowel sounds are normal. There is no distension.      Palpations: There is no mass.      Tenderness: There is no abdominal tenderness. There is no guarding or rebound. Negative signs include Morin's sign and Rovsing's sign.   Neurological:      Mental Status: She is alert.         Administrative Statements   I have spent a total time of 30 minutes in caring for this patient on the day of the visit/encounter including Diagnostic results, Prognosis, Risks and benefits of tx options, Instructions for management, Patient and family education, Importance of tx compliance, Risk factor reductions, Impressions, Counseling / Coordination of care, Documenting in the medical record, Reviewing / ordering tests, medicine, procedures  , Obtaining or reviewing history  , and Communicating with other healthcare professionals .

## 2024-11-20 NOTE — PATIENT INSTRUCTIONS
Over-the-counter Calmoseptine cream as needed for external hemorrhoid   Anusol suppository for internal hemorrhoids  Budesonide taper   Pepto: as needed   Blood work to check anemia

## 2024-11-20 NOTE — ASSESSMENT & PLAN NOTE
"Patient is following with hematology for her ongoing iron deficiency anemia.  It is suspected that the bleeding from her large internal and external hemorrhoids is contributing to this so she was referred to our colon rectal team.  Patient is also undergoing IV Venofer infusions.  Her hemoglobin was quite low at 6.8 in September so she went to the emergency room but she deferred blood transfusions.  She has not undergone repeat blood work since? She denies presyncope or syncope. She denies further rectal bleeding but says she would like \"something sent in\" for her hemorrhoids.   -anusol suppository sent in  -Calmoseptine cream  -explained to pt that getting better control of her diarrhea will also help the hemorrhoids   Orders:    CBC and differential; Future    "

## 2024-12-06 ENCOUNTER — APPOINTMENT (OUTPATIENT)
Dept: LAB | Facility: CLINIC | Age: 74
End: 2024-12-06
Payer: COMMERCIAL

## 2024-12-06 DIAGNOSIS — D50.0 IRON DEFICIENCY ANEMIA DUE TO CHRONIC BLOOD LOSS: ICD-10-CM

## 2024-12-06 DIAGNOSIS — D50.9 IRON DEFICIENCY ANEMIA, UNSPECIFIED IRON DEFICIENCY ANEMIA TYPE: ICD-10-CM

## 2024-12-06 DIAGNOSIS — D68.00 VON WILLEBRAND DISEASE (HCC): ICD-10-CM

## 2024-12-06 DIAGNOSIS — K92.1 HEMATOCHEZIA: ICD-10-CM

## 2024-12-06 LAB
BASOPHILS # BLD AUTO: 0.05 THOUSANDS/ÂΜL (ref 0–0.1)
BASOPHILS NFR BLD AUTO: 1 % (ref 0–1)
EOSINOPHIL # BLD AUTO: 0.17 THOUSAND/ÂΜL (ref 0–0.61)
EOSINOPHIL NFR BLD AUTO: 2 % (ref 0–6)
ERYTHROCYTE [DISTWIDTH] IN BLOOD BY AUTOMATED COUNT: 19.1 % (ref 11.6–15.1)
FERRITIN SERPL-MCNC: 87 NG/ML (ref 11–307)
HCT VFR BLD AUTO: 35.3 % (ref 34.8–46.1)
HGB BLD-MCNC: 11.3 G/DL (ref 11.5–15.4)
IMM GRANULOCYTES # BLD AUTO: 0.06 THOUSAND/UL (ref 0–0.2)
IMM GRANULOCYTES NFR BLD AUTO: 1 % (ref 0–2)
IRON SATN MFR SERPL: 21 % (ref 15–50)
IRON SERPL-MCNC: 87 UG/DL (ref 50–212)
LYMPHOCYTES # BLD AUTO: 1.66 THOUSANDS/ÂΜL (ref 0.6–4.47)
LYMPHOCYTES NFR BLD AUTO: 18 % (ref 14–44)
MCH RBC QN AUTO: 31 PG (ref 26.8–34.3)
MCHC RBC AUTO-ENTMCNC: 32 G/DL (ref 31.4–37.4)
MCV RBC AUTO: 97 FL (ref 82–98)
MONOCYTES # BLD AUTO: 0.98 THOUSAND/ÂΜL (ref 0.17–1.22)
MONOCYTES NFR BLD AUTO: 11 % (ref 4–12)
NEUTROPHILS # BLD AUTO: 6.34 THOUSANDS/ÂΜL (ref 1.85–7.62)
NEUTS SEG NFR BLD AUTO: 67 % (ref 43–75)
NRBC BLD AUTO-RTO: 0 /100 WBCS
PLATELET # BLD AUTO: 287 THOUSANDS/UL (ref 149–390)
PMV BLD AUTO: 9.9 FL (ref 8.9–12.7)
RBC # BLD AUTO: 3.64 MILLION/UL (ref 3.81–5.12)
TIBC SERPL-MCNC: 413 UG/DL (ref 250–450)
UIBC SERPL-MCNC: 326 UG/DL (ref 155–355)
WBC # BLD AUTO: 9.26 THOUSAND/UL (ref 4.31–10.16)

## 2024-12-06 PROCEDURE — 36415 COLL VENOUS BLD VENIPUNCTURE: CPT

## 2024-12-06 PROCEDURE — 83540 ASSAY OF IRON: CPT

## 2024-12-06 PROCEDURE — 83550 IRON BINDING TEST: CPT

## 2024-12-06 PROCEDURE — 85025 COMPLETE CBC W/AUTO DIFF WBC: CPT

## 2024-12-06 PROCEDURE — 82728 ASSAY OF FERRITIN: CPT

## 2025-01-02 DIAGNOSIS — Z00.6 ENCOUNTER FOR EXAMINATION FOR NORMAL COMPARISON OR CONTROL IN CLINICAL RESEARCH PROGRAM: ICD-10-CM

## 2025-01-21 ENCOUNTER — APPOINTMENT (OUTPATIENT)
Dept: LAB | Facility: CLINIC | Age: 75
End: 2025-01-21
Payer: COMMERCIAL

## 2025-01-21 ENCOUNTER — OFFICE VISIT (OUTPATIENT)
Dept: CARDIOLOGY CLINIC | Facility: CLINIC | Age: 75
End: 2025-01-21
Payer: COMMERCIAL

## 2025-01-21 VITALS
OXYGEN SATURATION: 99 % | HEART RATE: 54 BPM | DIASTOLIC BLOOD PRESSURE: 64 MMHG | BODY MASS INDEX: 24.49 KG/M2 | WEIGHT: 147 LBS | SYSTOLIC BLOOD PRESSURE: 132 MMHG | HEIGHT: 65 IN

## 2025-01-21 DIAGNOSIS — I10 ESSENTIAL HYPERTENSION: ICD-10-CM

## 2025-01-21 DIAGNOSIS — D64.9 ANEMIA, UNSPECIFIED TYPE: ICD-10-CM

## 2025-01-21 DIAGNOSIS — F41.9 ANXIETY DISORDER, UNSPECIFIED TYPE: ICD-10-CM

## 2025-01-21 DIAGNOSIS — Z00.6 ENCOUNTER FOR EXAMINATION FOR NORMAL COMPARISON OR CONTROL IN CLINICAL RESEARCH PROGRAM: ICD-10-CM

## 2025-01-21 DIAGNOSIS — I48.0 PAROXYSMAL ATRIAL FIBRILLATION (HCC): Primary | ICD-10-CM

## 2025-01-21 LAB
ALBUMIN SERPL BCG-MCNC: 3.4 G/DL (ref 3.5–5)
ALP SERPL-CCNC: 82 U/L (ref 34–104)
ALT SERPL W P-5'-P-CCNC: 19 U/L (ref 7–52)
ANION GAP SERPL CALCULATED.3IONS-SCNC: 8 MMOL/L (ref 4–13)
AST SERPL W P-5'-P-CCNC: 36 U/L (ref 13–39)
BILIRUB SERPL-MCNC: 0.65 MG/DL (ref 0.2–1)
BUN SERPL-MCNC: 19 MG/DL (ref 5–25)
CALCIUM ALBUM COR SERPL-MCNC: 9.9 MG/DL (ref 8.3–10.1)
CALCIUM SERPL-MCNC: 9.4 MG/DL (ref 8.4–10.2)
CHLORIDE SERPL-SCNC: 98 MMOL/L (ref 96–108)
CO2 SERPL-SCNC: 25 MMOL/L (ref 21–32)
CREAT SERPL-MCNC: 0.98 MG/DL (ref 0.6–1.3)
ERYTHROCYTE [DISTWIDTH] IN BLOOD BY AUTOMATED COUNT: 14.1 % (ref 11.6–15.1)
GFR SERPL CREATININE-BSD FRML MDRD: 57 ML/MIN/1.73SQ M
GLUCOSE P FAST SERPL-MCNC: 83 MG/DL (ref 65–99)
HCT VFR BLD AUTO: 43.7 % (ref 34.8–46.1)
HGB BLD-MCNC: 14.3 G/DL (ref 11.5–15.4)
MCH RBC QN AUTO: 32.1 PG (ref 26.8–34.3)
MCHC RBC AUTO-ENTMCNC: 32.7 G/DL (ref 31.4–37.4)
MCV RBC AUTO: 98 FL (ref 82–98)
PLATELET # BLD AUTO: 262 THOUSANDS/UL (ref 149–390)
PMV BLD AUTO: 10.1 FL (ref 8.9–12.7)
POTASSIUM SERPL-SCNC: 5.8 MMOL/L (ref 3.5–5.3)
PROT SERPL-MCNC: 7.1 G/DL (ref 6.4–8.4)
RBC # BLD AUTO: 4.45 MILLION/UL (ref 3.81–5.12)
SODIUM SERPL-SCNC: 131 MMOL/L (ref 135–147)
WBC # BLD AUTO: 7.86 THOUSAND/UL (ref 4.31–10.16)

## 2025-01-21 PROCEDURE — 36415 COLL VENOUS BLD VENIPUNCTURE: CPT

## 2025-01-21 PROCEDURE — 80053 COMPREHEN METABOLIC PANEL: CPT

## 2025-01-21 PROCEDURE — 99214 OFFICE O/P EST MOD 30 MIN: CPT | Performed by: INTERNAL MEDICINE

## 2025-01-21 PROCEDURE — 86803 HEPATITIS C AB TEST: CPT

## 2025-01-21 PROCEDURE — 85027 COMPLETE CBC AUTOMATED: CPT

## 2025-01-21 NOTE — PROGRESS NOTES
Subjective:        Patient ID: Ari Parsons is a 74 y.o. female.    Chief Complaint:  The patient presented to this office for the purpose of cardiac follow-up.  She is known to have a history of hypertension and paroxysmal atrial fibrillation status post ablation therapy.  She also has a history of anxiety and panic disorder.  The patient has been feeling fairly well.  She has experienced 1 instance of palpitation that lasted for approximately 1 hour.  This apparently occurred after consuming caffeine and some alcohol.  This was an isolated event and was not associated with any symptom of dizziness or feeling faint.  She denies any chest pain or excessive dyspnea.  She has no leg edema.      The following portions of the patient's history were reviewed and updated as appropriate: allergies, current medications, past family history, past medical history, past social history, past surgical history, and problem list.  Review of Systems   Constitutional: Negative.   Cardiovascular:  Positive for palpitations. Negative for chest pain, dyspnea on exertion, leg swelling and syncope.   Respiratory: Negative.     Psychiatric/Behavioral: Negative.            Objective:     Physical Exam  Vitals reviewed.   Constitutional:       Appearance: Normal appearance.   HENT:      Head: Normocephalic and atraumatic.   Cardiovascular:      Rate and Rhythm: Normal rate and regular rhythm.      Heart sounds: Murmur heard.   Pulmonary:      Effort: Pulmonary effort is normal.      Breath sounds: Normal breath sounds.   Musculoskeletal:      Right lower leg: No edema.      Left lower leg: No edema.   Skin:     General: Skin is warm and dry.   Neurological:      Mental Status: She is alert and oriented to person, place, and time.   Psychiatric:         Mood and Affect: Mood normal.         Behavior: Behavior normal.         Lab Review:   Appointment on 12/06/2024   Component Date Value    WBC 12/06/2024 9.26     RBC 12/06/2024 3.64 (L)      Hemoglobin 12/06/2024 11.3 (L)     Hematocrit 12/06/2024 35.3     MCV 12/06/2024 97     MCH 12/06/2024 31.0     MCHC 12/06/2024 32.0     RDW 12/06/2024 19.1 (H)     MPV 12/06/2024 9.9     Platelets 12/06/2024 287     nRBC 12/06/2024 0     Segmented % 12/06/2024 67     Immature Grans % 12/06/2024 1     Lymphocytes % 12/06/2024 18     Monocytes % 12/06/2024 11     Eosinophils Relative 12/06/2024 2     Basophils Relative 12/06/2024 1     Absolute Neutrophils 12/06/2024 6.34     Absolute Immature Grans 12/06/2024 0.06     Absolute Lymphocytes 12/06/2024 1.66     Absolute Monocytes 12/06/2024 0.98     Eosinophils Absolute 12/06/2024 0.17     Basophils Absolute 12/06/2024 0.05     Iron Saturation 12/06/2024 21     TIBC 12/06/2024 413     Iron 12/06/2024 87     UIBC 12/06/2024 326     Ferritin 12/06/2024 87          Assessment:       1. Paroxysmal atrial fibrillation (HCC)        2. Essential hypertension        3. Anxiety disorder, unspecified type        History of paroxysmal atrial fibrillation, stable.  The patient is status post ablation therapy.  It seems like the patient had an episode of palpitation that lasted for approximately 1 hour.  There has been no recurrences.  Will continue present regimen including Tambocor and metoprolol.    Hypertension, stable and adequately controlled.    Anxiety disorder, stable.  The patient will continue Zoloft.     Plan:       The patient is advised to continue present regimen.

## 2025-01-22 LAB — HCV AB SER QL: NORMAL

## 2025-01-31 LAB
APOB+LDLR+PCSK9 GENE MUT ANL BLD/T: NOT DETECTED
BRCA1+BRCA2 DEL+DUP + FULL MUT ANL BLD/T: NOT DETECTED
MLH1+MSH2+MSH6+PMS2 GN DEL+DUP+FUL M: NOT DETECTED

## 2025-02-03 ENCOUNTER — TELEPHONE (OUTPATIENT)
Dept: HEMATOLOGY ONCOLOGY | Facility: CLINIC | Age: 75
End: 2025-02-03

## 2025-02-03 NOTE — TELEPHONE ENCOUNTER
Left voicemail that his appt has been rescheduled to 4/14 at 2 pm for a virtual visit w Marisa Jade. Left [place] # if he needs to reschedule or if he would like it to be a phone call

## 2025-02-18 ENCOUNTER — OFFICE VISIT (OUTPATIENT)
Dept: GASTROENTEROLOGY | Facility: CLINIC | Age: 75
End: 2025-02-18
Payer: COMMERCIAL

## 2025-02-18 VITALS
BODY MASS INDEX: 25.09 KG/M2 | SYSTOLIC BLOOD PRESSURE: 132 MMHG | TEMPERATURE: 96.8 F | DIASTOLIC BLOOD PRESSURE: 60 MMHG | WEIGHT: 150.8 LBS

## 2025-02-18 DIAGNOSIS — R19.7 DIARRHEA, UNSPECIFIED TYPE: ICD-10-CM

## 2025-02-18 DIAGNOSIS — D50.0 IRON DEFICIENCY ANEMIA DUE TO CHRONIC BLOOD LOSS: ICD-10-CM

## 2025-02-18 DIAGNOSIS — K52.839 MICROSCOPIC COLITIS, UNSPECIFIED MICROSCOPIC COLITIS TYPE: ICD-10-CM

## 2025-02-18 DIAGNOSIS — K64.8 OTHER HEMORRHOIDS: ICD-10-CM

## 2025-02-18 DIAGNOSIS — D68.00 VON WILLEBRAND DISEASE (HCC): ICD-10-CM

## 2025-02-18 DIAGNOSIS — K52.839 MICROSCOPIC COLITIS, UNSPECIFIED MICROSCOPIC COLITIS TYPE: Primary | ICD-10-CM

## 2025-02-18 PROCEDURE — 99214 OFFICE O/P EST MOD 30 MIN: CPT | Performed by: INTERNAL MEDICINE

## 2025-02-18 PROCEDURE — G2211 COMPLEX E/M VISIT ADD ON: HCPCS | Performed by: INTERNAL MEDICINE

## 2025-02-18 RX ORDER — CHOLESTYRAMINE 4 G/9G
1 POWDER, FOR SUSPENSION ORAL 2 TIMES DAILY
Qty: 60 PACKET | Refills: 2 | Status: SHIPPED | OUTPATIENT
Start: 2025-02-18 | End: 2025-02-19

## 2025-02-18 RX ORDER — DIPHENOXYLATE HYDROCHLORIDE AND ATROPINE SULFATE 2.5; .025 MG/1; MG/1
1 TABLET ORAL 4 TIMES DAILY PRN
Qty: 30 TABLET | Refills: 0 | Status: SHIPPED | OUTPATIENT
Start: 2025-02-18

## 2025-02-18 NOTE — PROGRESS NOTES
Name: Ari Parsons      : 1950      MRN: [id number]  Encounter Provider: Jaqueline Lopez DO  Encounter Date: 2025   Encounter department: [facility] [place]  :  Assessment & Plan  Microscopic colitis, unspecified microscopic colitis type  With excellent response to budesonide however symptom recurrence immediately upon discontinuing.  Patient agrees with plan to avoid repeat courses of budesonide given potential small side effects of prolonged steroids however if in the future symptoms are severe again, 1 repeat course is reasonable which may help with more prolonged symptom remission.    In the meantime I recommended a trial of Questran and counseled on using this twice a day.  A prescription for Lomotil was also provided for help with nocturnal stools or for urgency when she has a specific event  Orders:  •  diphenoxylate-atropine (LOMOTIL) 2.5-0.025 mg per tablet; Take 1 tablet by mouth 4 (four) times a day as needed for diarrhea  •  cholestyramine (QUESTRAN) 4 g packet; Take 1 packet (4 g total) by mouth 2 (two) times a day    Diarrhea, unspecified type    Orders:  •  diphenoxylate-atropine (LOMOTIL) 2.5-0.025 mg per tablet; Take 1 tablet by mouth 4 (four) times a day as needed for diarrhea    Iron deficiency anemia due to chronic blood loss  In the past explained by severe rectal bleeding in the setting of hemorrhoids and underlying von Willebrand's disease.  Fortunately she has had no recent bleeding and hemoglobin is now normal.       Other hemorrhoids  Large hemorrhoids that have had significant bleeding in the past.  Given resolution we will hold off on colorectal surgery however referral for this may be needed in the future.       Von Willebrand disease (HCC)       Follow-up in 6 months      History of Present Illness   Ari Parsons is a 74 y.o. female who presents for follow-up regarding microscopic colitis and hemorrhoids.    She had an EGD and  colonoscopy with random biopsy of the colon consistent with collagenous colitis.  She was initially hesitant to go on budesonide but due to severity of diarrhea she eventually went on this and did a full taper.  Within a week of stopping it her symptoms returned again.    She is concerned regarding a future trip and how this may limit her ability to go on this trip/being in a camper.  Previously had issues with significant bleeding from hemorrhoids but fortunately this is completely resolved and hemoglobin is now stable.  HPI    Review of Systems A complete review of systems is negative other than that noted above in the HPI.      Current Outpatient Medications   Medication Sig Dispense Refill   • albuterol (PROVENTIL HFA,VENTOLIN HFA) 90 mcg/act inhaler Inhale 2 puffs every 6 (six) hours as needed for wheezing     • b complex vitamins capsule Take 1 capsule by mouth daily     • Cholecalciferol (VITAMIN D3) 1000 UNITS CAPS Take by mouth daily     • cholestyramine (QUESTRAN) 4 g packet Take 1 packet (4 g total) by mouth 2 (two) times a day 60 packet 2   • diphenoxylate-atropine (LOMOTIL) 2.5-0.025 mg per tablet Take 1 tablet by mouth 4 (four) times a day as needed for diarrhea 30 tablet 0   • estradiol (ESTRACE) 1 mg tablet Take 1 mg by mouth daily     • famotidine (PEPCID) 40 MG tablet Take 1 tablet (40 mg total) by mouth daily 90 tablet 3   • flecainide (TAMBOCOR) 50 mg tablet TAKE 1 AND 1/2 TABLETS(75 MG) BY MOUTH TWICE DAILY 270 tablet 3   • FLUoxetine (PROzac) 40 MG capsule Take 40 mg by mouth daily     • loperamide (IMODIUM A-D) 2 MG tablet Take 2 mg by mouth as needed for diarrhea     • LORazepam (ATIVAN) 0.5 mg tablet Take 0.5 mg by mouth every 6 (six) hours as needed for anxiety     • losartan (COZAAR) 100 MG tablet TAKE 1 TABLET(100 MG) BY MOUTH DAILY 90 tablet 1   • Magnesium 400 MG TABS Take by mouth     • metoprolol succinate (TOPROL-XL) 50 mg 24 hr tablet TAKE 1 TABLET(50 MG) BY MOUTH DAILY 90 tablet 1    • Qvar RediHaler 80 MCG/ACT inhaler 2 puffs 2 (two) times a day     • budesonide (ENTOCORT EC) 3 MG capsule Take 3 capsules (9 mg total) by mouth daily for 28 days, THEN 2 capsules (6 mg total) daily for 14 days, THEN 1 capsule (3 mg total) daily for 14 days. (Patient not taking: Reported on 2/18/2025) 130 capsule 2   • hydrocortisone (ANUSOL-HC) 25 mg suppository Insert 1 suppository (25 mg total) into the rectum 2 (two) times a day (Patient not taking: Reported on 1/21/2025) 12 suppository 0   • potassium chloride (MICRO-K) 10 MEQ CR capsule Take 20 mEq by mouth 2 (two) times a day (Patient not taking: Reported on 2/18/2025)       No current facility-administered medications for this visit.     Objective   /60 (BP Location: Right arm, Patient Position: Sitting, Cuff Size: Standard)   Temp (!) 96.8 °F (36 °C) (Tympanic)   Wt 68.4 kg (150 lb 12.8 oz)   BMI 25.09 kg/m²     Physical Exam  Constitutional:       Appearance: Normal appearance.   HENT:      Head: Normocephalic.   Pulmonary:      Effort: Pulmonary effort is normal.   Abdominal:      Palpations: Abdomen is soft.      Tenderness: There is no abdominal tenderness.   Neurological:      General: No focal deficit present.      Mental Status: She is alert.   Psychiatric:         Mood and Affect: Mood normal.            Lab Results: I personally reviewed relevant lab results.       Results for orders placed during the hospital encounter of 05/16/24    EGD    Impression  The duodenal bulb, 1st part of the duodenum and 2nd part of the duodenum appeared normal. Performed random biopsy to rule out celiac disease.  Erythematous mucosa with erosion in the fundus of the stomach  Single small nodule was visualized in the fundus of the stomach; performed cold forceps biopsy  The body of the stomach, incisura and antrum appeared normal.  Grade A esophagitis in the GE junction  The upper third of the esophagus, middle third of the esophagus and lower third of the  esophagus appeared normal.      RECOMMENDATION:  Await pathology results  Proceed with colonoscopy  PPI daily  Anti-reflux lifestyle modification            Baljit Larios MD

## 2025-02-18 NOTE — ASSESSMENT & PLAN NOTE
In the past explained by severe rectal bleeding in the setting of hemorrhoids and underlying von Willebrand's disease.  Fortunately she has had no recent bleeding and hemoglobin is now normal.

## 2025-02-19 RX ORDER — CHOLESTYRAMINE 4 G/9G
POWDER, FOR SUSPENSION ORAL
Qty: 180 EACH | Refills: 1 | Status: SHIPPED | OUTPATIENT
Start: 2025-02-19

## 2025-03-26 ENCOUNTER — TELEPHONE (OUTPATIENT)
Dept: HEMATOLOGY ONCOLOGY | Facility: CLINIC | Age: 75
End: 2025-03-26

## 2025-03-26 NOTE — TELEPHONE ENCOUNTER
Left voicemail that her appt has been rescheduled to 6/6 at 730 am with Marisa Jade.Left [place] # if she needs to reschedule

## 2025-04-03 DIAGNOSIS — I10 ESSENTIAL HYPERTENSION: ICD-10-CM

## 2025-04-03 DIAGNOSIS — I48.0 PAROXYSMAL ATRIAL FIBRILLATION (HCC): ICD-10-CM

## 2025-04-03 RX ORDER — METOPROLOL SUCCINATE 50 MG/1
50 TABLET, EXTENDED RELEASE ORAL DAILY
Qty: 90 TABLET | Refills: 1 | Status: SHIPPED | OUTPATIENT
Start: 2025-04-03

## 2025-04-03 RX ORDER — LOSARTAN POTASSIUM 100 MG/1
100 TABLET ORAL DAILY
Qty: 90 TABLET | Refills: 1 | Status: SHIPPED | OUTPATIENT
Start: 2025-04-03

## 2025-04-25 ENCOUNTER — APPOINTMENT (OUTPATIENT)
Dept: LAB | Facility: CLINIC | Age: 75
End: 2025-04-25
Attending: INTERNAL MEDICINE
Payer: COMMERCIAL

## 2025-04-25 DIAGNOSIS — E87.5 SERUM POTASSIUM ELEVATED: ICD-10-CM

## 2025-04-25 DIAGNOSIS — E87.5 HYPERKALEMIA: ICD-10-CM

## 2025-04-25 LAB
ANION GAP SERPL CALCULATED.3IONS-SCNC: 12 MMOL/L (ref 4–13)
BUN SERPL-MCNC: 11 MG/DL (ref 5–25)
CALCIUM SERPL-MCNC: 8.8 MG/DL (ref 8.4–10.2)
CHLORIDE SERPL-SCNC: 101 MMOL/L (ref 96–108)
CO2 SERPL-SCNC: 24 MMOL/L (ref 21–32)
CREAT SERPL-MCNC: 0.84 MG/DL (ref 0.6–1.3)
GFR SERPL CREATININE-BSD FRML MDRD: 69 ML/MIN/1.73SQ M
GLUCOSE P FAST SERPL-MCNC: 100 MG/DL (ref 65–99)
POTASSIUM SERPL-SCNC: 3.2 MMOL/L (ref 3.5–5.3)
SODIUM SERPL-SCNC: 137 MMOL/L (ref 135–147)

## 2025-04-25 PROCEDURE — 36415 COLL VENOUS BLD VENIPUNCTURE: CPT

## 2025-04-25 PROCEDURE — 80048 BASIC METABOLIC PNL TOTAL CA: CPT

## 2025-05-23 DIAGNOSIS — I10 ESSENTIAL HYPERTENSION: Primary | ICD-10-CM

## 2025-05-23 RX ORDER — POTASSIUM CHLORIDE 750 MG/1
20 CAPSULE, EXTENDED RELEASE ORAL 2 TIMES DAILY
Qty: 180 CAPSULE | Refills: 3 | Status: SHIPPED | OUTPATIENT
Start: 2025-05-23

## 2025-06-19 ENCOUNTER — OFFICE VISIT (OUTPATIENT)
Dept: GASTROENTEROLOGY | Facility: CLINIC | Age: 75
End: 2025-06-19
Payer: COMMERCIAL

## 2025-06-19 VITALS
WEIGHT: 132 LBS | DIASTOLIC BLOOD PRESSURE: 72 MMHG | HEIGHT: 65 IN | BODY MASS INDEX: 21.99 KG/M2 | SYSTOLIC BLOOD PRESSURE: 120 MMHG

## 2025-06-19 DIAGNOSIS — R19.7 DIARRHEA, UNSPECIFIED TYPE: ICD-10-CM

## 2025-06-19 DIAGNOSIS — K52.839 MICROSCOPIC COLITIS, UNSPECIFIED MICROSCOPIC COLITIS TYPE: ICD-10-CM

## 2025-06-19 DIAGNOSIS — K64.9 HEMORRHOIDS, UNSPECIFIED HEMORRHOID TYPE: ICD-10-CM

## 2025-06-19 DIAGNOSIS — D50.0 IRON DEFICIENCY ANEMIA DUE TO CHRONIC BLOOD LOSS: ICD-10-CM

## 2025-06-19 DIAGNOSIS — K52.839 MICROSCOPIC COLITIS, UNSPECIFIED MICROSCOPIC COLITIS TYPE: Primary | ICD-10-CM

## 2025-06-19 PROCEDURE — 99214 OFFICE O/P EST MOD 30 MIN: CPT | Performed by: PHYSICIAN ASSISTANT

## 2025-06-19 RX ORDER — COLESTIPOL HYDROCHLORIDE 1 G/1
1 TABLET ORAL 2 TIMES DAILY
Qty: 60 TABLET | Refills: 3 | Status: SHIPPED | OUTPATIENT
Start: 2025-06-19 | End: 2025-06-19

## 2025-06-19 RX ORDER — COLESTIPOL HYDROCHLORIDE 1 G/1
1 TABLET ORAL 2 TIMES DAILY
Qty: 180 TABLET | Refills: 1 | Status: SHIPPED | OUTPATIENT
Start: 2025-06-19

## 2025-06-19 NOTE — PROGRESS NOTES
Name: Ari Parsons      : 1950      MRN: [id number]  Encounter Provider: Meredith Yousif PA-C  Encounter Date: 2025   Encounter department: [facility] [place]  :  Assessment & Plan  Microscopic colitis, unspecified microscopic colitis type  Patient had great response to budesonide course but did have a return of symptoms upon discontinuation of budesonide.  At the time of patient's last office visit she noted that she would rather be on a different maintenance medication instead of remaining on budesonide so she was asked to start Questran twice daily.  Today, the patient said she was unable to tolerate Questran.  She said she took this only once a day for a few days in a row but had abdominal cramping and worsening of reflux?  She said she then tried Lomotil that was also sent in for her but this too created abdominal cramping and also created nightmares; She said she feels this is from the atropine that is.  At this time she is taking Imodium up to 6 times a day as but notes she is still getting loose to watery diarrhea anywhere from 6-8 times a day.  She said she would still prefer to hold off on budesonide even though she does admit that this is the only thing to alleviate her symptoms.  -Stop Imodium  -Start colestipol 1 g twice daily  -I discussed the importance of proper hydration including 1 to 2 cups of no sugar Gatorade or Pedialyte a day  - I did spend a great deal of time explaining to the patient that as she is on Prozac, which I do suspect is at least contributing to her ongoing microscopic colitis, if she continues to have symptoms even with being on the colestipol then we would again recommend a budesonide taper/prolonged course.  I did discuss with the patient that as the budesonide is mainly gut specific there is less of a chance of systemic side effects with this then there is with the prednisone so we would be completely fine giving her  another course of this.  Patient said she really would like to avoid budesonide so she is going to discuss switching Prozac to a non-SSRI with her PCP and will also try colestipol at this time  Orders:    colestipol (COLESTID) 1 g tablet; Take 1 tablet (1 g total) by mouth 2 (two) times a day    Iron deficiency anemia due to chronic blood loss  Patient with history of iron deficiency anemia and rectal bleeding suspected to be due to known hemorrhoids and von Willebrand's disease.  She thankfully has not had any further rectal bleeding.       Hemorrhoids, unspecified hemorrhoid type  Patient has a history of large external/internal hemorrhoids that have had significant bleeding.  She thankfully has not had any further rectal bleeding.  She has already been treated conservatively for this from our end so there is a referral placed for the patient to see the colon and rectal team if she does have return of bleeding.           History of Present Illness   HPI  Ari Parsons is a 75 y.o. female who presents for follow-up.the patient said she was unable to tolerate Questran.  She said she took this only once a day for a few days in a row but had abdominal cramping and worsening of reflux?  She said she then tried Lomotil that was also sent in for her but this too created abdominal cramping and also created nightmares; She said she feels this is from the atropine that is.  At this time she is taking Imodium up to 6 times a day as but notes she is still getting loose to watery diarrhea anywhere from 6-8 times a day.  She said she would still prefer to hold off on budesonide even though she does admit that this is the only thing to alleviate her symptoms. She denies bloody or black BM, n/v, abdominal pain, heartburn, weight changes, fevers/chills.   History obtained from: patient    Review of Systems   Constitutional:  Negative for chills, fever and unexpected weight change.   HENT:  Negative for trouble swallowing.     Gastrointestinal:  Positive for diarrhea. Negative for abdominal distention, abdominal pain, anal bleeding, blood in stool, constipation, nausea, rectal pain and vomiting.   All other systems reviewed and are negative.    Medical History Reviewed by provider this encounter:     .  Past Medical History   Past Medical History[1]  Past Surgical History[2]  Family History[3]   reports that she quit smoking about 14 years ago. Her smoking use included cigarettes, pipe, and cigars. She has never used smokeless tobacco. She reports current alcohol use of about 21.0 standard drinks of alcohol per week. She reports that she does not use drugs.  Current Outpatient Medications   Medication Instructions    albuterol (PROVENTIL HFA,VENTOLIN HFA) 90 mcg/act inhaler 2 puffs, Every 6 hours PRN    b complex vitamins capsule 1 capsule, Daily    budesonide (ENTOCORT EC) 3 MG capsule Take 3 capsules (9 mg total) by mouth daily for 28 days, THEN 2 capsules (6 mg total) daily for 14 days, THEN 1 capsule (3 mg total) daily for 14 days.    Cholecalciferol (VITAMIN D3) 1000 UNITS CAPS Daily    colestipol (COLESTID) 1 g, Oral, 2 times daily    diphenoxylate-atropine (LOMOTIL) 2.5-0.025 mg per tablet 1 tablet, Oral, 4 times daily PRN    estradiol (ESTRACE) 1 mg, Daily    famotidine (PEPCID) 40 mg, Oral, Daily    flecainide (TAMBOCOR) 50 mg tablet TAKE 1 AND 1/2 TABLETS(75 MG) BY MOUTH TWICE DAILY    FLUoxetine (PROZAC) 40 mg, Daily    hydrocortisone (ANUSOL-HC) 25 mg, Rectal, 2 times daily    loperamide (IMODIUM A-D) 2 mg, As needed    LORazepam (ATIVAN) 0.5 mg, Every 6 hours PRN    losartan (COZAAR) 100 mg, Oral, Daily    Magnesium 400 MG TABS Take by mouth    metoprolol succinate (TOPROL-XL) 50 mg, Oral, Daily    potassium chloride (MICRO-K) 10 MEQ CR capsule 20 mEq, Oral, 2 times daily    Qvar RediHaler 80 MCG/ACT inhaler 2 puffs, 2 times daily   Allergies[4]   Medications Ordered Prior to Encounter[5]   Social History[6]     Objective    There were no vitals taken for this visit.     Physical Exam  Constitutional:       Appearance: Normal appearance.   Abdominal:      General: Bowel sounds are normal. There is no distension.      Palpations: There is no mass.      Tenderness: There is no abdominal tenderness. There is no guarding or rebound.     Neurological:      Mental Status: She is alert.         Administrative Statements   I have spent a total time of 30 minutes in caring for this patient on the day of the visit/encounter including Diagnostic results, Prognosis, Risks and benefits of tx options, Instructions for management, Patient and family education, Importance of tx compliance, Risk factor reductions, Impressions, Counseling / Coordination of care, Documenting in the medical record, Reviewing/placing orders in the medical record (including tests, medications, and/or procedures), Obtaining or reviewing history  , and Communicating with other healthcare professionals .       [1]   Past Medical History:  Diagnosis Date    Anxiety     Asthma     Atrial fibrillation (HCC)     Bowel obstruction (HCC)     Hypertension     PAF (paroxysmal atrial fibrillation) (HCC)     Von Willebrand disease (HCC)    [2]   Past Surgical History:  Procedure Laterality Date    BUNIONECTOMY      COLONOSCOPY      HEMORROIDECTOMY      HYSTERECTOMY      POLYPECTOMY      UPPER GASTROINTESTINAL ENDOSCOPY     [3]   Family History  Problem Relation Name Age of Onset    Heart attack Father      Heart attack Sister     [4]   Allergies  Allergen Reactions    Bactrim [Sulfamethoxazole-Trimethoprim]     Wellbutrin [Bupropion]    [5]   Current Outpatient Medications on File Prior to Visit   Medication Sig Dispense Refill    albuterol (PROVENTIL HFA,VENTOLIN HFA) 90 mcg/act inhaler Inhale 2 puffs every 6 (six) hours as needed for wheezing      b complex vitamins capsule Take 1 capsule by mouth in the morning.      Cholecalciferol (VITAMIN D3) 1000 UNITS CAPS Take by mouth in the  morning.      estradiol (ESTRACE) 1 mg tablet Take 1 mg by mouth in the morning.      famotidine (PEPCID) 40 MG tablet Take 1 tablet (40 mg total) by mouth daily 90 tablet 3    flecainide (TAMBOCOR) 50 mg tablet TAKE 1 AND 1/2 TABLETS(75 MG) BY MOUTH TWICE DAILY 270 tablet 3    FLUoxetine (PROzac) 40 MG capsule Take 40 mg by mouth in the morning.      hydrocortisone (ANUSOL-HC) 25 mg suppository Insert 1 suppository (25 mg total) into the rectum 2 (two) times a day 12 suppository 0    loperamide (IMODIUM A-D) 2 MG tablet Take 2 mg by mouth as needed for diarrhea      LORazepam (ATIVAN) 0.5 mg tablet Take 0.5 mg by mouth every 6 (six) hours as needed for anxiety      losartan (COZAAR) 100 MG tablet TAKE 1 TABLET(100 MG) BY MOUTH DAILY 90 tablet 1    Magnesium 400 MG TABS Take by mouth      metoprolol succinate (TOPROL-XL) 50 mg 24 hr tablet TAKE 1 TABLET(50 MG) BY MOUTH DAILY 90 tablet 1    potassium chloride (MICRO-K) 10 MEQ CR capsule Take 2 capsules (20 mEq total) by mouth 2 (two) times a day 180 capsule 3    Qvar RediHaler 80 MCG/ACT inhaler 2 puffs in the morning and 2 puffs in the evening.      [DISCONTINUED] cholestyramine (QUESTRAN) 4 g packet MIX AND DRINK 1 PACKET(4 GRAMS) BY MOUTH TWICE DAILY 180 each 1    budesonide (ENTOCORT EC) 3 MG capsule Take 3 capsules (9 mg total) by mouth daily for 28 days, THEN 2 capsules (6 mg total) daily for 14 days, THEN 1 capsule (3 mg total) daily for 14 days. (Patient not taking: Reported on 2025) 130 capsule 2    diphenoxylate-atropine (LOMOTIL) 2.5-0.025 mg per tablet Take 1 tablet by mouth 4 (four) times a day as needed for diarrhea 30 tablet 0     No current facility-administered medications on file prior to visit.   [6]   Social History  Tobacco Use    Smoking status: Former     Current packs/day: 0.00     Types: Cigarettes, Pipe, Cigars     Quit date:      Years since quittin.4    Smokeless tobacco: Never    Tobacco comments:     1 pack/day for 5 years,  and about 1/2 pack for 30 years   Vaping Use    Vaping status: Never Used   Substance and Sexual Activity    Alcohol use: Yes     Alcohol/week: 21.0 standard drinks of alcohol     Types: 21 Glasses of wine per week    Drug use: No

## 2025-07-01 ENCOUNTER — TELEPHONE (OUTPATIENT)
Dept: CARDIOLOGY CLINIC | Facility: CLINIC | Age: 75
End: 2025-07-01

## 2025-07-08 ENCOUNTER — APPOINTMENT (OUTPATIENT)
Dept: LAB | Facility: CLINIC | Age: 75
End: 2025-07-08
Payer: COMMERCIAL

## 2025-07-08 DIAGNOSIS — D64.9 ANEMIA, UNSPECIFIED TYPE: ICD-10-CM

## 2025-07-08 DIAGNOSIS — E87.6 HYPOKALEMIA: ICD-10-CM

## 2025-07-08 LAB
ANION GAP SERPL CALCULATED.3IONS-SCNC: 7 MMOL/L (ref 4–13)
BUN SERPL-MCNC: 25 MG/DL (ref 5–25)
CALCIUM SERPL-MCNC: 8.8 MG/DL (ref 8.4–10.2)
CHLORIDE SERPL-SCNC: 106 MMOL/L (ref 96–108)
CO2 SERPL-SCNC: 22 MMOL/L (ref 21–32)
CREAT SERPL-MCNC: 0.95 MG/DL (ref 0.6–1.3)
ERYTHROCYTE [DISTWIDTH] IN BLOOD BY AUTOMATED COUNT: 13.3 % (ref 11.6–15.1)
GFR SERPL CREATININE-BSD FRML MDRD: 59 ML/MIN/1.73SQ M
GLUCOSE SERPL-MCNC: 88 MG/DL (ref 65–140)
HCT VFR BLD AUTO: 33.7 % (ref 34.8–46.1)
HGB BLD-MCNC: 11 G/DL (ref 11.5–15.4)
MCH RBC QN AUTO: 33.1 PG (ref 26.8–34.3)
MCHC RBC AUTO-ENTMCNC: 32.6 G/DL (ref 31.4–37.4)
MCV RBC AUTO: 102 FL (ref 82–98)
PLATELET # BLD AUTO: 243 THOUSANDS/UL (ref 149–390)
PMV BLD AUTO: 10.2 FL (ref 8.9–12.7)
POTASSIUM SERPL-SCNC: 4 MMOL/L (ref 3.5–5.3)
RBC # BLD AUTO: 3.32 MILLION/UL (ref 3.81–5.12)
SODIUM SERPL-SCNC: 135 MMOL/L (ref 135–147)
WBC # BLD AUTO: 9.26 THOUSAND/UL (ref 4.31–10.16)

## 2025-07-08 PROCEDURE — 36415 COLL VENOUS BLD VENIPUNCTURE: CPT

## 2025-07-08 PROCEDURE — 85027 COMPLETE CBC AUTOMATED: CPT

## 2025-07-08 PROCEDURE — 80048 BASIC METABOLIC PNL TOTAL CA: CPT

## 2025-08-18 ENCOUNTER — OFFICE VISIT (OUTPATIENT)
Dept: CARDIOLOGY CLINIC | Facility: CLINIC | Age: 75
End: 2025-08-18
Payer: COMMERCIAL

## 2025-08-18 VITALS
BODY MASS INDEX: 23.26 KG/M2 | WEIGHT: 139.6 LBS | DIASTOLIC BLOOD PRESSURE: 70 MMHG | OXYGEN SATURATION: 98 % | HEART RATE: 53 BPM | HEIGHT: 65 IN | SYSTOLIC BLOOD PRESSURE: 140 MMHG

## 2025-08-18 DIAGNOSIS — F41.9 ANXIETY DISORDER, UNSPECIFIED TYPE: ICD-10-CM

## 2025-08-18 DIAGNOSIS — I10 ESSENTIAL HYPERTENSION: ICD-10-CM

## 2025-08-18 DIAGNOSIS — I48.0 PAROXYSMAL ATRIAL FIBRILLATION (HCC): Primary | ICD-10-CM

## 2025-08-18 PROCEDURE — 99214 OFFICE O/P EST MOD 30 MIN: CPT | Performed by: INTERNAL MEDICINE
